# Patient Record
Sex: FEMALE | Race: WHITE | NOT HISPANIC OR LATINO | Employment: OTHER | URBAN - NONMETROPOLITAN AREA
[De-identification: names, ages, dates, MRNs, and addresses within clinical notes are randomized per-mention and may not be internally consistent; named-entity substitution may affect disease eponyms.]

---

## 2017-10-17 ENCOUNTER — OFFICE VISIT (OUTPATIENT)
Dept: OBSTETRICS AND GYNECOLOGY | Facility: CLINIC | Age: 42
End: 2017-10-17

## 2017-10-17 VITALS
DIASTOLIC BLOOD PRESSURE: 88 MMHG | BODY MASS INDEX: 25.48 KG/M2 | SYSTOLIC BLOOD PRESSURE: 156 MMHG | HEIGHT: 71 IN | WEIGHT: 182 LBS

## 2017-10-17 DIAGNOSIS — F41.9 ANXIETY: ICD-10-CM

## 2017-10-17 DIAGNOSIS — N64.4 BREAST TENDERNESS: ICD-10-CM

## 2017-10-17 DIAGNOSIS — Z01.419 WELL WOMAN EXAM WITH ROUTINE GYNECOLOGICAL EXAM: Primary | ICD-10-CM

## 2017-10-17 DIAGNOSIS — N39.0 URINARY TRACT INFECTION WITHOUT HEMATURIA, SITE UNSPECIFIED: ICD-10-CM

## 2017-10-17 PROCEDURE — 87512 GARDNER VAG DNA QUANT: CPT | Performed by: NURSE PRACTITIONER

## 2017-10-17 PROCEDURE — 87798 DETECT AGENT NOS DNA AMP: CPT | Performed by: NURSE PRACTITIONER

## 2017-10-17 PROCEDURE — 99213 OFFICE O/P EST LOW 20 MIN: CPT | Performed by: NURSE PRACTITIONER

## 2017-10-17 PROCEDURE — 87481 CANDIDA DNA AMP PROBE: CPT | Performed by: NURSE PRACTITIONER

## 2017-10-17 PROCEDURE — 99396 PREV VISIT EST AGE 40-64: CPT | Performed by: NURSE PRACTITIONER

## 2017-10-17 PROCEDURE — 87661 TRICHOMONAS VAGINALIS AMPLIF: CPT | Performed by: NURSE PRACTITIONER

## 2017-10-17 PROCEDURE — G0123 SCREEN CERV/VAG THIN LAYER: HCPCS | Performed by: NURSE PRACTITIONER

## 2017-10-17 RX ORDER — NITROFURANTOIN 25; 75 MG/1; MG/1
100 CAPSULE ORAL 2 TIMES DAILY
Qty: 20 CAPSULE | Refills: 5 | Status: SHIPPED | OUTPATIENT
Start: 2017-10-17 | End: 2018-11-30 | Stop reason: SDUPTHER

## 2017-10-17 RX ORDER — BUSPIRONE HYDROCHLORIDE 5 MG/1
5 TABLET ORAL 3 TIMES DAILY
Qty: 30 TABLET | Refills: 12 | Status: SHIPPED | OUTPATIENT
Start: 2017-10-17 | End: 2018-11-30

## 2017-10-17 NOTE — PROGRESS NOTES
"Subjective   Natalie Lawrence is a 42 y.o. female     HPI Comments: Here for yearly check up. Having breast tenderness.    Gynecologic Exam   The patient's pertinent negatives include no pelvic pain, vaginal bleeding or vaginal discharge. The patient is experiencing no pain. Pertinent negatives include no abdominal pain, anorexia, back pain, chills, constipation, diarrhea, discolored urine, dysuria, fever, flank pain, frequency, headaches, hematuria, joint pain, joint swelling, nausea, painful intercourse, rash, sore throat, urgency or vomiting. She is sexually active. She uses condoms for contraception. Her menstrual history has been regular.             /88  Ht 71\" (180.3 cm)  Wt 182 lb (82.6 kg)  LMP 10/01/2017 (Exact Date)  BMI 25.38 kg/m2      Outpatient Encounter Prescriptions as of 10/17/2017   Medication Sig Dispense Refill   • cholecalciferol (VITAMIN D3) 1000 UNITS tablet Take 1,000 Units by mouth Daily.     • Multiple Vitamins-Minerals (DAILY VITAMIN FORMULA+MINERALS PO) Take  by mouth.     • Bromfenac Sodium (PROLENSA) 0.07 % solution Apply  to eye.     • busPIRone (BUSPAR) 5 MG tablet Take 1 tablet by mouth 3 (Three) Times a Day. 30 tablet 12   • nitrofurantoin, macrocrystal-monohydrate, (MACROBID) 100 MG capsule Take 1 capsule by mouth 2 (Two) Times a Day. 20 capsule 5   • [DISCONTINUED] busPIRone (BUSPAR) 5 MG tablet Take 1 tablet by mouth 3 (Three) Times a Day. 30 tablet 12   • [DISCONTINUED] diclofenac (VOLTAREN) 50 MG EC tablet Take 1 tablet by mouth 2 (Two) Times a Day. 60 tablet 1   • [DISCONTINUED] fluconazole (DIFLUCAN) 150 MG tablet Take 1 tablet by mouth Daily. Take one today and repeat in 1 week 2 tablet 0   • [DISCONTINUED] nitrofurantoin, macrocrystal-monohydrate, (MACROBID) 100 MG capsule Take 1 capsule by mouth 2 (Two) Times a Day. 20 capsule 5     No facility-administered encounter medications on file as of 10/17/2017.            Surgical History  Past Surgical History: "   Procedure Laterality Date   •  SECTION     • LYMPH NODE BIOPSY           Family History  Family History   Problem Relation Age of Onset   • Heart disease Father    • Diabetes Father    • Hypertension Father    • Prostate cancer Father 60   • No Known Problems Mother    • Stroke Paternal Grandfather    • Breast cancer Neg Hx    • Ovarian cancer Neg Hx    • Uterine cancer Neg Hx    • Colon cancer Neg Hx    • Melanoma Neg Hx              The following portions of the patient's history were reviewed and updated as appropriate: allergies, current medications, past family history, past medical history, past social history, past surgical history and problem list.    Review of Systems   Constitutional: Negative for activity change, appetite change, chills, diaphoresis, fatigue, fever and unexpected weight change.   HENT: Negative for congestion, ear discharge, ear pain, facial swelling, hearing loss, mouth sores, nosebleeds, postnasal drip, rhinorrhea, sinus pressure, sneezing, sore throat, tinnitus, trouble swallowing and voice change.    Eyes: Negative for photophobia, pain, discharge, redness, itching and visual disturbance.   Respiratory: Negative for apnea, cough, choking, chest tightness and shortness of breath.    Cardiovascular: Negative for chest pain, palpitations and leg swelling.   Gastrointestinal: Negative for abdominal distention, abdominal pain, anal bleeding, anorexia, blood in stool, constipation, diarrhea, nausea, rectal pain and vomiting.   Endocrine: Negative for cold intolerance and heat intolerance.   Genitourinary: Negative for decreased urine volume, difficulty urinating, dyspareunia, dysuria, flank pain, frequency, genital sores, hematuria, menstrual problem, pelvic pain, urgency, vaginal bleeding, vaginal discharge and vaginal pain.   Musculoskeletal: Negative for arthralgias, back pain, joint pain, joint swelling and myalgias.   Skin: Negative for color change and rash.    Allergic/Immunologic: Negative for environmental allergies.   Neurological: Negative for dizziness, syncope, weakness, numbness and headaches.   Hematological: Negative for adenopathy.   Psychiatric/Behavioral: Negative for agitation, confusion and sleep disturbance. The patient is not nervous/anxious.              Objective   Physical Exam   Constitutional: She is oriented to person, place, and time. She appears well-developed and well-nourished. No distress.   HENT:   Head: Normocephalic.   Right Ear: External ear normal.   Left Ear: External ear normal.   Nose: Nose normal.   Mouth/Throat: Oropharynx is clear and moist.   Eyes: Conjunctivae are normal. Right eye exhibits no discharge. Left eye exhibits no discharge. No scleral icterus.   Neck: Normal range of motion. Neck supple. Carotid bruit is not present. No tracheal deviation present. No thyromegaly present.   Cardiovascular: Normal rate, regular rhythm, normal heart sounds and intact distal pulses.    No murmur heard.  Pulmonary/Chest: Effort normal and breath sounds normal. No respiratory distress. She has no wheezes. Right breast exhibits no inverted nipple, no mass, no nipple discharge, no skin change and no tenderness. Left breast exhibits no inverted nipple, no mass, no nipple discharge, no skin change and no tenderness. Breasts are symmetrical. There is no breast swelling.   Abdominal: Soft. She exhibits no distension and no mass. There is no tenderness. There is no guarding. No hernia. Hernia confirmed negative in the right inguinal area and confirmed negative in the left inguinal area.   Genitourinary: Rectum normal, vagina normal and uterus normal. Rectal exam shows no mass. No breast tenderness, discharge or bleeding. Pelvic exam was performed with patient supine. There is no rash, tenderness, lesion or injury on the right labia. There is no rash, tenderness, lesion or injury on the left labia. Uterus is not enlarged, not fixed and not tender.  Cervix exhibits no motion tenderness, no discharge and no friability. Right adnexum displays no mass, no tenderness and no fullness. Left adnexum displays no mass, no tenderness and no fullness. No erythema, tenderness or bleeding in the vagina. No foreign body in the vagina. No signs of injury around the vagina. No vaginal discharge found.   Genitourinary Comments:   BSU normal  Urethral meatus  Normal  Perineum  Normal   Musculoskeletal: Normal range of motion. She exhibits no edema or tenderness.   Lymphadenopathy:        Head (right side): No submental, no submandibular, no tonsillar, no preauricular, no posterior auricular and no occipital adenopathy present.        Head (left side): No submental, no submandibular, no tonsillar, no preauricular, no posterior auricular and no occipital adenopathy present.     She has no cervical adenopathy.        Right cervical: No superficial cervical, no deep cervical and no posterior cervical adenopathy present.       Left cervical: No superficial cervical, no deep cervical and no posterior cervical adenopathy present.     She has no axillary adenopathy.        Right: No inguinal adenopathy present.        Left: No inguinal adenopathy present.   Neurological: She is alert and oriented to person, place, and time. Coordination normal.   Skin: Skin is warm and dry. No bruising and no rash noted. She is not diaphoretic. No erythema.   Psychiatric: She has a normal mood and affect. Her behavior is normal. Judgment and thought content normal.   Nursing note and vitals reviewed.        Assessment/Plan   Natalie was seen today for gynecologic exam.    Diagnoses and all orders for this visit:    Well woman exam with routine gynecological exam  Normal GYN exam. Will RTO for lab work. Encouraged SBE. Discussed weight management and importance of maintaining a healthy weight. Discussed Vitamin D intake and the importance of adequate vitamin D. Mammogram will be done at [facility].  -      Liquid-based Pap Smear, Screening - ThinPrep Vial, Cervix  -     CBC & Differential  -     Comprehensive Metabolic Panel  -     Lipid Panel With LDL / HDL Ratio  -     TSH  -     T3, Uptake  -     Vitamin D 25 Hydroxy  -     T4, Free  -     Hemoglobin A1c  -     UA / M With / Rflx Culture([facility] ONLY) - Urine, Clean Catch  -     Mammo Screening Digital Tomosynthesis Bilateral With CAD; Future    Breast tenderness  Comments:  Having some breast tenderness. Encouraged to decrease Caffeine intake.    Anxiety  Comments:  RF given for Buspar. Doing well with it.  Orders:  -     busPIRone (BUSPAR) 5 MG tablet; Take 1 tablet by mouth 3 (Three) Times a Day.    Urinary tract infection without hematuria, site unspecified  Comments:  Has frequent post coital UTI. Given Macrobid to use as needed.  Orders:  -     nitrofurantoin, macrocrystal-monohydrate, (MACROBID) 100 MG capsule; Take 1 capsule by mouth 2 (Two) Times a Day.

## 2017-10-19 LAB
25(OH)D3+25(OH)D2 SERPL-MCNC: 58.9 NG/ML (ref 30–100)
ALBUMIN SERPL-MCNC: 4.4 G/DL (ref 3.5–5)
ALBUMIN/GLOB SERPL: 1.4 G/DL (ref 1.1–2.5)
ALP SERPL-CCNC: 68 U/L (ref 24–120)
ALT SERPL-CCNC: 35 U/L (ref 0–54)
APPEARANCE UR: CLEAR
AST SERPL-CCNC: 22 U/L (ref 7–45)
BACTERIA #/AREA URNS HPF: ABNORMAL /HPF
BACTERIA UR CULT: NO GROWTH
BACTERIA UR CULT: NORMAL
BASOPHILS # BLD AUTO: 0.03 10*3/MM3 (ref 0–0.2)
BASOPHILS NFR BLD AUTO: 0.4 % (ref 0–2)
BILIRUB SERPL-MCNC: 0.4 MG/DL (ref 0.1–1)
BILIRUB UR QL STRIP: NEGATIVE
BUN SERPL-MCNC: 12 MG/DL (ref 5–21)
BUN/CREAT SERPL: 18.2 (ref 7–25)
CALCIUM SERPL-MCNC: 9.1 MG/DL (ref 8.4–10.4)
CHLORIDE SERPL-SCNC: 103 MMOL/L (ref 98–110)
CHOLEST SERPL-MCNC: 145 MG/DL (ref 130–200)
CO2 SERPL-SCNC: 27 MMOL/L (ref 24–31)
COLOR UR: YELLOW
CREAT SERPL-MCNC: 0.66 MG/DL (ref 0.5–1.4)
EOSINOPHIL # BLD AUTO: 0.09 10*3/MM3 (ref 0–0.7)
EOSINOPHIL NFR BLD AUTO: 1.3 % (ref 0–4)
EPI CELLS #/AREA URNS HPF: ABNORMAL /HPF
ERYTHROCYTE [DISTWIDTH] IN BLOOD BY AUTOMATED COUNT: 12.6 % (ref 12–15)
GLOBULIN SER CALC-MCNC: 3.1 GM/DL
GLUCOSE SERPL-MCNC: 98 MG/DL (ref 70–100)
GLUCOSE UR QL: NEGATIVE
HBA1C MFR BLD: 5 %
HCT VFR BLD AUTO: 41.1 % (ref 37–47)
HDLC SERPL-MCNC: 50 MG/DL
HGB BLD-MCNC: 13.7 G/DL (ref 12–16)
HGB UR QL STRIP: NEGATIVE
IMM GRANULOCYTES # BLD: 0.02 10*3/MM3 (ref 0–0.03)
IMM GRANULOCYTES NFR BLD: 0.3 % (ref 0–5)
KETONES UR QL STRIP: NEGATIVE
LDLC SERPL CALC-MCNC: 85 MG/DL (ref 0–99)
LDLC/HDLC SERPL: 1.69 {RATIO}
LEUKOCYTE ESTERASE UR QL STRIP: NEGATIVE
LYMPHOCYTES # BLD AUTO: 1.68 10*3/MM3 (ref 0.72–4.86)
LYMPHOCYTES NFR BLD AUTO: 23.6 % (ref 15–45)
MCH RBC QN AUTO: 29.3 PG (ref 28–32)
MCHC RBC AUTO-ENTMCNC: 33.3 G/DL (ref 33–36)
MCV RBC AUTO: 87.8 FL (ref 82–98)
MICRO URNS: NORMAL
MICRO URNS: NORMAL
MONOCYTES # BLD AUTO: 0.62 10*3/MM3 (ref 0.19–1.3)
MONOCYTES NFR BLD AUTO: 8.7 % (ref 4–12)
MUCOUS THREADS URNS QL MICRO: PRESENT /HPF
NEUTROPHILS # BLD AUTO: 4.68 10*3/MM3 (ref 1.87–8.4)
NEUTROPHILS NFR BLD AUTO: 65.7 % (ref 39–78)
NITRITE UR QL STRIP: NEGATIVE
PH UR STRIP: 6.5 [PH] (ref 5–7.5)
PLATELET # BLD AUTO: 298 10*3/MM3 (ref 130–400)
POTASSIUM SERPL-SCNC: 4 MMOL/L (ref 3.5–5.3)
PROT SERPL-MCNC: 7.5 G/DL (ref 6.3–8.7)
PROT UR QL STRIP: NEGATIVE
RBC # BLD AUTO: 4.68 10*6/MM3 (ref 4.2–5.4)
RBC #/AREA URNS HPF: ABNORMAL /HPF
SODIUM SERPL-SCNC: 141 MMOL/L (ref 135–145)
SP GR UR: 1.01 (ref 1–1.03)
T3RU NFR SERPL: 27 % (ref 24–39)
T4 FREE SERPL-MCNC: 1.09 NG/DL (ref 0.78–2.19)
TRIGL SERPL-MCNC: 52 MG/DL (ref 0–149)
TSH SERPL DL<=0.005 MIU/L-ACNC: 0.84 MIU/ML (ref 0.47–4.68)
URINALYSIS REFLEX: NORMAL
UROBILINOGEN UR STRIP-MCNC: 0.2 MG/DL (ref 0.2–1)
VLDLC SERPL CALC-MCNC: 10.4 MG/DL
WBC # BLD AUTO: 7.12 10*3/MM3 (ref 4.8–10.8)
WBC #/AREA URNS HPF: ABNORMAL /HPF

## 2017-10-20 ENCOUNTER — DOCUMENTATION (OUTPATIENT)
Dept: OBSTETRICS AND GYNECOLOGY | Facility: CLINIC | Age: 42
End: 2017-10-20

## 2017-10-20 LAB
GEN CATEG CVX/VAG CYTO-IMP: NORMAL
LAB AP CASE REPORT: NORMAL
LAB AP GYN ADDITIONAL INFORMATION: NORMAL
Lab: NORMAL
PATH INTERP SPEC-IMP: NORMAL
STAT OF ADQ CVX/VAG CYTO-IMP: NORMAL

## 2017-10-27 ENCOUNTER — APPOINTMENT (OUTPATIENT)
Dept: MAMMOGRAPHY | Facility: HOSPITAL | Age: 42
End: 2017-10-27

## 2017-11-03 ENCOUNTER — HOSPITAL ENCOUNTER (OUTPATIENT)
Dept: MAMMOGRAPHY | Facility: HOSPITAL | Age: 42
Discharge: HOME | End: 2017-11-03
Admitting: NURSE PRACTITIONER

## 2017-11-03 DIAGNOSIS — Z01.419 WELL WOMAN EXAM WITH ROUTINE GYNECOLOGICAL EXAM: ICD-10-CM

## 2017-11-03 PROCEDURE — G0202 SCR MAMMO BI INCL CAD: HCPCS

## 2017-11-03 PROCEDURE — 77063 BREAST TOMOSYNTHESIS BI: CPT

## 2017-11-10 ENCOUNTER — OFFICE VISIT (OUTPATIENT)
Dept: FAMILY MEDICINE CLINIC | Facility: CLINIC | Age: 42
End: 2017-11-10

## 2017-11-10 VITALS
OXYGEN SATURATION: 98 % | RESPIRATION RATE: 16 BRPM | TEMPERATURE: 98.3 F | HEIGHT: 71 IN | BODY MASS INDEX: 25.7 KG/M2 | SYSTOLIC BLOOD PRESSURE: 132 MMHG | WEIGHT: 183.6 LBS | HEART RATE: 89 BPM | DIASTOLIC BLOOD PRESSURE: 74 MMHG

## 2017-11-10 DIAGNOSIS — Z23 NEED FOR INFLUENZA VACCINATION: ICD-10-CM

## 2017-11-10 DIAGNOSIS — B35.1 ONYCHOMYCOSIS: Primary | ICD-10-CM

## 2017-11-10 PROCEDURE — 99203 OFFICE O/P NEW LOW 30 MIN: CPT | Performed by: NURSE PRACTITIONER

## 2017-11-10 PROCEDURE — 90686 IIV4 VACC NO PRSV 0.5 ML IM: CPT | Performed by: NURSE PRACTITIONER

## 2017-11-10 PROCEDURE — 90471 IMMUNIZATION ADMIN: CPT | Performed by: NURSE PRACTITIONER

## 2017-11-10 NOTE — PROGRESS NOTES
Chief Complaint   Patient presents with   • Toe Pain     right         No Known Allergies    HPI:  Natalie Lawrence is a 42 y.o. female presents today with complaints of fungus on her right 3rd digit toe.  Has been present for a couple months and now it is hurting her and the nail is thick, discolored and painful.      Past Medical History:   Diagnosis Date   • Macular degeneration      Past Surgical History:   Procedure Laterality Date   •  SECTION     • LYMPH NODE BIOPSY       Social History     Social History   • Marital status:      Spouse name: N/A   • Number of children: N/A   • Years of education: N/A     Social History Main Topics   • Smoking status: Never Smoker   • Smokeless tobacco: Never Used   • Alcohol use No   • Drug use: No   • Sexual activity: Yes     Partners: Male     Birth control/ protection: Condom     Other Topics Concern   • None     Social History Narrative     Family History   Problem Relation Age of Onset   • Heart disease Father    • Diabetes Father    • Hypertension Father    • Prostate cancer Father 60   • No Known Problems Mother    • Stroke Paternal Grandfather    • Breast cancer Neg Hx    • Ovarian cancer Neg Hx    • Uterine cancer Neg Hx    • Colon cancer Neg Hx    • Melanoma Neg Hx        Current Outpatient Prescriptions on File Prior to Visit   Medication Sig Dispense Refill   • busPIRone (BUSPAR) 5 MG tablet Take 1 tablet by mouth 3 (Three) Times a Day. (Patient taking differently: Take 5 mg by mouth 3 (Three) Times a Day. As needed.) 30 tablet 12   • cholecalciferol (VITAMIN D3) 1000 UNITS tablet Take 1,000 Units by mouth Daily.     • Multiple Vitamins-Minerals (DAILY VITAMIN FORMULA+MINERALS PO) Take  by mouth.     • nitrofurantoin, macrocrystal-monohydrate, (MACROBID) 100 MG capsule Take 1 capsule by mouth 2 (Two) Times a Day. 20 capsule 5   • [DISCONTINUED] Bromfenac Sodium (PROLENSA) 0.07 % solution Apply  to eye.       No current facility-administered  "medications on file prior to visit.         REVIEW OF SYMPTOMS: (Positives bolded)  General:  weight loss, fever, chills, night sweats, fatigue, appetite loss  HEENT:  blurry vision, eye pain, eye discharge, dry eyes, decreased vision, sore throat tinnitus, bloody nose, hearin gloss, sinus pain/pressure, ear pain/pressure.   Respiratory: shortness of breath, cough, hemoptysis, wheezing, pleurisy,   Cardiovascular:  chest pain, PND, palpitation, edema, orthopnea, syncope, swelling of extremities  Gastro: Nausea, vomiting, diarrhea, hematemesis, abdominal pain, constipation  Genito: hematuria, dysuria, glycosuria, hesitancy, frequency, incontinence  Musckelo: Arthralgia, myalgia, muscle weakness, joint swelling, NSAID use  Skin: rash, pruritis, sores, nail changes, skin thickening, change in wart/mole, itching,  nail changes  Neuro:  Migraine, numbness, ataxia, tremor, vertigo, weakness, memory loss,  \"All other systems reviewed and negative, except as listed above.”      OBJECTIVE:  Constitutional:  Appearance-No acute distress, Consistent with stated age. Orientation- Oriented x 3, alert Posture-Not doubled over. Gait-Normal pace, normal arm movement. Posture- Normal Build and Nutrition-Well developed and well nourished.  General- Patient is pleasant and cooperative with the interview and exam.    Integumentary: General-No rashes, ulcers or lesions. No edema.  Palpation- Normal skin moisture/turgor. Skin is warm to touch, no increased warmth. Capillary refill is normal bilateral Upper and lower extremity.     Head/Neck: Head- normocephalic and atraumatic.  Neck- without visible/palpable lumps or pulsations.  Palpation- No bony tenderness about head/neck along frontal, occiptial, temporal, parietal, mastoid, jawline, zygoma, orbit or any other location.  NO temporal artery tenderness. No TMJ tenderness. Normal cervical ROM.   Neck Supple.  Thyroid-No thyromegaly, no nodules    CHEST/LUNG: Inspection- symmetric chest " wall no pectus deformity. Normal effort, no distress, no use of accessory muscles. Palpation- nontender sternum, ribline.  No abnormal pulsations. Auscultation- Breath sounds normal throughout all lung fields.  Normal tracheal sounds, Normal bronchial sounds overlying sternum, Bronchovessicular sounds normal between scapulae posteriorly, Normal vessicular breath sounds heard throughout periphery. Lungs are clear today. Adventitious sounds- No wheezes, rales, rhonchi.     CARDIOVASCULAR:  Carotid artery- normal, no bruits or abnormal pulsations. Jugular vein- no pulsations. Palpation/Percussion- Normal PMI, no palpable thrill  Auscultation- Regular rate and rhythm. No murmur noted in sitting, supine positions. Extremities- no digital clubbing, cyanosis, edema, increased warmth.    Peripheral Vascular: Upper extremity Left- Normal temperature with pink nailbeds and no ulcerations.  Upper extremity Right- Normal temperature with pink nailbeds and no ulcerations.  Lower extremity- Normal temperature with pink nailbeds and no ulcerations. DP pulses 2+ bilaterally.  Pedal hair intact.  Normal capillary refill. Edema- No edema.    Right FOOT:  The 3rd right digit toe has a very thick, discolored nail that is painful around the nail borders.  She has good pedal pulse and good ROM and sensation.      Lymphatic: Head/Neck- normal size and non tender to palpation. Axillary- Head and neck LN are normal size and non tender to palpation. Femoral and Inguinal- normal size and non tender to palpation.      Assessment/Plan:  Natalie was seen today for toe pain.    Diagnoses and all orders for this visit:    Onychomycosis  -     Ambulatory Referral to Podiatry    Need for influenza vaccination  -     Flu Vaccine Quad PF 3YR+ (FLUARIX/FLUZONE [id number])    Return in about 1 month (around 12/10/2017), or if symptoms worsen or fail to improve.    Morelia Field DNP, APRN-BC  11/10/2017          Morelia Filed DNP,  APRN-BC  11/10/2017

## 2017-11-10 NOTE — PATIENT INSTRUCTIONS
Fungal Nail Infection  Fungal nail infection is a common fungal infection of the toenails or fingernails. This condition affects toenails more often than fingernails. More than one nail may be infected. The condition can be passed from person to person (is contagious).  CAUSES  This condition is caused by a fungus. Several types of funguses can cause the infection. These funguses are common in moist and warm areas. If your hands or feet come into contact with the fungus, it may get into a crack in your fingernail or toenail and cause the infection.  RISK FACTORS  The following factors may make you more likely to develop this condition:  · Being male.  · Having diabetes.  · Being of older age.  · Living with someone who has the fungus.  · Walking barefoot in areas where the fungus thrives, such as showers or locker rooms.  · Having poor circulation.  · Wearing shoes and socks that cause your feet to sweat.  · Having athlete's foot.  · Having a nail injury or history of a recent nail surgery.  · Having psoriasis.  · Having a weak body defense system (immune system).  SYMPTOMS  Symptoms of this condition include:  · A pale spot on the nail.  · Thickening of the nail.  · A nail that becomes yellow or brown.  · A brittle or ragged nail edge.  · A crumbling nail.  · A nail that has lifted away from the nail bed.  DIAGNOSIS  This condition is diagnosed with a physical exam. Your health care provider may take a scraping or clipping from your nail to test for the fungus.  TREATMENT  Mild infections do not need treatment. If you have significant nail changes, treatment may include:  · Oral antifungal medicines. You may need to take the medicine for several weeks or several months, and you may not see the results for a long time. These medicines can cause side effects. Ask your health care provider what problems to watch for.  · Antifungal nail polish and nail cream. These may be used along with oral antifungal  medicines.  · Laser treatment of the nail.  · Surgery to remove the nail. This may be needed for the most severe infections.  Treatment takes a long time, and the infection may come back.  HOME CARE INSTRUCTIONS  Medicines  · Take or apply over-the-counter and prescription medicines only as told by your health care provider.  · Ask your health care provider about using over-the-counter mentholated ointment on your nails.  Lifestyle  · Do not share personal items, such as towels or nail clippers.  · Trim your nails often.  · Wash and dry your hands and feet every day.  · Wear absorbent socks, and change your socks frequently.  · Wear shoes that allow air to circulate, such as sandals or canvas tennis shoes. Throw out old shoes.  · Wear rubber gloves if you are working with your hands in wet areas.  · Do not walk barefoot in shower rooms or locker rooms.  · Do not use a nail salon that does not use clean instruments.  · Do not use artificial nails.  General Instructions  · Keep all follow-up visits as told by your health care provider. This is important.  · Use antifungal foot powder on your feet and in your shoes.  SEEK MEDICAL CARE IF:   Your infection is not getting better or it is getting worse after several months.     This information is not intended to replace advice given to you by your health care provider. Make sure you discuss any questions you have with your health care provider.     Document Released: 12/15/2001 Document Revised: 04/10/2017 Document Reviewed: 06/20/2016  [employer] Patient Education ©2017 [employer]

## 2017-11-13 ENCOUNTER — TELEPHONE (OUTPATIENT)
Dept: PODIATRY | Facility: CLINIC | Age: 42
End: 2017-11-13

## 2017-11-13 NOTE — TELEPHONE ENCOUNTER
Left message for patient advising that her appointment for Dr Burton had been scheduled for 12/6.  Requested a call back if this appointment would not work.  Information sent via mail as well

## 2017-11-15 ENCOUNTER — TELEPHONE (OUTPATIENT)
Dept: FAMILY MEDICINE CLINIC | Facility: CLINIC | Age: 42
End: 2017-11-15

## 2017-11-15 DIAGNOSIS — L60.0 INGROWN TOENAIL: Primary | ICD-10-CM

## 2017-11-15 RX ORDER — CEPHALEXIN 500 MG/1
500 CAPSULE ORAL 3 TIMES DAILY
Qty: 21 CAPSULE | Refills: 0 | Status: SHIPPED | OUTPATIENT
Start: 2017-11-15 | End: 2017-11-22

## 2017-11-15 NOTE — TELEPHONE ENCOUNTER
PATIENT WAS SEEN 11/10/17 FOR INFECTED TOENAIL SHE WAS REFERRED TO DR ESTEBAN HE CANT SEE HER UNTIL 12/06/17 THEY SUGGESTED THAT SHE CALL HER TO HAVE AN ANTIBIOTIC SENT IN FOR THE INFECTION THAT SHE HAS CURRENTLY IN THE TOE. PATIENT STATES THAT SHE IS NOT ALLERGIC TO ANY MEDICATIONS.PLEASE REVIEW AND ADVISE

## 2017-12-06 ENCOUNTER — OFFICE VISIT (OUTPATIENT)
Dept: PODIATRY | Facility: CLINIC | Age: 42
End: 2017-12-06

## 2017-12-06 VITALS
HEIGHT: 71 IN | DIASTOLIC BLOOD PRESSURE: 84 MMHG | SYSTOLIC BLOOD PRESSURE: 132 MMHG | HEART RATE: 110 BPM | WEIGHT: 184 LBS | OXYGEN SATURATION: 99 % | BODY MASS INDEX: 25.76 KG/M2

## 2017-12-06 DIAGNOSIS — L60.1 ONYCHOLYSIS: ICD-10-CM

## 2017-12-06 DIAGNOSIS — B35.1 ONYCHOMYCOSIS: Primary | ICD-10-CM

## 2017-12-06 PROCEDURE — 99203 OFFICE O/P NEW LOW 30 MIN: CPT | Performed by: PODIATRIST

## 2017-12-06 PROCEDURE — 11720 DEBRIDE NAIL 1-5: CPT | Performed by: PODIATRIST

## 2017-12-06 NOTE — PROGRESS NOTES
[facility] - PODIATRY    Today's Date: 17    Patient Name: Natalie Lawrence  MRN: [id number]  CSN: [id number]  PCP: No Known Provider  Referring Provider: Morelia Field DNP*    SUBJECTIVE     Chief Complaint   Patient presents with   • Right Foot - Nail Problem     Patient states she is here for issues with nail on third toe since October. She had previous treatment of oral antibiotic, antibiotic ointment and epsom salt soaks. She denies any pain today.      HPI: Natalie Lawrence, a 42 y.o.female, comes to clinic as a(n) new patient complaining of fungal loose right 3rd toenail. Patient has h/o macular degeneration, onychomycosis. States that for many years she has had fungal toenails. Mostly the nails have never bothered her. Relates that 2 months ago her right 3rd toenail became red, swollen and occasionally would drain. She has tried oral abx, topical abx cream and epsom salt foot soaks. For the past 2-3 weeks the nail has not been problematic but she wanted to have it checked out. Denies pain today. Denies any constitutional symptoms. No other pedal complaints at this time.    Past Medical History:   Diagnosis Date   • Macular degeneration      Past Surgical History:   Procedure Laterality Date   •  SECTION     • DILATATION AND CURETTAGE     • LYMPH NODE BIOPSY       Family History   Problem Relation Age of Onset   • Heart disease Father    • Diabetes Father    • Hypertension Father    • Prostate cancer Father 60   • No Known Problems Mother    • Stroke Paternal Grandfather    • Breast cancer Neg Hx    • Ovarian cancer Neg Hx    • Uterine cancer Neg Hx    • Colon cancer Neg Hx    • Melanoma Neg Hx      Social History     Social History   • Marital status:      Spouse name: N/A   • Number of children: N/A   • Years of education: N/A     Occupational History   • Not on file.     Social History Main Topics   • Smoking status: Never Smoker   • Smokeless tobacco: Never Used   •  Alcohol use No   • Drug use: No   • Sexual activity: Yes     Partners: Male     Birth control/ protection: Condom     Other Topics Concern   • Not on file     Social History Narrative     No Known Allergies  Current Outpatient Prescriptions   Medication Sig Dispense Refill   • busPIRone (BUSPAR) 5 MG tablet Take 1 tablet by mouth 3 (Three) Times a Day. (Patient taking differently: Take 5 mg by mouth 3 (Three) Times a Day. As needed.) 30 tablet 12   • cholecalciferol (VITAMIN D3) 1000 UNITS tablet Take 5,000 Units by mouth Daily.     • Multiple Vitamins-Minerals (DAILY VITAMIN FORMULA+MINERALS PO) Take  by mouth.     • NON FORMULARY Daily. OTC eye vitamin     • ciclopirox (PENLAC) 8 % solution Apply  topically Every Night for 336 days. Apply as directed to affected toenails. 6 mL 5   • nitrofurantoin, macrocrystal-monohydrate, (MACROBID) 100 MG capsule Take 1 capsule by mouth 2 (Two) Times a Day. 20 capsule 5     No current facility-administered medications for this visit.      Review of Systems   Constitutional: Negative for chills and fever.   HENT: Negative for congestion.    Respiratory: Negative for shortness of breath.    Cardiovascular: Negative for chest pain and leg swelling.   Gastrointestinal: Negative for constipation, diarrhea, nausea and vomiting.   Skin: Negative for wound.        Fungal toenail   Neurological: Negative for numbness.       OBJECTIVE     Vitals:    12/06/17 1311   BP: 132/84   Pulse: 110   SpO2: 99%       PHYSICAL EXAM  GEN:   A&Ox3, NAD. Pt presents to clinic ambulating without assistance and wearing Casual Shoes.      NEURO:   Protective sensation intact to 10/10 sites Right foot, 10/10 site Left foot using [place] monofilament  Light touch sensation present  No Tinel's or Villeux sign.    VASC:  Skin temperature Warm to Warm proximal to distal robe  DP pulses 2/4 Right, 2/4 Left  PT pulses 2/4 Right, 2/4 Left  CFT <3 sec robe  Pedal hair growth present  no edema noted  robe  Varicosities absent robe    MUSC/SKEL:  Muscle Strength Right foot Dorsiflexors 5/5, Plantarflexors 5/5, Evertors 5/5, Invertors 5/5  Muscle Strength Left foot Dorsiflexors 5/5, Plantarflexors 5/5, Evertors 5/5, Invertors 5/5  ROM of the 1st MTP is full without pain or crepitus  ROM of the MTJ is full without pain or crepitus    ROM of the STJ is full without pain or crepitus    ROM of the ankle joint is full without pain or crepitus    No POP during exam  Rectus foot type   Gait pattern: Normal  No gross pedal musculoskeletal deformities noted.     DERM:  Right 3rd toenail is yellowed, thickened with subungual debris. Nail plate is only attached at proximal aspect. No open wound or sore post debridement.  Webspaces are Clean, Dry, and Intact  Skin is normal in turgor and texture with no open wounds or sores appreciated.      RADIOLOGY/NUCLEAR:  No results found.    LABORATORY/CULTURE RESULTS:      PATHOLOGY RESULTS:       ASSESSMENT/PLAN     Natalie was seen today for nail problem.    Diagnoses and all orders for this visit:    Onychomycosis    Onycholysis    Other orders  -     ciclopirox (PENLAC) 8 % solution; Apply  topically Every Night for 336 days. Apply as directed to affected toenails.    Comprehensive lower extremity examination and evaluation was performed.  Discussed findings and treatment plan including risks, benefits, and treatment options with patient in detail. Patient agreed with treatment plan.  After verbal consent obtained, nail(s) x1 debrided of length and thickness with nail nipper without incidence   Given additional options of PO antifungal or TNA, deferred at this time. Opts for topical antifungal.  An After Visit Summary was printed and given to the patient at discharge, including (if requested) any available informative/educational handouts regarding diagnosis, treatment, or medications. All questions were answered to patient/family satisfaction. Should symptoms fail to improve or  worsen they agree to call or return to clinic or to go to the Emergency Department. Discussed the importance of following up with any needed screening tests/labs/specialist appointments and any requested follow-up recommended by me today. Importance of maintaining follow-up discussed and patient accepts that missed appointments can delay diagnosis and potentially lead to worsening of conditions.  Return if symptoms worsen or fail to improve., or sooner if acute issues arise.        This document has been electronically signed by Palomo Burton DPM on December 6, 2017 1:28 PM

## 2017-12-06 NOTE — PATIENT INSTRUCTIONS
Fungal Nail Infection  Fungal nail infection is a common fungal infection of the toenails or fingernails. This condition affects toenails more often than fingernails. More than one nail may be infected. The condition can be passed from person to person (is contagious).  CAUSES  This condition is caused by a fungus. Several types of funguses can cause the infection. These funguses are common in moist and warm areas. If your hands or feet come into contact with the fungus, it may get into a crack in your fingernail or toenail and cause the infection.  RISK FACTORS  The following factors may make you more likely to develop this condition:  · Being male.  · Having diabetes.  · Being of older age.  · Living with someone who has the fungus.  · Walking barefoot in areas where the fungus thrives, such as showers or locker rooms.  · Having poor circulation.  · Wearing shoes and socks that cause your feet to sweat.  · Having athlete's foot.  · Having a nail injury or history of a recent nail surgery.  · Having psoriasis.  · Having a weak body defense system (immune system).  SYMPTOMS  Symptoms of this condition include:  · A pale spot on the nail.  · Thickening of the nail.  · A nail that becomes yellow or brown.  · A brittle or ragged nail edge.  · A crumbling nail.  · A nail that has lifted away from the nail bed.  DIAGNOSIS  This condition is diagnosed with a physical exam. Your health care provider may take a scraping or clipping from your nail to test for the fungus.  TREATMENT  Mild infections do not need treatment. If you have significant nail changes, treatment may include:  · Oral antifungal medicines. You may need to take the medicine for several weeks or several months, and you may not see the results for a long time. These medicines can cause side effects. Ask your health care provider what problems to watch for.  · Antifungal nail polish and nail cream. These may be used along with oral antifungal  medicines.  · Laser treatment of the nail.  · Surgery to remove the nail. This may be needed for the most severe infections.  Treatment takes a long time, and the infection may come back.  HOME CARE INSTRUCTIONS  Medicines  · Take or apply over-the-counter and prescription medicines only as told by your health care provider.  · Ask your health care provider about using over-the-counter mentholated ointment on your nails.  Lifestyle  · Do not share personal items, such as towels or nail clippers.  · Trim your nails often.  · Wash and dry your hands and feet every day.  · Wear absorbent socks, and change your socks frequently.  · Wear shoes that allow air to circulate, such as sandals or canvas tennis shoes. Throw out old shoes.  · Wear rubber gloves if you are working with your hands in wet areas.  · Do not walk barefoot in shower rooms or locker rooms.  · Do not use a nail salon that does not use clean instruments.  · Do not use artificial nails.  General Instructions  · Keep all follow-up visits as told by your health care provider. This is important.  · Use antifungal foot powder on your feet and in your shoes.  SEEK MEDICAL CARE IF:   Your infection is not getting better or it is getting worse after several months.     This information is not intended to replace advice given to you by your health care provider. Make sure you discuss any questions you have with your health care provider.     Document Released: 12/15/2001 Document Revised: 04/10/2017 Document Reviewed: 06/20/2016  [employer] Patient Education ©2017 [employer]  Ciclopirox nail solution  What is this medicine?  CICLOPIROX (sye kloe PEER ox) NAIL SOLUTION is an antifungal medicine. It used to treat fungal infections of the nails.  This medicine may be used for other purposes; ask your health care provider or pharmacist if you have questions.  COMMON BRAND NAME(S): SETH, Janes  What should I tell my health care provider before I take this  medicine?  They need to know if you have any of these conditions:  -diabetes mellitus  -history of seizures  -HIV infection  -immune system problems or organ transplant  -large areas of burned or damaged skin  -peripheral vascular disease or poor circulation  -taking corticosteroid medication (including steroid inhalers, cream, or lotion)  -an unusual or allergic reaction to ciclopirox, isopropyl alcohol, other medicines, foods, dyes, or preservatives  -pregnant or trying to get pregnant  -breast-feeding  How should I use this medicine?  This medicine is for external use only. Follow the directions that come with this medicine exactly. Wash and dry your hands before use. Avoid contact with the eyes, mouth or nose. If you do get this medicine in your eyes, rinse out with plenty of cool tap water. Contact your doctor or health care professional if eye irritation occurs. Use at regular intervals. Do not use your medicine more often than directed. Finish the full course prescribed by your doctor or health care professional even if you think you are better. Do not stop using except on your doctor's advice.  Talk to your pediatrician regarding the use of this medicine in children. While this medicine may be prescribed for children as young as 12 years for selected conditions, precautions do apply.  Overdosage: If you think you have taken too much of this medicine contact a poison control center or emergency room at once.  NOTE: This medicine is only for you. Do not share this medicine with others.  What if I miss a dose?  If you miss a dose, use it as soon as you can. If it is almost time for your next dose, use only that dose. Do not use double or extra doses.  What may interact with this medicine?  Interactions are not expected. Do not use any other skin products without telling your doctor or health care professional.  This list may not describe all possible interactions. Give your health care provider a list of all  the medicines, herbs, non-prescription drugs, or dietary supplements you use. Also tell them if you smoke, drink alcohol, or use illegal drugs. Some items may interact with your medicine.  What should I watch for while using this medicine?  Tell your doctor or health care professional if your symptoms get worse. Four to six months of treatment may be needed for the nail(s) to improve. Some people may not achieve a complete cure or clearing of the nails by this time.  Tell your doctor or health care professional if you develop sores or blisters that do not heal properly. If your nail infection returns after stopping using this product, contact your doctor or health care professional.  What side effects may I notice from receiving this medicine?  Side effects that you should report to your doctor or health care professional as soon as possible:  -allergic reactions like skin rash, itching or hives, swelling of the face, lips, or tongue  -severe irritation, redness, burning, blistering, peeling, swelling, oozing  Side effects that usually do not require medical attention (report to your doctor or health care professional if they continue or are bothersome):  -mild reddening of the skin  -nail discoloration  -temporary burning or mild stinging at the site of application  This list may not describe all possible side effects. Call your doctor for medical advice about side effects. You may report side effects to FDA at 0-712-FDA-6956.  Where should I keep my medicine?  Keep out of the reach of children.  Store at room temperature between 15 and 30 degrees C (59 and 86 degrees F). Do not freeze. Protect from light by storing the bottle in the carton after every use. This medicine is flammable. Keep away from heat and flame. Throw away any unused medicine after the expiration date.  NOTE: This sheet is a summary. It may not cover all possible information. If you have questions about this medicine, talk to your doctor,  pharmacist, or health care provider.     © 2017, [employer]. (2009-03-23 16:49:20)

## 2017-12-15 ENCOUNTER — OFFICE VISIT (OUTPATIENT)
Dept: FAMILY MEDICINE CLINIC | Facility: CLINIC | Age: 42
End: 2017-12-15

## 2017-12-15 ENCOUNTER — HOSPITAL ENCOUNTER (OUTPATIENT)
Dept: GENERAL RADIOLOGY | Facility: HOSPITAL | Age: 42
Discharge: HOME | End: 2017-12-15
Admitting: NURSE PRACTITIONER

## 2017-12-15 VITALS
RESPIRATION RATE: 18 BRPM | HEIGHT: 71 IN | TEMPERATURE: 100.3 F | SYSTOLIC BLOOD PRESSURE: 145 MMHG | WEIGHT: 182.2 LBS | BODY MASS INDEX: 25.51 KG/M2 | OXYGEN SATURATION: 97 % | HEART RATE: 101 BPM | DIASTOLIC BLOOD PRESSURE: 93 MMHG

## 2017-12-15 DIAGNOSIS — R50.9 FEVER, UNSPECIFIED FEVER CAUSE: Primary | ICD-10-CM

## 2017-12-15 DIAGNOSIS — J40 BRONCHITIS: ICD-10-CM

## 2017-12-15 DIAGNOSIS — R06.89 ABNORMAL BREATH SOUNDS: ICD-10-CM

## 2017-12-15 LAB
B-HCG UR QL: NEGATIVE
EXPIRATION DATE: NORMAL
FLUAV AG NPH QL: NORMAL
FLUBV AG NPH QL: NORMAL
INTERNAL CONTROL: NORMAL
INTERNAL NEGATIVE CONTROL: NEGATIVE
INTERNAL POSITIVE CONTROL: POSITIVE
Lab: NORMAL
Lab: NORMAL

## 2017-12-15 PROCEDURE — 99214 OFFICE O/P EST MOD 30 MIN: CPT | Performed by: NURSE PRACTITIONER

## 2017-12-15 PROCEDURE — 81025 URINE PREGNANCY TEST: CPT | Performed by: NURSE PRACTITIONER

## 2017-12-15 PROCEDURE — 71020 HC CHEST PA AND LATERAL: CPT

## 2017-12-15 PROCEDURE — 87804 INFLUENZA ASSAY W/OPTIC: CPT | Performed by: NURSE PRACTITIONER

## 2017-12-15 PROCEDURE — 96372 THER/PROPH/DIAG INJ SC/IM: CPT | Performed by: NURSE PRACTITIONER

## 2017-12-15 RX ORDER — FLUTICASONE PROPIONATE 50 MCG
2 SPRAY, SUSPENSION (ML) NASAL DAILY
Qty: 1 BOTTLE | Refills: 0 | Status: SHIPPED | OUTPATIENT
Start: 2017-12-15 | End: 2018-11-30

## 2017-12-15 RX ORDER — DEXAMETHASONE SODIUM PHOSPHATE 10 MG/ML
10 INJECTION INTRAMUSCULAR; INTRAVENOUS ONCE
Status: COMPLETED | OUTPATIENT
Start: 2017-12-15 | End: 2017-12-15

## 2017-12-15 RX ORDER — CETIRIZINE HYDROCHLORIDE 10 MG/1
10 TABLET ORAL DAILY
Qty: 30 TABLET | Refills: 0 | Status: SHIPPED | OUTPATIENT
Start: 2017-12-15 | End: 2018-11-30

## 2017-12-15 RX ORDER — AZITHROMYCIN 250 MG/1
TABLET, FILM COATED ORAL
Qty: 6 TABLET | Refills: 0 | Status: SHIPPED | OUTPATIENT
Start: 2017-12-15 | End: 2018-11-30

## 2017-12-15 RX ADMIN — DEXAMETHASONE SODIUM PHOSPHATE 10 MG: 10 INJECTION INTRAMUSCULAR; INTRAVENOUS at 15:23

## 2017-12-15 NOTE — PROGRESS NOTES
Chief Complaint   Patient presents with   • URI     symptoms started monday with congestion and feeling very fatigue, deep loose cough. patient has been taking ashley selzter daytime , amd nyquil gel caps without any relief.       HISTORY/ HPI:  Natalie Lawrence is a 42 y.o.  female who presents  today for an acute complaint of malaise, fever, rhinorrhea, cough, and congestion, onset approximately 3 days ago; has used OTC ashley seltzer and Daytime only with minimal relief; no aggravating factors; symptoms are constant and progressive since onset; rates severity of symptoms 5 /10; known exposure to son and  with similar illness; no recent illnesses or additional concerns.    Natalie Lawrence  has a past medical history of Anxiety and Macular degeneration.    No Known Allergies    Current Outpatient Prescriptions:   •  cholecalciferol (VITAMIN D3) 1000 UNITS tablet, Take 5,000 Units by mouth Daily., Disp: , Rfl:   •  Multiple Vitamins-Minerals (DAILY VITAMIN FORMULA+MINERALS PO), Take  by mouth., Disp: , Rfl:   •  busPIRone (BUSPAR) 5 MG tablet, Take 1 tablet by mouth 3 (Three) Times a Day. (Patient taking differently: Take 5 mg by mouth 3 (Three) Times a Day. As needed.), Disp: 30 tablet, Rfl: 12  •  nitrofurantoin, macrocrystal-monohydrate, (MACROBID) 100 MG capsule, Take 1 capsule by mouth 2 (Two) Times a Day., Disp: 20 capsule, Rfl: 5  •  NON FORMULARY, Daily. OTC eye vitamin, Disp: , Rfl:   Past Medical History:   Diagnosis Date   • Anxiety    • Macular degeneration      Past Surgical History:   Procedure Laterality Date   •  SECTION     • DILATATION AND CURETTAGE     • LYMPH NODE BIOPSY       Social History     Social History   • Marital status:      Spouse name: N/A   • Number of children: N/A   • Years of education: N/A     Social History Main Topics   • Smoking status: Never Smoker   • Smokeless tobacco: Never Used   • Alcohol use No   • Drug use: No   • Sexual activity: Yes      "Partners: Male     Birth control/ protection: Condom     Other Topics Concern   • None     Social History Narrative   • None     Family History   Problem Relation Age of Onset   • Heart disease Father    • Diabetes Father    • Hypertension Father    • Prostate cancer Father 60   • No Known Problems Mother    • Stroke Paternal Grandfather    • Breast cancer Neg Hx    • Ovarian cancer Neg Hx    • Uterine cancer Neg Hx    • Colon cancer Neg Hx    • Melanoma Neg Hx      Family history, surgical history, past medical history, Allergies and med's reviewed with patient today and updated in [facility] EMR.     ROS:  Review of Systems   Constitutional: Positive for chills, fatigue and fever. Negative for diaphoresis.   HENT: Positive for congestion, rhinorrhea (purulent in the am and clear throughout the day) and sinus pressure (maxillary). Negative for ear discharge, ear pain, sinus pain, sneezing, sore throat, trouble swallowing and voice change.    Eyes: Negative for pain, discharge, redness and itching.   Respiratory: Positive for cough (non-productive, intermittent). Negative for chest tightness, shortness of breath and wheezing.    Cardiovascular: Negative for chest pain, palpitations and leg swelling.   Gastrointestinal: Negative for abdominal distention, diarrhea, nausea and vomiting.   Endocrine: Negative.    Genitourinary: Negative.    Musculoskeletal: Negative for myalgias.   Skin: Negative for rash.   Allergic/Immunologic: Positive for environmental allergies.   Neurological: Positive for headaches.   Hematological: Negative.    Psychiatric/Behavioral: Negative.    All other systems reviewed and are negative.    OBJECTIVE:  Vitals:    12/15/17 1337 12/15/17 1345   BP: 155/91 145/93   BP Location: Right arm Left arm   Patient Position: Sitting    Cuff Size: Adult    Pulse: 101    Resp: 18    Temp: 100.3 °F (37.9 °C)    TempSrc: Temporal Artery     SpO2: 97%    Weight: 82.6 kg (182 lb 3.2 oz)    Height: 180.3 cm (71\")  "     Physical Exam   Constitutional: She is oriented to person, place, and time. She appears well-developed and well-nourished. She is cooperative.  Non-toxic appearance. She does not have a sickly appearance. She does not appear ill. No distress.   HENT:   Head: Normocephalic.   Right Ear: Tympanic membrane normal. Tympanic membrane is not injected, not perforated, not erythematous, not retracted and not bulging.   Left Ear: Tympanic membrane normal. Tympanic membrane is not injected, not perforated, not erythematous, not retracted and not bulging.   Nose: Mucosal edema and rhinorrhea present. Right sinus exhibits no maxillary sinus tenderness and no frontal sinus tenderness. Left sinus exhibits no maxillary sinus tenderness and no frontal sinus tenderness.   Mouth/Throat: Uvula is midline, oropharynx is clear and moist and mucous membranes are normal. No posterior oropharyngeal edema or posterior oropharyngeal erythema. Tonsils are 0 on the right. Tonsils are 0 on the left. No tonsillar exudate.   Eyes: Conjunctivae are normal. Pupils are equal, round, and reactive to light. Right conjunctiva is not injected. Left conjunctiva is not injected.   Neck: Trachea normal and full passive range of motion without pain. Neck supple. No rigidity. No thyroid mass and no thyromegaly present.   Cardiovascular: Normal rate, regular rhythm and normal heart sounds.  Exam reveals no gallop and no friction rub.    No murmur heard.  Pulmonary/Chest: Effort normal. No respiratory distress. She has no decreased breath sounds. She has no wheezes. She has no rhonchi. She has rales in the right middle field and the right lower field. She exhibits no tenderness.   Lymphadenopathy:        Head (right side): Tonsillar adenopathy present.        Head (left side): Tonsillar adenopathy present.   Neurological: She is alert and oriented to person, place, and time.   Skin: Skin is warm, dry and intact. No rash noted.   Psychiatric: She has a  normal mood and affect. Her speech is normal and behavior is normal. Thought content normal.   Nursing note and vitals reviewed.    POCT Influenza A/B   Order: [id number]   Status:  Final result   Visible to patient:  No (Not Released) Dx:  Fever, unspecified fever cause      Newer results are available. Click to view them now.            Ref Range & Units 2d ago     Rapid Influenza A Ag  neg   Rapid Influenza B Ag  neg   Internal Control Passed Passed   Lot Number  [id number]   Expiration Date  98225   Resulting Agency  [facility]           POC Pregnancy, Urine   Order: [id number]   Status:  Final result   Visible to patient:  No (Not Released) Dx:  Abnormal breath sounds      Ref Range & Units 2d ago     HCG, Urine, QL Negative Negative   Lot Number  [id number]   Internal Positive Control  Positive   Internal Negative Control  Negative   Resulting [facility]           XR CHEST PA AND LATERAL- 12/15/2017 2:27 PM CST      HISTORY: abnormal breath sounds right mid and lower lung fields;  R06.89-Other abnormalities of breathing        COMPARISON: None.      FINDINGS:   Upright frontal and lateral radiographs of the chest were obtained.      The lungs are clear. The cardiomediastinal silhouette and pulmonary  vascularity are within normal limits. The osseous structures and  surrounding soft tissues demonstrate no acute abnormality.      IMPRESSION:  1. No radiographic evidence of acute cardiopulmonary process.          This report was finalized on 12/15/2017 14:38 by Dr. Timo Gant MD.    ASSESSMENT/ PLAN:  Natalie was seen today for uri.    Diagnoses and all orders for this visit:    Fever, unspecified fever cause  -     POCT Influenza A/B  -     azithromycin (ZITHROMAX Z-RUPA) 250 MG tablet; Take 2 tablets the first day, then 1 tablet daily for 4 days.    Abnormal breath sounds  -     POC Pregnancy, Urine  -     XR Chest PA & Lateral  -     azithromycin (ZITHROMAX Z-RUPA)  250 MG tablet; Take 2 tablets the first day, then 1 tablet daily for 4 days.    Bronchitis  -     azithromycin (ZITHROMAX Z-RUPA) 250 MG tablet; Take 2 tablets the first day, then 1 tablet daily for 4 days.  -     cetirizine (zyrTEC) 10 MG tablet; Take 1 tablet by mouth Daily.  -     fluticasone (FLONASE) 50 MCG/ACT nasal spray; 2 sprays into each nostril Daily.  -     dexamethasone (DECADRON) injection 10 mg; Inject 1 mL into the shoulder, thigh, or buttocks 1 (One) Time.    Acute bronchitis is a self-limited inflammation of the bronchi with clinical symptoms of cough, low grade fever, + sputum production.  This cannot be distinguished clinically from URI in first 4-5 days of illness.  Dx of bronchitis should be considered if cough >5 days.  DDX discussed today include viral processes such as Rhino (warmer months), corona, adeno, parainfluenza (cooler months), acute bronchitis unspecified, Allergic rhinitis with cough, post nasal drip syndrome, COPD, and less likely GERD, asthma and pneumonia (less likely based on history/examination absence of higher fever, systemic findings and peripheral pulmonary process).  Smoking status discussed if applicable. Treatment options typically directed towards symptom management. We discussed that studies do not typically support use of abx for treatment of bronchitis.  The patient voiced understanding. Cough can last up to 21 days with ¾ of patients having resolution of symptoms by day 14. Dextromethorphan has typically not helped with cough in children.  Studies have shown benefit to bronchodilators when wheezing is present.  Discussed limited benefit to any medications for bronchitis.  Dark honey can be a benefit.   If limited improvement or worsening over next week the patient/family can contact clinic to consider starting abx azithromycin vs doxycycline.  No abx today as these are of limited benefit.  Discussed pros and cons of steroid use both injectable and oral.  F/U in 1-2  weeks or PRN if not improving.  May consider CXR at that point as well. It is important to maintain hydration to prevent mucus thickening.  If cough changes or symptoms change f/u in office sooner. Consider steroid for bronchospasm.  Consider albuterol for bronchospasm and consider Atrovent as well.  The patient and I discussed today that Abx are not typically used for chest cold, bronchitis etc. We discussed that this can take up to 3 weeks to resolve.  Honey of? benefit. Humidified air of? benefit. Discussed limited benefit to dextromethorphan and codeine. Abx may lead to resistance, side effects and do not shorten the course of illness.   · Limited benefit to dextromethorphan and codeine and guaifenesin from [facility] reviews.  · Caution advised with combination medications. Watch for excess Tylenol as this is acetaminophen and is present in numerous over the counter combination medications.  Also, be aware of ingredients as these can be duplicated in combination medications if taking various types together.  If questions, check with pharmacist or call.  · Injection Dexamethasone R/B/A to med's d/w patient, SE reviewed as listed below  · Offered handout to patient on Bronchitis  · Allergy recommendations discussed.  Would change pillowcases and wash with hot/dry hot 2x weekly and change sheets minimum weekly. Consider anti-allergenic coverings for sheets/pillowcases.  Avoid tobacco, keep pets out of room of sleeping as able.  Use home circulation instead of windows down.  Nasal steroids discussed today.  · Risks/benefits of abx and steroids discussed with patient today.  · Take abx with food to decrease risks of diarrhea. Increased yogurt to decrease this risk further  · Consider probiotics.  · Decadron, dexamethasone, methylprednisolone, prednisone. Pt notified of potential pros/risks of steroid treatment including rapid improvement of condition; allergic reaction, psychologic reaction (depression, anxiety &  insomnia), skin change at injection site (color, dimpling), muscle weakness, changes in blood sugar, cataracts/glaucoma, AVN.  This list is not all inclusive and patient is aware they may refuse treatment  · OTC medications:  · Acetaminophen (Tylenol).  Follow package insert. Discussed risks/benefits of acetaminophen.  Do not take more than 2000 mg Tylenol per day. Discussed recent changes by FDA for risks of MI.  Caution advised with combination medications. Watch for excess Tylenol (acetaminophen) and is present in numerous over the counter combination medications.  Also, be aware of ingredients as these can be duplicated in combination medications if taking various types together.  If questions, check with pharmacist or call.  · NSAID'S (Ibuprofen/Aleve/Diclofenac/Mobic) follow package insert. NSAID's work by blocking natural substances that cause inflammation.   Discussed risks benefits of Ibuprofen/Aleve/Diclofenac/Mobic for pain. Reviewed recent FDA changes regarding increased risks for MI. The patient is aware of risks.  GI Prophylaxis discussed in relation to use of steroids and or NSAID'S.  Discussed NSAID'S may rarely increase risk for MI/stroke, which may be greater if heart disease is present, tobacco use or diabetic for example.  Rx may increase risks of GI bleed, platelet dysfunction that can occur at any time. May increase risks of kidney damage/disease.  Should not use before or after CABG or major surgery without direction. Side effects can include dizziness, drowsiness, HA upset stomach to name a few.  If any severe symptoms develop please call or f/u in clinic.  · Fluid hydration to be maintained.  · Good Hand Washing encouraged.   · Cover cough/sneeze with sleeve/elbow crease.   · OTC cough medications typically just as good as Rx varieties.  Cough drops, humidifier in room of sleeping and rest are recommended.  · For Female patients:  · If taking antibiotics and using birth control at the same  time it is important to use a backup method of birth control for 2-4 weeks as antibiotics can decrease efficacy of the birth control.    DECADRON, dexamethasone, methylprednisolone- Pt notified of potential pros/risks of steroid treatment including rapid improvement of condition; allergic reaction, psychologic reaction (depression, anxiety & insomnia), hyperglycemia, skin change at injection site (color, dimpling), muscle weakness.  Pt is aware they may refuse treatment.    INTRANASAL CORTICOSTEROIDS: The benefit of using an intranasal corticosteroids such as Flonase or Nasonex is to relieve seasonal allergic and non-allergic symptoms such as stuffy nose, rhinorrhea, nasal pruritis, and sneezing.  These medication can additionally relieve symptoms associated with the eyes, such as occular pruritis and lacrimation.  The side effects associated with these medications include nasal dryness and irritation, nausea, vomiting, sore throat, eye pain, or nose bleeds.  If these symptoms occur, discontinue use and call the clinic immediately or go to your nearest emergency department for concerns of a severe side effect or allergic reaction.     ORAL ANTIHISTAMINES do not prevent the release of histamine but block the H1 receptors responsible for the allergic response by competing for the H1 receptor site, thus decreasing the allergic response.  Medications be taken prior to the exposure of the allergen so the drug can occupy the receptor site. Histamine is released from mast cells during the initial inflammatory response that are primarily found on the skin, mucosal lining on nose, lungs, and GI tract.  Types of histamine receptors include H1 receptors such as Benadryl, Phenergan, Vistaril, Dramamine, Allerga, Claritin, or Zyrtec for seasonal allergies, edema-nasal congestion, nasal and ocular and pharyngeal pruritus, sneezing, rhinorrhea, lacrimation, contact dermatitis.  H2 receptor blockers such as Phenergan, Pepcid,  Zantac, Axid, or Tagamet.  The potential side effects of histamine blockers include bitter taste, epistaxis, headache, nasal irritation, sedation, and the anticholinergic effects of decreased vision, decreased urination, dry mouth, and constipation.      Orders Placed Today:   New Medications Ordered This Visit   Medications   • azithromycin (ZITHROMAX Z-RUPA) 250 MG tablet     Sig: Take 2 tablets the first day, then 1 tablet daily for 4 days.     Dispense:  6 tablet     Refill:  0   • cetirizine (zyrTEC) 10 MG tablet     Sig: Take 1 tablet by mouth Daily.     Dispense:  30 tablet     Refill:  0   • fluticasone (FLONASE) 50 MCG/ACT nasal spray     Si sprays into each nostril Daily.     Dispense:  1 bottle     Refill:  0   • dexamethasone (DECADRON) injection 10 mg      Management Options:    · Take all medications as prescribed; avoid driving while taking medications that can cause sedation; any concerns or signs of an adverse reaction to any medication please discontinue and call the clinic immediately or go to your nearest emergency department.  · I spoke with the patient about the benefits and risks associated with antibiotic therapy; the benefits include treating a bacterial infection, preventing the spread of disease, and minimizing serious complications associated with bacterial diseases; the risks include antibiotic resistance, allergic reactions, oral and/ or vaginal candida, and GI related side effects such as an upset stomach and diarrhea, as well as placing the patient at an increase risk for C-diff.  · Tylenol and/ or Motrin PRN per package instruction for any fever or additional pain.   · Increase PO fluids to thin secretions.  · Warm salt water gargles, throat lozenges, or chloraseptic spray for sore throat.   · Proper hand washing and cover mouth when sneezing and/ or coughing.   · I discussed and encouraged age appropriate health promotion/ prevention screenings and immunizations specific to this  client: Tdap; the patient declines these recommendations at this time.   · Follow up as needed for any new or worsening conditions or if symptoms do not resolve as anticipated.      Risks/benefits of current and new medications discussed with the patient and or family today.  The patient/family are aware and accept that if there any side effects they should call or return to clinic as soon as possible.  Appropriate F/U discussed for topics addressed today. All questions were answered to the satisfactory state of patient/family.  Should symptoms fail to improve or worsen they agree to call or return to clinic or to go to the ER. Education handouts were offered on any new Rx if requested.  Discussed the importance of following up with any needed screening tests/labs/specialist appointments and any requested follow-up recommended by me today.  Importance of maintaining follow-up discussed and patient accepts that missed appointments can delay diagnosis and potentially lead to worsening of conditions.    An After Visit Summary was printed and given to the patient at discharge.    Follow-up: Return in about 1 week (around 12/22/2017), or if symptoms worsen or fail to improve.    Reuben Mata, MAIA 12/15/2017 2:02 PM  This note was electronically signed.

## 2017-12-15 NOTE — PATIENT INSTRUCTIONS
Acute Bronchitis  Bronchitis is inflammation of the airways that extend from the windpipe into the lungs (bronchi). The inflammation often causes mucus to develop. This leads to a cough, which is the most common symptom of bronchitis.   In acute bronchitis, the condition usually develops suddenly and goes away over time, usually in a couple weeks. Smoking, allergies, and asthma can make bronchitis worse. Repeated episodes of bronchitis may cause further lung problems.   CAUSES  Acute bronchitis is most often caused by the same virus that causes a cold. The virus can spread from person to person (contagious) through coughing, sneezing, and touching contaminated objects.  SIGNS AND SYMPTOMS   · Cough.    · Fever.    · Coughing up mucus.    · Body aches.    · Chest congestion.    · Chills.    · Shortness of breath.    · Sore throat.    DIAGNOSIS   Acute bronchitis is usually diagnosed through a physical exam. Your health care provider will also ask you questions about your medical history. Tests, such as chest X-rays, are sometimes done to rule out other conditions.   TREATMENT   Acute bronchitis usually goes away in a couple weeks. Oftentimes, no medical treatment is necessary. Medicines are sometimes given for relief of fever or cough. Antibiotic medicines are usually not needed but may be prescribed in certain situations. In some cases, an inhaler may be recommended to help reduce shortness of breath and control the cough. A cool mist vaporizer may also be used to help thin bronchial secretions and make it easier to clear the chest.   HOME CARE INSTRUCTIONS  · Get plenty of rest.    · Drink enough fluids to keep your urine clear or pale yellow (unless you have a medical condition that requires fluid restriction). Increasing fluids may help thin your respiratory secretions (sputum) and reduce chest congestion, and it will prevent dehydration.    · Take medicines only as directed by your health care provider.  · If  you were prescribed an antibiotic medicine, finish it all even if you start to feel better.  · Avoid smoking and secondhand smoke. Exposure to cigarette smoke or irritating chemicals will make bronchitis worse. If you are a smoker, consider using nicotine gum or skin patches to help control withdrawal symptoms. Quitting smoking will help your lungs heal faster.    · Reduce the chances of another bout of acute bronchitis by washing your hands frequently, avoiding people with cold symptoms, and trying not to touch your hands to your mouth, nose, or eyes.    · Keep all follow-up visits as directed by your health care provider.    SEEK MEDICAL CARE IF:  Your symptoms do not improve after 1 week of treatment.   SEEK IMMEDIATE MEDICAL CARE IF:  · You develop an increased fever or chills.    · You have chest pain.    · You have severe shortness of breath.  · You have bloody sputum.    · You develop dehydration.  · You faint or repeatedly feel like you are going to pass out.  · You develop repeated vomiting.  · You develop a severe headache.  MAKE SURE YOU:   · Understand these instructions.  · Will watch your condition.  · Will get help right away if you are not doing well or get worse.     This information is not intended to replace advice given to you by your health care provider. Make sure you discuss any questions you have with your health care provider.     Document Released: 01/25/2006 Document Revised: 01/08/2016 Document Reviewed: 06/10/2014  [employer] Interactive Patient Education ©2017 [employer]

## 2018-11-30 ENCOUNTER — OFFICE VISIT (OUTPATIENT)
Dept: OBSTETRICS AND GYNECOLOGY | Facility: CLINIC | Age: 43
End: 2018-11-30

## 2018-11-30 VITALS
WEIGHT: 176 LBS | HEIGHT: 71 IN | DIASTOLIC BLOOD PRESSURE: 90 MMHG | SYSTOLIC BLOOD PRESSURE: 152 MMHG | BODY MASS INDEX: 24.64 KG/M2

## 2018-11-30 DIAGNOSIS — Z12.4 CERVICAL CANCER SCREENING: ICD-10-CM

## 2018-11-30 DIAGNOSIS — Z12.39 ENCOUNTER FOR SCREENING FOR MALIGNANT NEOPLASM OF BREAST: ICD-10-CM

## 2018-11-30 DIAGNOSIS — N39.0 URINARY TRACT INFECTION WITHOUT HEMATURIA, SITE UNSPECIFIED: ICD-10-CM

## 2018-11-30 DIAGNOSIS — F41.9 ANXIETY: ICD-10-CM

## 2018-11-30 DIAGNOSIS — E55.9 VITAMIN D DEFICIENCY: ICD-10-CM

## 2018-11-30 DIAGNOSIS — Z01.419 WELL WOMAN EXAM WITH ROUTINE GYNECOLOGICAL EXAM: Primary | ICD-10-CM

## 2018-11-30 DIAGNOSIS — R53.83 OTHER FATIGUE: ICD-10-CM

## 2018-11-30 LAB
25(OH)D3+25(OH)D2 SERPL-MCNC: 40.6 NG/ML (ref 30–100)
ALBUMIN SERPL-MCNC: 4.3 G/DL (ref 3.5–5)
ALBUMIN/GLOB SERPL: 1.3 G/DL (ref 1.1–2.5)
ALP SERPL-CCNC: 65 U/L (ref 24–120)
ALT SERPL-CCNC: 32 U/L (ref 0–54)
AST SERPL-CCNC: 23 U/L (ref 7–45)
BASOPHILS # BLD AUTO: 0.02 10*3/MM3 (ref 0–0.2)
BASOPHILS NFR BLD AUTO: 0.4 % (ref 0–2)
BILIRUB SERPL-MCNC: 0.4 MG/DL (ref 0.1–1)
BUN SERPL-MCNC: 12 MG/DL (ref 5–21)
BUN/CREAT SERPL: 19.7 (ref 7–25)
CALCIUM SERPL-MCNC: 8.9 MG/DL (ref 8.4–10.4)
CHLORIDE SERPL-SCNC: 98 MMOL/L (ref 98–110)
CHOLEST SERPL-MCNC: 152 MG/DL (ref 130–200)
CO2 SERPL-SCNC: 27 MMOL/L (ref 24–31)
CREAT SERPL-MCNC: 0.61 MG/DL (ref 0.5–1.4)
EOSINOPHIL # BLD AUTO: 0.02 10*3/MM3 (ref 0–0.7)
EOSINOPHIL NFR BLD AUTO: 0.4 % (ref 0–4)
ERYTHROCYTE [DISTWIDTH] IN BLOOD BY AUTOMATED COUNT: 12 % (ref 12–15)
GLOBULIN SER CALC-MCNC: 3.3 GM/DL
GLUCOSE SERPL-MCNC: 94 MG/DL (ref 70–100)
HBA1C MFR BLD: 4.9 %
HCT VFR BLD AUTO: 40.1 % (ref 37–47)
HDLC SERPL-MCNC: 53 MG/DL
HGB BLD-MCNC: 13.3 G/DL (ref 12–16)
IMM GRANULOCYTES # BLD: 0.01 10*3/MM3 (ref 0–0.03)
IMM GRANULOCYTES NFR BLD: 0.2 % (ref 0–5)
LDLC SERPL CALC-MCNC: 91 MG/DL (ref 0–99)
LDLC/HDLC SERPL: 1.72 {RATIO}
LYMPHOCYTES # BLD AUTO: 1.23 10*3/MM3 (ref 0.72–4.86)
LYMPHOCYTES NFR BLD AUTO: 24.7 % (ref 15–45)
MCH RBC QN AUTO: 29.4 PG (ref 28–32)
MCHC RBC AUTO-ENTMCNC: 33.2 G/DL (ref 33–36)
MCV RBC AUTO: 88.7 FL (ref 82–98)
MONOCYTES # BLD AUTO: 0.32 10*3/MM3 (ref 0.19–1.3)
MONOCYTES NFR BLD AUTO: 6.4 % (ref 4–12)
NEUTROPHILS # BLD AUTO: 3.38 10*3/MM3 (ref 1.87–8.4)
NEUTROPHILS NFR BLD AUTO: 67.9 % (ref 39–78)
NRBC BLD AUTO-RTO: 0 /100 WBC (ref 0–0)
PLATELET # BLD AUTO: 237 10*3/MM3 (ref 130–400)
POTASSIUM SERPL-SCNC: 3.9 MMOL/L (ref 3.5–5.3)
PROT SERPL-MCNC: 7.6 G/DL (ref 6.3–8.7)
RBC # BLD AUTO: 4.52 10*6/MM3 (ref 4.2–5.4)
SODIUM SERPL-SCNC: 138 MMOL/L (ref 135–145)
T4 FREE SERPL-MCNC: 1.25 NG/DL (ref 0.78–2.19)
TRIGL SERPL-MCNC: 38 MG/DL (ref 0–149)
TSH SERPL DL<=0.005 MIU/L-ACNC: 1.55 MIU/ML (ref 0.47–4.68)
VLDLC SERPL CALC-MCNC: 7.6 MG/DL
WBC # BLD AUTO: 4.98 10*3/MM3 (ref 4.8–10.8)

## 2018-11-30 PROCEDURE — 99213 OFFICE O/P EST LOW 20 MIN: CPT | Performed by: NURSE PRACTITIONER

## 2018-11-30 PROCEDURE — G0123 SCREEN CERV/VAG THIN LAYER: HCPCS | Performed by: NURSE PRACTITIONER

## 2018-11-30 PROCEDURE — 99396 PREV VISIT EST AGE 40-64: CPT | Performed by: NURSE PRACTITIONER

## 2018-11-30 RX ORDER — NITROFURANTOIN 25; 75 MG/1; MG/1
100 CAPSULE ORAL 2 TIMES DAILY
Qty: 20 CAPSULE | Refills: 5 | Status: SHIPPED | OUTPATIENT
Start: 2018-11-30 | End: 2020-06-26 | Stop reason: SDUPTHER

## 2018-11-30 RX ORDER — ALPRAZOLAM 0.25 MG/1
0.25 TABLET ORAL 3 TIMES DAILY PRN
Qty: 60 TABLET | Refills: 0 | Status: SHIPPED | OUTPATIENT
Start: 2018-11-30 | End: 2020-06-26 | Stop reason: SDUPTHER

## 2018-11-30 NOTE — PROGRESS NOTES
Subjective   Natalie Lawrence is a 43 y.o. female  YOB: 1975        Chief Complaint   Patient presents with   • Gynecologic Exam     Patient here for yearly exam. Last pap was done on 10/17/17 and was normal.  Last mammogram was done at [facility] on 11/03/17 and was normal. Patient needs order for mammogram, patient requesting it to be done at [facility]. Patient needing refills of Macrobid for recurrent UTI.  Patient would also like to discuss some issues she is having with anxiety.  Patient has taken Xanax 0.25mg in the past and done well with that.       Gynecologic Exam   The patient's pertinent negatives include no genital itching, genital lesions, genital odor, genital rash, missed menses or pelvic pain. Pertinent negatives include no abdominal pain, back pain, constipation, diarrhea, dysuria, fever, frequency, hematuria, nausea, rash, sore throat, urgency or vomiting.   Anxiety   Presents for initial visit. Onset was 1 to 5 years ago. The problem has been gradually worsening. Symptoms include nervous/anxious behavior. Patient reports no chest pain, depressed mood, dizziness, nausea, shortness of breath or suicidal ideas. Symptoms occur most days. The severity of symptoms is interfering with daily activities.     Treatments tried: Xanax in the past.       The following portions of the patient's history were reviewed and updated as appropriate: allergies, current medications, past family history, past medical history, past social history, past surgical history and problem list.    No Known Allergies    Past Medical History:   Diagnosis Date   • Anxiety    • Macular degeneration    • Urinary tract infection        Family History   Problem Relation Age of Onset   • Heart disease Father    • Diabetes Father    • Hypertension Father    • Prostate cancer Father 60   • No Known Problems Mother    • Stroke Paternal Grandfather    • Breast cancer Neg Hx    • Ovarian cancer Neg Hx    • Uterine cancer Neg Hx    •  Colon cancer Neg Hx    • Melanoma Neg Hx        Social History     Socioeconomic History   • Marital status:      Spouse name: Not on file   • Number of children: Not on file   • Years of education: Not on file   • Highest education level: Not on file   Social Needs   • Financial resource strain: Not on file   • Food insecurity - worry: Not on file   • Food insecurity - inability: Not on file   • Transportation needs - medical: Not on file   • Transportation needs - non-medical: Not on file   Occupational History   • Not on file   Tobacco Use   • Smoking status: Never Smoker   • Smokeless tobacco: Never Used   Substance and Sexual Activity   • Alcohol use: No   • Drug use: No   • Sexual activity: Yes     Partners: Male     Birth control/protection: Condom   Other Topics Concern   • Not on file   Social History Narrative   • Not on file         Current Outpatient Medications:   •  cholecalciferol (VITAMIN D3) 1000 UNITS tablet, Take 5,000 Units by mouth Daily., Disp: , Rfl:   •  Multiple Vitamins-Minerals (DAILY VITAMIN FORMULA+MINERALS PO), Take  by mouth., Disp: , Rfl:   •  nitrofurantoin, macrocrystal-monohydrate, (MACROBID) 100 MG capsule, Take 1 capsule by mouth 2 (Two) Times a Day., Disp: 20 capsule, Rfl: 5  •  NON FORMULARY, Daily. OTC eye vitamin, Disp: , Rfl:   •  ALPRAZolam (XANAX) 0.25 MG tablet, Take 1 tablet by mouth 3 (Three) Times a Day As Needed for Anxiety., Disp: 60 tablet, Rfl: 0    Patient's last menstrual period was 2018 (exact date).    Sexual History:         Could not be calculated    Past Surgical History:   Procedure Laterality Date   •  SECTION     • DILATATION AND CURETTAGE     • LYMPH NODE BIOPSY         Review of Systems   Constitutional: Negative for activity change, appetite change, fatigue, fever, unexpected weight gain and unexpected weight loss.   HENT: Negative for congestion, ear pain, hearing loss, nosebleeds, rhinorrhea, sore throat, tinnitus and trouble  swallowing.    Eyes: Negative for blurred vision, pain, discharge, itching and visual disturbance.   Respiratory: Negative for apnea, chest tightness, shortness of breath and wheezing.    Cardiovascular: Negative for chest pain and leg swelling.   Gastrointestinal: Negative for abdominal pain, blood in stool, constipation, diarrhea, nausea, vomiting and GERD.   Endocrine: Negative for heat intolerance, polydipsia and polyuria.   Genitourinary: Negative for urinary incontinence, decreased libido, difficulty urinating, dyspareunia, dysuria, frequency, genital sores, hematuria, menstrual problem, missed menses, pelvic pain, urgency, vaginal pain and breast lump.   Musculoskeletal: Negative for arthralgias, back pain, joint swelling and myalgias.   Skin: Negative for color change, rash and skin lesions.   Allergic/Immunologic: Negative for environmental allergies, food allergies and immunocompromised state.   Neurological: Negative for dizziness, tremors, seizures, syncope, facial asymmetry, numbness and headache.   Hematological: Negative for adenopathy. Does not bruise/bleed easily.   Psychiatric/Behavioral: Negative for agitation, hallucinations, sleep disturbance, suicidal ideas and depressed mood. The patient is nervous/anxious.        Objective   Physical Exam   Constitutional: She is oriented to person, place, and time. She appears well-developed and well-nourished. No distress.   HENT:   Head: Normocephalic.   Right Ear: External ear normal.   Left Ear: External ear normal.   Nose: Nose normal.   Mouth/Throat: Oropharynx is clear and moist.   Eyes: Conjunctivae are normal. Right eye exhibits no discharge. Left eye exhibits no discharge. No scleral icterus.   Neck: Normal range of motion. Neck supple. Carotid bruit is not present. No tracheal deviation present. No thyromegaly present.   Cardiovascular: Normal rate, regular rhythm, normal heart sounds and intact distal pulses.   No murmur heard.  Pulmonary/Chest:  Effort normal and breath sounds normal. No respiratory distress. She has no wheezes. Right breast exhibits no inverted nipple, no mass, no nipple discharge, no skin change and no tenderness. Left breast exhibits no inverted nipple, no mass, no nipple discharge, no skin change and no tenderness. Breasts are symmetrical. There is no breast swelling.   Abdominal: Soft. She exhibits no distension and no mass. There is no tenderness. There is no guarding. No hernia. Hernia confirmed negative in the right inguinal area and confirmed negative in the left inguinal area.   Genitourinary: Rectum normal, vagina normal and uterus normal. Rectal exam shows no mass. No breast tenderness, discharge or bleeding. Pelvic exam was performed with patient supine. There is no rash, tenderness, lesion or injury on the right labia. There is no rash, tenderness, lesion or injury on the left labia. Uterus is not enlarged, not fixed and not tender. Cervix exhibits no motion tenderness, no discharge and no friability. Right adnexum displays no mass, no tenderness and no fullness. Left adnexum displays no mass, no tenderness and no fullness. No erythema, tenderness or bleeding in the vagina. No foreign body in the vagina. No signs of injury around the vagina. No vaginal discharge found.   Genitourinary Comments:   BSU normal  Urethral meatus  Normal  Perineum  Normal   Musculoskeletal: Normal range of motion. She exhibits no edema or tenderness.   Lymphadenopathy:        Head (right side): No submental, no submandibular, no tonsillar, no preauricular, no posterior auricular and no occipital adenopathy present.        Head (left side): No submental, no submandibular, no tonsillar, no preauricular, no posterior auricular and no occipital adenopathy present.     She has no cervical adenopathy.        Right cervical: No superficial cervical, no deep cervical and no posterior cervical adenopathy present.       Left cervical: No superficial cervical,  "no deep cervical and no posterior cervical adenopathy present.     She has no axillary adenopathy.        Right: No inguinal adenopathy present.        Left: No inguinal adenopathy present.   Neurological: She is alert and oriented to person, place, and time. Coordination normal.   Skin: Skin is warm and dry. No bruising and no rash noted. She is not diaphoretic. No erythema.   Psychiatric: She has a normal mood and affect. Her behavior is normal. Judgment and thought content normal.   Nursing note and vitals reviewed.        Vitals:    11/30/18 1142   BP: 152/90   Weight: 79.8 kg (176 lb)   Height: 180.3 cm (71\")       Natalie was seen today for gynecologic exam.    Diagnoses and all orders for this visit:    Well woman exam with routine gynecological exam  Comments:  Normal well woman exam.  Thin prep pap smear done.  Mammogram ordered - to be done at patient's request at [facility].   Orders:  -     Liquid-based Pap Smear, Screening  -     Vitamin D 25 Hydroxy  -     T4, Free  -     Hemoglobin A1c  -     CBC & Differential  -     Comprehensive Metabolic Panel  -     Lipid Panel With LDL / HDL Ratio  -     TSH    Cervical cancer screening  Comments:  Thin prep pap smear done.   Orders:  -     Liquid-based Pap Smear, Screening    Urinary tract infection without hematuria, site unspecified  Comments:  Has frequent post coital UTI. Given Macrobid to use as needed.  Orders:  -     nitrofurantoin, macrocrystal-monohydrate, (MACROBID) 100 MG capsule; Take 1 capsule by mouth 2 (Two) Times a Day.    Encounter for screening for malignant neoplasm of breast  Comments:  Mammogram ordered.    Orders:  -     Mammo Screening Digital Tomosynthesis Bilateral With CAD; Future    Vitamin D deficiency  Comments:  To recheck today.   Orders:  -     Vitamin D 25 Hydroxy    Other fatigue  Comments:  Yearly lab panel done.   Orders:  -     Vitamin D 25 Hydroxy  -     T4, Free  -     Hemoglobin A1c  -     CBC & Differential  -     " Comprehensive Metabolic Panel  -     Lipid Panel With LDL / HDL Ratio  -     TSH    Anxiety  Comments:  Patient reports she has been on Xanax .25 mg in the past and it was very effective for managing her anxiety.  Requests a new prescription.  Natanael signed.    Orders:  -     ALPRAZolam (XANAX) 0.25 MG tablet; Take 1 tablet by mouth 3 (Three) Times a Day As Needed for Anxiety.          Patient's Body mass index is 24.55 kg/m². BMI is within normal parameters. No follow-up required.             Non-Smoker    MyChart Instructions Given

## 2018-12-05 LAB
GEN CATEG CVX/VAG CYTO-IMP: NORMAL
LAB AP CASE REPORT: NORMAL
LAB AP GYN ADDITIONAL INFORMATION: NORMAL
LAB AP GYN OTHER FINDINGS: NORMAL
PATH INTERP SPEC-IMP: NORMAL
STAT OF ADQ CVX/VAG CYTO-IMP: NORMAL

## 2019-02-04 DIAGNOSIS — Z12.39 ENCOUNTER FOR SCREENING FOR MALIGNANT NEOPLASM OF BREAST: ICD-10-CM

## 2019-11-05 ENCOUNTER — OFFICE VISIT (OUTPATIENT)
Dept: FAMILY MEDICINE CLINIC | Facility: CLINIC | Age: 44
End: 2019-11-05

## 2019-11-05 VITALS
HEART RATE: 95 BPM | HEIGHT: 71 IN | TEMPERATURE: 98.6 F | RESPIRATION RATE: 18 BRPM | SYSTOLIC BLOOD PRESSURE: 112 MMHG | BODY MASS INDEX: 26.18 KG/M2 | DIASTOLIC BLOOD PRESSURE: 72 MMHG | WEIGHT: 187 LBS | OXYGEN SATURATION: 98 %

## 2019-11-05 DIAGNOSIS — J02.0 STREP PHARYNGITIS: ICD-10-CM

## 2019-11-05 DIAGNOSIS — R50.9 FEVER, UNSPECIFIED FEVER CAUSE: Primary | ICD-10-CM

## 2019-11-05 LAB
EXPIRATION DATE: NORMAL
INTERNAL CONTROL: NORMAL
Lab: NORMAL
S PYO AG THROAT QL: NEGATIVE

## 2019-11-05 PROCEDURE — 99213 OFFICE O/P EST LOW 20 MIN: CPT | Performed by: FAMILY MEDICINE

## 2019-11-05 PROCEDURE — 87880 STREP A ASSAY W/OPTIC: CPT | Performed by: FAMILY MEDICINE

## 2019-11-05 RX ORDER — ONDANSETRON 4 MG/1
4 TABLET, FILM COATED ORAL EVERY 6 HOURS PRN
Qty: 10 TABLET | Refills: 0 | Status: SHIPPED | OUTPATIENT
Start: 2019-11-05 | End: 2020-06-26

## 2019-11-05 RX ORDER — AMOXICILLIN 500 MG/1
500 CAPSULE ORAL 2 TIMES DAILY
Qty: 20 CAPSULE | Refills: 0 | Status: SHIPPED | OUTPATIENT
Start: 2019-11-05 | End: 2019-11-15

## 2019-11-05 NOTE — PROGRESS NOTES
"Subjective cc: fever sore throat   Natalie Lawrence is a 44 y.o. female who presents with complaint of sore throat, not feeling well. Her  and son both have strep. She has also been having some nausea and diarrhea.      Nausea   This is a new problem. The current episode started yesterday. The problem occurs daily. The problem has been waxing and waning. Associated symptoms include anorexia, a change in bowel habit, chills, fatigue, a fever, myalgias, nausea, a sore throat and swollen glands. Pertinent negatives include no abdominal pain, arthralgias, chest pain, congestion, coughing, diaphoresis, headaches, joint swelling, neck pain, numbness, rash, urinary symptoms, vertigo, visual change, vomiting or weakness. Nothing aggravates the symptoms. She has tried rest and sleep for the symptoms. The treatment provided no relief.        The following portions of the patient's history were reviewed and updated as appropriate: allergies, current medications, past family history, past medical history, past social history, past surgical history and problem list.        Review of Systems   Constitutional: Positive for activity change, appetite change, chills, fatigue and fever. Negative for diaphoresis.   HENT: Positive for sore throat. Negative for congestion.    Respiratory: Negative for cough.    Cardiovascular: Negative for chest pain.   Gastrointestinal: Positive for anorexia, change in bowel habit, diarrhea and nausea. Negative for abdominal pain and vomiting.   Musculoskeletal: Positive for myalgias. Negative for arthralgias, joint swelling and neck pain.   Skin: Negative for rash.   Neurological: Negative for vertigo, weakness, numbness and headaches.   All other systems reviewed and are negative.      Objective   Blood pressure 112/72, pulse 95, temperature 98.6 °F (37 °C), temperature source Oral, resp. rate 18, height 180.3 cm (71\"), weight 84.8 kg (187 lb), SpO2 98 %, not currently breastfeeding.  Physical " Exam   Constitutional: She is oriented to person, place, and time. She appears well-developed and well-nourished. She is active and cooperative.  Non-toxic appearance. She has a sickly appearance. No distress.   HENT:   Head: Normocephalic and atraumatic.   Right Ear: Hearing, tympanic membrane, external ear and ear canal normal.   Left Ear: Hearing, tympanic membrane, external ear and ear canal normal.   Nose: Nose normal. Right sinus exhibits no maxillary sinus tenderness and no frontal sinus tenderness. Left sinus exhibits no maxillary sinus tenderness and no frontal sinus tenderness.   Mouth/Throat: Mucous membranes are normal. Posterior oropharyngeal erythema present.   Eyes: Conjunctivae and EOM are normal. Right eye exhibits no discharge. Left eye exhibits no discharge.   Neck: Normal range of motion. No tracheal deviation present. No thyromegaly present.   Cardiovascular: Regular rhythm, normal heart sounds and intact distal pulses.   Pulmonary/Chest: Effort normal and breath sounds normal. No stridor. No respiratory distress. She has no wheezes. She exhibits no tenderness.   Abdominal: Soft. Bowel sounds are normal. She exhibits no distension. There is no tenderness.   Musculoskeletal: Normal range of motion. She exhibits no edema.   Lymphadenopathy:     She has cervical adenopathy.   Neurological: She is alert and oriented to person, place, and time. She exhibits normal muscle tone. Coordination normal.   Skin: Skin is warm and dry. She is not diaphoretic.   Psychiatric: She has a normal mood and affect. Her behavior is normal. Judgment and thought content normal.   Nursing note and vitals reviewed.      Assessment/Plan   Problems Addressed this Visit     None      Visit Diagnoses     Fever, unspecified fever cause    -  Primary    Relevant Orders    POCT rapid strep A (Completed)    Strep pharyngitis        Relevant Medications    amoxicillin (AMOXIL) 500 MG capsule        PLAN:     #1 strep: new, will  treat based on symptoms and her recent sick contacts of son and  having strep, advised to complete course, advised this also could be just a viral illness that needs to run its course - advised on conservative management and warning signs, retunr if not imrpovign           This document has been electronically signed by Emelyn Ashby MD on November 5, 2019 10:25 AM

## 2019-11-08 LAB — S PYO THROAT QL CULT: NEGATIVE

## 2019-12-27 ENCOUNTER — OFFICE VISIT (OUTPATIENT)
Dept: FAMILY MEDICINE CLINIC | Facility: CLINIC | Age: 44
End: 2019-12-27

## 2019-12-27 VITALS
WEIGHT: 184 LBS | SYSTOLIC BLOOD PRESSURE: 146 MMHG | HEIGHT: 71 IN | RESPIRATION RATE: 18 BRPM | DIASTOLIC BLOOD PRESSURE: 80 MMHG | TEMPERATURE: 100.1 F | HEART RATE: 83 BPM | OXYGEN SATURATION: 98 % | BODY MASS INDEX: 25.76 KG/M2

## 2019-12-27 DIAGNOSIS — R50.9 FEVER, UNSPECIFIED FEVER CAUSE: Primary | ICD-10-CM

## 2019-12-27 DIAGNOSIS — J10.1 INFLUENZA A: ICD-10-CM

## 2019-12-27 LAB
EXPIRATION DATE: ABNORMAL
FLUAV AG NPH QL: POSITIVE
FLUBV AG NPH QL: NEGATIVE
INTERNAL CONTROL: ABNORMAL
Lab: ABNORMAL

## 2019-12-27 PROCEDURE — 99213 OFFICE O/P EST LOW 20 MIN: CPT | Performed by: NURSE PRACTITIONER

## 2019-12-27 PROCEDURE — 87804 INFLUENZA ASSAY W/OPTIC: CPT | Performed by: NURSE PRACTITIONER

## 2019-12-27 RX ORDER — OSELTAMIVIR PHOSPHATE 75 MG/1
75 CAPSULE ORAL 2 TIMES DAILY
Qty: 10 CAPSULE | Refills: 0 | Status: SHIPPED | OUTPATIENT
Start: 2019-12-27 | End: 2020-06-26

## 2019-12-27 NOTE — PROGRESS NOTES
Subjective   Natalie Lawrence is a 44 y.o. female.     URI    This is a new problem. The current episode started in the past 7 days (3 days ago). The problem has been unchanged. The maximum temperature recorded prior to her arrival was 102 - 102.9 F. The fever has been present for 1 to 2 days. Associated symptoms include congestion, coughing (nonproductive), headaches, a plugged ear sensation and rhinorrhea. Pertinent negatives include no ear pain, nausea, sore throat, vomiting or wheezing. Treatments tried: amoxicillin x4 days. The treatment provided moderate relief.        The following portions of the patient's history were reviewed and updated as appropriate: allergies, current medications, past family history, past medical history, past social history, past surgical history and problem list.    Review of Systems   Constitutional: Positive for activity change, chills, diaphoresis, fatigue and fever. Negative for appetite change.   HENT: Positive for congestion, postnasal drip and rhinorrhea. Negative for ear pain and sore throat.    Respiratory: Positive for cough (nonproductive). Negative for wheezing.    Gastrointestinal: Negative for nausea and vomiting.   Musculoskeletal: Positive for myalgias.   Neurological: Positive for headaches.       Objective     Vitals:    12/27/19 1036   BP: 146/80   Pulse: 83   Resp: 18   Temp: 100.1 °F (37.8 °C)   SpO2: 98%       Physical Exam   Constitutional: She appears well-developed and well-nourished. She appears ill. No distress.   HENT:   Right Ear: Tympanic membrane and ear canal normal.   Left Ear: Tympanic membrane and ear canal normal.   Nose: Mucosal edema and rhinorrhea present. Right sinus exhibits no maxillary sinus tenderness and no frontal sinus tenderness. Left sinus exhibits no maxillary sinus tenderness and no frontal sinus tenderness.   Mouth/Throat: Uvula is midline. No uvula swelling. Oropharyngeal exudate present.   Eyes: Conjunctivae are normal.   Neck: Neck  supple. No tracheal deviation present. No thyromegaly present.   Cardiovascular: Normal rate, regular rhythm and normal heart sounds.   Pulmonary/Chest: Effort normal and breath sounds normal.   Lymphadenopathy:     She has no cervical adenopathy.   Neurological: She is alert.   Skin: Skin is warm and dry.   Psychiatric: She has a normal mood and affect. Her behavior is normal.   Nursing note and vitals reviewed.         Patient's Body mass index is 25.66 kg/m². BMI is above normal parameters. Recommendations include: nutrition counseling.       Assessment/Plan   Natalie was seen today for influenza, earache and headache.    Diagnoses and all orders for this visit:    Fever, unspecified fever cause  -     POCT Influenza A/B    Influenza A    Other orders  -     oseltamivir (TAMIFLU) 75 MG capsule; Take 1 capsule by mouth 2 (Two) Times a Day.          Return in about 1 week (around 1/3/2020), or if symptoms worsen or fail to improve.             Electronically signed by MAIA Marin, 12/27/19, 10:49 AM.

## 2020-06-26 ENCOUNTER — OFFICE VISIT (OUTPATIENT)
Dept: OBSTETRICS AND GYNECOLOGY | Facility: CLINIC | Age: 45
End: 2020-06-26

## 2020-06-26 VITALS
BODY MASS INDEX: 24.64 KG/M2 | HEIGHT: 71 IN | SYSTOLIC BLOOD PRESSURE: 140 MMHG | WEIGHT: 176 LBS | DIASTOLIC BLOOD PRESSURE: 80 MMHG

## 2020-06-26 DIAGNOSIS — F41.9 ANXIETY: ICD-10-CM

## 2020-06-26 DIAGNOSIS — E55.9 VITAMIN D DEFICIENCY: ICD-10-CM

## 2020-06-26 DIAGNOSIS — N39.0 URINARY TRACT INFECTION WITHOUT HEMATURIA, SITE UNSPECIFIED: ICD-10-CM

## 2020-06-26 DIAGNOSIS — Z12.31 ENCOUNTER FOR SCREENING MAMMOGRAM FOR BREAST CANCER: ICD-10-CM

## 2020-06-26 DIAGNOSIS — D22.9 MULTIPLE ATYPICAL SKIN MOLES: ICD-10-CM

## 2020-06-26 DIAGNOSIS — Z01.419 WELL WOMAN EXAM WITH ROUTINE GYNECOLOGICAL EXAM: Primary | ICD-10-CM

## 2020-06-26 PROCEDURE — 87624 HPV HI-RISK TYP POOLED RSLT: CPT | Performed by: NURSE PRACTITIONER

## 2020-06-26 PROCEDURE — G0123 SCREEN CERV/VAG THIN LAYER: HCPCS | Performed by: NURSE PRACTITIONER

## 2020-06-26 PROCEDURE — 99396 PREV VISIT EST AGE 40-64: CPT | Performed by: NURSE PRACTITIONER

## 2020-06-26 RX ORDER — ALPRAZOLAM 0.25 MG/1
0.25 TABLET ORAL 3 TIMES DAILY PRN
Qty: 60 TABLET | Refills: 0 | Status: SHIPPED | OUTPATIENT
Start: 2020-06-26 | End: 2021-04-10

## 2020-06-26 RX ORDER — NITROFURANTOIN 25; 75 MG/1; MG/1
100 CAPSULE ORAL 2 TIMES DAILY
Qty: 20 CAPSULE | Refills: 5 | Status: SHIPPED | OUTPATIENT
Start: 2020-06-26 | End: 2020-12-11

## 2020-06-26 NOTE — PROGRESS NOTES
"Subjective     Natalie Lawrence is a 45 y.o. female    Gynecologic Exam   The patient's pertinent negatives include no pelvic pain, vaginal bleeding or vaginal discharge. The patient is experiencing no pain. Pertinent negatives include no abdominal pain, anorexia, back pain, chills, constipation, diarrhea, discolored urine, dysuria, fever, flank pain, frequency, headaches, hematuria, joint pain, joint swelling, nausea, painful intercourse, rash, sore throat, urgency or vomiting. She is sexually active. She uses condoms for contraception. Her menstrual history has been regular.         /80   Ht 180.3 cm (71\")   Wt 79.8 kg (176 lb)   LMP 06/20/2020 (Exact Date)   Breastfeeding No   BMI 24.55 kg/m²     Outpatient Encounter Medications as of 6/26/2020   Medication Sig Dispense Refill   • ALPRAZolam (XANAX) 0.25 MG tablet Take 1 tablet by mouth 3 (Three) Times a Day As Needed for Anxiety. 60 tablet 0   • cholecalciferol (VITAMIN D3) 1000 UNITS tablet Take 5,000 Units by mouth Daily.     • Multiple Vitamins-Minerals (DAILY VITAMIN FORMULA+MINERALS PO) Take  by mouth.     • nitrofurantoin, macrocrystal-monohydrate, (MACROBID) 100 MG capsule Take 1 capsule by mouth 2 (Two) Times a Day. 20 capsule 5   • NON FORMULARY Daily. OTC eye vitamin     • [DISCONTINUED] ALPRAZolam (XANAX) 0.25 MG tablet Take 1 tablet by mouth 3 (Three) Times a Day As Needed for Anxiety. (Patient taking differently: Take 0.25 mg by mouth As Needed for Anxiety.) 60 tablet 0   • [DISCONTINUED] nitrofurantoin, macrocrystal-monohydrate, (MACROBID) 100 MG capsule Take 1 capsule by mouth 2 (Two) Times a Day. (Patient taking differently: Take 100 mg by mouth As Needed.) 20 capsule 5   • [DISCONTINUED] ondansetron (ZOFRAN) 4 MG tablet Take 1 tablet by mouth Every 6 (Six) Hours As Needed for Nausea or Vomiting. 10 tablet 0   • [DISCONTINUED] oseltamivir (TAMIFLU) 75 MG capsule Take 1 capsule by mouth 2 (Two) Times a Day. 10 capsule 0     No " facility-administered encounter medications on file as of 2020.        Surgical History  Past Surgical History:   Procedure Laterality Date   •  SECTION     • DILATATION AND CURETTAGE     • LYMPH NODE BIOPSY         Family History  Family History   Problem Relation Age of Onset   • Heart disease Father    • Diabetes Father    • Hypertension Father    • Prostate cancer Father 60   • No Known Problems Mother    • Stroke Paternal Grandfather    • No Known Problems Son    • Breast cancer Neg Hx    • Ovarian cancer Neg Hx    • Uterine cancer Neg Hx    • Colon cancer Neg Hx    • Melanoma Neg Hx        The following portions of the patient's history were reviewed and updated as appropriate: allergies, current medications, past family history, past medical history, past social history, past surgical history and problem list.    Review of Systems   Constitutional: Negative for activity change, appetite change, chills, diaphoresis, fatigue, fever, unexpected weight gain and unexpected weight loss.   HENT: Negative for congestion, dental problem, drooling, ear discharge, ear pain, facial swelling, hearing loss, mouth sores, nosebleeds, postnasal drip, rhinorrhea, sinus pressure, sneezing, sore throat, swollen glands, tinnitus, trouble swallowing and voice change.    Eyes: Negative for blurred vision, double vision, photophobia, pain, discharge, redness, itching and visual disturbance.   Respiratory: Negative for apnea, cough, choking, chest tightness, shortness of breath, wheezing and stridor.    Cardiovascular: Negative for chest pain, palpitations and leg swelling.   Gastrointestinal: Negative for abdominal distention, abdominal pain, anal bleeding, anorexia, blood in stool, constipation, diarrhea, nausea, rectal pain, vomiting, GERD and indigestion.   Endocrine: Negative for cold intolerance, heat intolerance, polydipsia, polyphagia and polyuria.   Genitourinary: Negative for amenorrhea, breast discharge,  breast lump, breast pain, decreased libido, decreased urine volume, difficulty urinating, dyspareunia, dysuria, flank pain, frequency, genital sores, hematuria, menstrual problem, pelvic pain, pelvic pressure, urgency, urinary incontinence, vaginal bleeding, vaginal discharge and vaginal pain.   Musculoskeletal: Negative for arthralgias, back pain, gait problem, joint pain, joint swelling, myalgias, neck pain, neck stiffness and bursitis.   Skin: Negative for color change, dry skin and rash.   Allergic/Immunologic: Negative for environmental allergies, food allergies and immunocompromised state.   Neurological: Negative for dizziness, tremors, seizures, syncope, facial asymmetry, speech difficulty, weakness, light-headedness, numbness, headache, memory problem and confusion.   Hematological: Negative for adenopathy. Does not bruise/bleed easily.   Psychiatric/Behavioral: Negative for agitation, behavioral problems, decreased concentration, dysphoric mood, hallucinations, self-injury, sleep disturbance, suicidal ideas, negative for hyperactivity, depressed mood and stress. The patient is not nervous/anxious.        Objective   Physical Exam   Constitutional: She is oriented to person, place, and time. She appears well-developed and well-nourished. No distress.   HENT:   Head: Normocephalic.   Right Ear: External ear normal.   Left Ear: External ear normal.   Nose: Nose normal.   Mouth/Throat: Oropharynx is clear and moist.   Eyes: Conjunctivae are normal. Right eye exhibits no discharge. Left eye exhibits no discharge. No scleral icterus.   Neck: Normal range of motion. Neck supple. Carotid bruit is not present. No tracheal deviation present. No thyromegaly present.   Cardiovascular: Normal rate, regular rhythm, normal heart sounds and intact distal pulses.   No murmur heard.  Pulmonary/Chest: Effort normal and breath sounds normal. No respiratory distress. She has no wheezes. Right breast exhibits no inverted  nipple, no mass, no nipple discharge, no skin change and no tenderness. Left breast exhibits no inverted nipple, no mass, no nipple discharge, no skin change and no tenderness. No breast swelling, tenderness, discharge or bleeding. Breasts are symmetrical.   Abdominal: Soft. She exhibits no distension and no mass. There is no tenderness. There is no guarding. No hernia. Hernia confirmed negative in the right inguinal area and confirmed negative in the left inguinal area.   Genitourinary: Rectum normal, vagina normal and uterus normal. Rectal exam shows no mass. No breast swelling, tenderness, discharge or bleeding. Pelvic exam was performed with patient supine. There is no rash, tenderness, lesion or injury on the right labia. There is no rash, tenderness, lesion or injury on the left labia. Uterus is not enlarged, not fixed and not tender. Cervix exhibits no motion tenderness, no discharge and no friability. Right adnexum displays no mass, no tenderness and no fullness. Left adnexum displays no mass, no tenderness and no fullness. No erythema, tenderness or bleeding in the vagina. No foreign body in the vagina. No signs of injury around the vagina. No vaginal discharge found.   Genitourinary Comments:   BSU normal  Urethral meatus  Normal  Perineum  Normal   Musculoskeletal: Normal range of motion. She exhibits no edema or tenderness.   Lymphadenopathy:        Head (right side): No submental, no submandibular, no tonsillar, no preauricular, no posterior auricular and no occipital adenopathy present.        Head (left side): No submental, no submandibular, no tonsillar, no preauricular, no posterior auricular and no occipital adenopathy present.     She has no cervical adenopathy.        Right cervical: No superficial cervical, no deep cervical and no posterior cervical adenopathy present.       Left cervical: No superficial cervical, no deep cervical and no posterior cervical adenopathy present.     She has no  axillary adenopathy.        Right: No inguinal adenopathy present.        Left: No inguinal adenopathy present.   Neurological: She is alert and oriented to person, place, and time. Coordination normal.   Skin: Skin is warm and dry. No bruising and no rash noted. She is not diaphoretic. No erythema.        Psychiatric: She has a normal mood and affect. Her behavior is normal. Judgment and thought content normal.   Nursing note and vitals reviewed.      Assessment/Plan   Natalie was seen today for gynecologic exam.    Diagnoses and all orders for this visit:    Well woman exam with routine gynecological exam  Normal GYN exam. Will have lab work today. Encouraged SBE, pt is aware how to do self breast exam and the importance of same. Discussed weight management and importance of maintaining a healthy weight. Discussed Vitamin D intake and the importance of adequate vitamin D for both Bone Health and a healthy immune system.  Discussed Daily exercise and the importance of same, in regards to a healthy heart as well as helping to maintain her weight and improving her mental health.  BMI 24.6.  Colonoscopy is up to date.  Mammogram will be scheduled at [facility] per patient request today.  Pap smear is done per ASCCP guidelines.  -     Liquid-based Pap Smear, Screening  -     CBC & Differential  -     Comprehensive Metabolic Panel  -     Lipid Panel With LDL / HDL Ratio  -     TSH  -     T3, Uptake  -     T4, Free  -     Hemoglobin A1c  -     UA / M With / Rflx Culture([facility] ONLY) - Urine, Clean Catch    Encounter for screening mammogram for breast cancer  Patient will have mammogram at [facility] per her request.  -     Mammo Screening Digital Tomosynthesis Bilateral With CAD; Future    Vitamin D deficiency  Comments:  Patient will have labs drawn today.  Orders:  -     Vitamin D 25 Hydroxy    Anxiety  Comments:  Patient reports she has been on Xanax .25 mg in the past and it was very effective for managing her anxiety.   Requests a new prescription.  Natanael signed.    Orders:  -     ALPRAZolam (XANAX) 0.25 MG tablet; Take 1 tablet by mouth 3 (Three) Times a Day As Needed for Anxiety.    Urinary tract infection without hematuria, site unspecified  Comments:  Has frequent post coital UTI. Given Macrobid to use as needed.  Orders:  -     nitrofurantoin, macrocrystal-monohydrate, (MACROBID) 100 MG capsule; Take 1 capsule by mouth 2 (Two) Times a Day.    Multiple atypical skin moles  Comments:  Patient has seen Dr. Martinez in the past and will see her again.         Patient's Body mass index is 24.55 kg/m². BMI is within normal parameters. No follow-up required..      Nancy Erwin, APRN  6/26/2020

## 2020-06-26 NOTE — PATIENT INSTRUCTIONS

## 2020-06-27 LAB
25(OH)D3+25(OH)D2 SERPL-MCNC: 52.3 NG/ML (ref 30–100)
ALBUMIN SERPL-MCNC: 4.7 G/DL (ref 3.5–5.2)
ALBUMIN/GLOB SERPL: 1.6 G/DL
ALP SERPL-CCNC: 56 U/L (ref 39–117)
ALT SERPL-CCNC: 16 U/L (ref 1–33)
APPEARANCE UR: CLEAR
AST SERPL-CCNC: 15 U/L (ref 1–32)
BACTERIA #/AREA URNS HPF: NORMAL /HPF
BASOPHILS # BLD AUTO: 0.02 10*3/MM3 (ref 0–0.2)
BASOPHILS NFR BLD AUTO: 0.5 % (ref 0–1.5)
BILIRUB SERPL-MCNC: 0.4 MG/DL (ref 0.2–1.2)
BILIRUB UR QL STRIP: NEGATIVE
BUN SERPL-MCNC: 14 MG/DL (ref 6–20)
BUN/CREAT SERPL: 20.3 (ref 7–25)
CALCIUM SERPL-MCNC: 9.1 MG/DL (ref 8.6–10.5)
CHLORIDE SERPL-SCNC: 102 MMOL/L (ref 98–107)
CHOLEST SERPL-MCNC: 135 MG/DL (ref 0–200)
CO2 SERPL-SCNC: 22.8 MMOL/L (ref 22–29)
COLOR UR: YELLOW
CREAT SERPL-MCNC: 0.69 MG/DL (ref 0.57–1)
EOSINOPHIL # BLD AUTO: 0.08 10*3/MM3 (ref 0–0.4)
EOSINOPHIL NFR BLD AUTO: 2 % (ref 0.3–6.2)
EPI CELLS #/AREA URNS HPF: NORMAL /HPF (ref 0–10)
ERYTHROCYTE [DISTWIDTH] IN BLOOD BY AUTOMATED COUNT: 12.5 % (ref 12.3–15.4)
GLOBULIN SER CALC-MCNC: 2.9 GM/DL
GLUCOSE SERPL-MCNC: 99 MG/DL (ref 65–99)
GLUCOSE UR QL: NEGATIVE
HBA1C MFR BLD: 5.39 % (ref 4.8–5.6)
HCT VFR BLD AUTO: 39.9 % (ref 34–46.6)
HDLC SERPL-MCNC: 56 MG/DL (ref 40–60)
HGB BLD-MCNC: 13.4 G/DL (ref 12–15.9)
HGB UR QL STRIP: ABNORMAL
IMM GRANULOCYTES # BLD AUTO: 0 10*3/MM3 (ref 0–0.05)
IMM GRANULOCYTES NFR BLD AUTO: 0 % (ref 0–0.5)
KETONES UR QL STRIP: NEGATIVE
LDLC SERPL CALC-MCNC: 70 MG/DL (ref 0–100)
LDLC/HDLC SERPL: 1.24 {RATIO}
LEUKOCYTE ESTERASE UR QL STRIP: NEGATIVE
LYMPHOCYTES # BLD AUTO: 1.33 10*3/MM3 (ref 0.7–3.1)
LYMPHOCYTES NFR BLD AUTO: 33.2 % (ref 19.6–45.3)
MCH RBC QN AUTO: 29.9 PG (ref 26.6–33)
MCHC RBC AUTO-ENTMCNC: 33.6 G/DL (ref 31.5–35.7)
MCV RBC AUTO: 89.1 FL (ref 79–97)
MICRO URNS: ABNORMAL
MONOCYTES # BLD AUTO: 0.37 10*3/MM3 (ref 0.1–0.9)
MONOCYTES NFR BLD AUTO: 9.2 % (ref 5–12)
NEUTROPHILS # BLD AUTO: 2.21 10*3/MM3 (ref 1.7–7)
NEUTROPHILS NFR BLD AUTO: 55.1 % (ref 42.7–76)
NITRITE UR QL STRIP: NEGATIVE
NRBC BLD AUTO-RTO: 0 /100 WBC (ref 0–0.2)
PH UR STRIP: 5.5 [PH] (ref 5–7.5)
PLATELET # BLD AUTO: 214 10*3/MM3 (ref 140–450)
POTASSIUM SERPL-SCNC: 4.2 MMOL/L (ref 3.5–5.2)
PROT SERPL-MCNC: 7.6 G/DL (ref 6–8.5)
PROT UR QL STRIP: NEGATIVE
RBC # BLD AUTO: 4.48 10*6/MM3 (ref 3.77–5.28)
RBC #/AREA URNS HPF: NORMAL /HPF (ref 0–2)
SODIUM SERPL-SCNC: 137 MMOL/L (ref 136–145)
SP GR UR: 1.01 (ref 1–1.03)
T3RU NFR SERPL: 26 % (ref 24–39)
T4 FREE SERPL-MCNC: 1.15 NG/DL (ref 0.93–1.7)
TRIGL SERPL-MCNC: 47 MG/DL (ref 0–150)
TSH SERPL DL<=0.005 MIU/L-ACNC: 1.33 UIU/ML (ref 0.27–4.2)
URINALYSIS REFLEX: ABNORMAL
UROBILINOGEN UR STRIP-MCNC: 0.2 MG/DL (ref 0.2–1)
VLDLC SERPL CALC-MCNC: 9.4 MG/DL
WBC # BLD AUTO: 4.01 10*3/MM3 (ref 3.4–10.8)
WBC #/AREA URNS HPF: NORMAL /HPF (ref 0–5)

## 2020-07-01 LAB
GEN CATEG CVX/VAG CYTO-IMP: NORMAL
HPV I/H RISK 4 DNA CVX QL PROBE+SIG AMP: NOT DETECTED
LAB AP CASE REPORT: NORMAL
PATH INTERP SPEC-IMP: NORMAL
STAT OF ADQ CVX/VAG CYTO-IMP: NORMAL

## 2020-07-22 DIAGNOSIS — Z12.31 ENCOUNTER FOR SCREENING MAMMOGRAM FOR BREAST CANCER: ICD-10-CM

## 2020-10-26 ENCOUNTER — FLU SHOT (OUTPATIENT)
Dept: FAMILY MEDICINE CLINIC | Facility: CLINIC | Age: 45
End: 2020-10-26

## 2020-10-26 VITALS — TEMPERATURE: 97.1 F

## 2020-10-26 DIAGNOSIS — Z23 FLU VACCINE NEED: Primary | ICD-10-CM

## 2020-10-26 PROCEDURE — 90471 IMMUNIZATION ADMIN: CPT | Performed by: NURSE PRACTITIONER

## 2020-10-26 PROCEDURE — 90686 IIV4 VACC NO PRSV 0.5 ML IM: CPT | Performed by: NURSE PRACTITIONER

## 2020-12-11 ENCOUNTER — OFFICE VISIT (OUTPATIENT)
Dept: OBSTETRICS AND GYNECOLOGY | Facility: CLINIC | Age: 45
End: 2020-12-11

## 2020-12-11 VITALS
WEIGHT: 176 LBS | DIASTOLIC BLOOD PRESSURE: 78 MMHG | HEIGHT: 71 IN | BODY MASS INDEX: 24.64 KG/M2 | SYSTOLIC BLOOD PRESSURE: 140 MMHG

## 2020-12-11 DIAGNOSIS — N93.8 DUB (DYSFUNCTIONAL UTERINE BLEEDING): Primary | ICD-10-CM

## 2020-12-11 PROCEDURE — 99213 OFFICE O/P EST LOW 20 MIN: CPT | Performed by: NURSE PRACTITIONER

## 2020-12-11 NOTE — PATIENT INSTRUCTIONS
"BMI for Adults  What is BMI?  Body mass index (BMI) is a number that is calculated from a person's weight and height. BMI can help estimate how much of a person's weight is composed of fat. BMI does not measure body fat directly. Rather, it is an alternative to procedures that directly measure body fat, which can be difficult and expensive.  BMI can help identify people who may be at higher risk for certain medical problems.  What are BMI measurements used for?  BMI is used as a screening tool to identify possible weight problems. It helps determine whether a person is obese, overweight, a healthy weight, or underweight.  BMI is useful for:  · Identifying a weight problem that may be related to a medical condition or may increase the risk for medical problems.  · Promoting changes, such as changes in diet and exercise, to help reach a healthy weight. BMI screening can be repeated to see if these changes are working.  How is BMI calculated?  BMI involves measuring your weight in relation to your height. Both height and weight are measured, and the BMI is calculated from those numbers. This can be done either in English ([place] or metric measurements. Note that charts and online BMI calculators are available to help you find your BMI quickly and easily without having to do these calculations yourself.  To calculate your BMI in English ([place] measurements:    1. Measure your weight in pounds (lb).  2. Multiply the number of pounds by 703.  ? For example, for a person who weighs 180 lb, multiply that number by 703, which equals 126,540.  3. Measure your height in inches. Then multiply that number by itself to get a measurement called \"inches squared.\"  ? For example, for a person who is 70 inches tall, the \"inches squared\" measurement is 70 inches x 70 inches, which equals 4,900 inches squared.  4. Divide the total from step 2 (number of lb x 703) by the total from step 3 (inches squared): 126,540 ÷ 4,900 = 25.8. This is " "your BMI.  To calculate your BMI in metric measurements:  1. Measure your weight in kilograms (kg).  2. Measure your height in meters (m). Then multiply that number by itself to get a measurement called \"meters squared.\"  ? For example, for a person who is 1.75 m tall, the \"meters squared\" measurement is 1.75 m x 1.75 m, which is equal to 3.1 meters squared.  3. Divide the number of kilograms (your weight) by the meters squared number. In this example: 70 ÷ 3.1 = 22.6. This is your BMI.  What do the results mean?  BMI charts are used to identify whether you are underweight, normal weight, overweight, or obese. The following guidelines will be used:  · Underweight: BMI less than 18.5.  · Normal weight: BMI between 18.5 and 24.9.  · Overweight: BMI between 25 and 29.9.  · Obese: BMI of 30 or above.  Keep these notes in mind:  · Weight includes both fat and muscle, so someone with a muscular build, such as an athlete, may have a BMI that is higher than 24.9. In cases like these, BMI is not an accurate measure of body fat.  · To determine if excess body fat is the cause of a BMI of 25 or higher, further assessments may need to be done by a health care provider.  · BMI is usually interpreted in the same way for men and women.  Where to find more information  For more information about BMI, including tools to quickly calculate your BMI, go to these websites:  · Centers for Disease Control and Prevention: www.cdc.gov  · American Heart Association: www.heart.org  · National Heart, Lung, and Blood [place]: www.nhlbi.nih.gov  Summary  · Body mass index (BMI) is a number that is calculated from a person's weight and height.  · BMI may help estimate how much of a person's weight is composed of fat. BMI can help identify those who may be at higher risk for certain medical problems.  · BMI can be measured using English measurements or metric measurements.  · BMI charts are used to identify whether you are underweight, normal " weight, overweight, or obese.  This information is not intended to replace advice given to you by your health care provider. Make sure you discuss any questions you have with your health care provider.  Document Revised: 09/09/2020 Document Reviewed: 07/17/2020  Elsevier Patient Education © 2020 [employer]

## 2020-12-11 NOTE — PROGRESS NOTES
"Subjective     Natalie Lawrence is a 45 y.o. female    Patient is here today for complaint of midcycle bleeding.  She had not had a steroid pack or had any other hormone changes.  She did however start an over-the-counter supplement for mood swings.  She feels it may be related to same.  She is very anxious about it however.    Vaginal Bleeding  The patient's primary symptoms include vaginal bleeding. The patient's pertinent negatives include no pelvic pain or vaginal discharge. The patient is experiencing no pain. Pertinent negatives include no abdominal pain, anorexia, back pain, chills, constipation, diarrhea, discolored urine, dysuria, fever, flank pain, frequency, headaches, hematuria, joint pain, joint swelling, nausea, painful intercourse, rash, sore throat, urgency or vomiting. The vaginal discharge was bloody. The vaginal bleeding is lighter than menses. She has not been passing clots. She has not been passing tissue. Nothing aggravates the symptoms. She has tried nothing for the symptoms. She is sexually active.         /78   Ht 180.3 cm (70.98\")   Wt 79.8 kg (176 lb)   LMP 2020 (Exact Date)   BMI 24.56 kg/m²     Outpatient Encounter Medications as of 2020   Medication Sig Dispense Refill   • ALPRAZolam (XANAX) 0.25 MG tablet Take 1 tablet by mouth 3 (Three) Times a Day As Needed for Anxiety. 60 tablet 0   • cholecalciferol (VITAMIN D3) 1000 UNITS tablet Take 5,000 Units by mouth Daily.     • Multiple Vitamins-Minerals (DAILY VITAMIN FORMULA+MINERALS PO) Take  by mouth.     • NON FORMULARY Daily. OTC eye vitamin     • [DISCONTINUED] nitrofurantoin, macrocrystal-monohydrate, (MACROBID) 100 MG capsule Take 1 capsule by mouth 2 (Two) Times a Day. 20 capsule 5     No facility-administered encounter medications on file as of 2020.        Surgical History  Past Surgical History:   Procedure Laterality Date   •  SECTION     • DILATATION AND CURETTAGE     • LYMPH NODE BIOPSY   "       Family History  Family History   Problem Relation Age of Onset   • Heart disease Father    • Diabetes Father    • Hypertension Father    • Prostate cancer Father 60   • No Known Problems Mother    • Stroke Paternal Grandfather    • No Known Problems Son    • Breast cancer Neg Hx    • Ovarian cancer Neg Hx    • Uterine cancer Neg Hx    • Colon cancer Neg Hx    • Melanoma Neg Hx        The following portions of the patient's history were reviewed and updated as appropriate: allergies, current medications, past family history, past medical history, past social history, past surgical history and problem list.    Review of Systems   Constitutional: Negative for activity change, appetite change, chills, diaphoresis, fatigue, fever, unexpected weight gain and unexpected weight loss.   HENT: Negative for congestion, dental problem, drooling, ear discharge, ear pain, facial swelling, hearing loss, mouth sores, nosebleeds, postnasal drip, rhinorrhea, sinus pressure, sneezing, sore throat, swollen glands, tinnitus, trouble swallowing and voice change.    Eyes: Negative for blurred vision, double vision, photophobia, pain, discharge, redness, itching and visual disturbance.   Respiratory: Negative for apnea, cough, choking, chest tightness, shortness of breath, wheezing and stridor.    Cardiovascular: Negative for chest pain, palpitations and leg swelling.   Gastrointestinal: Negative for abdominal distention, abdominal pain, anal bleeding, anorexia, blood in stool, constipation, diarrhea, nausea, rectal pain, vomiting, GERD and indigestion.   Endocrine: Negative for cold intolerance, heat intolerance, polydipsia, polyphagia and polyuria.   Genitourinary: Positive for vaginal bleeding. Negative for amenorrhea, breast discharge, breast lump, breast pain, decreased libido, decreased urine volume, difficulty urinating, dyspareunia, dysuria, flank pain, frequency, genital sores, hematuria, menstrual problem, pelvic pain,  pelvic pressure, urgency, urinary incontinence, vaginal discharge and vaginal pain.   Musculoskeletal: Negative for arthralgias, back pain, gait problem, joint pain, joint swelling, myalgias, neck pain, neck stiffness and bursitis.   Skin: Negative for color change, dry skin and rash.   Allergic/Immunologic: Negative for environmental allergies, food allergies and immunocompromised state.   Neurological: Negative for dizziness, tremors, seizures, syncope, facial asymmetry, speech difficulty, weakness, light-headedness, numbness, headache, memory problem and confusion.   Hematological: Negative for adenopathy. Does not bruise/bleed easily.   Psychiatric/Behavioral: Negative for agitation, behavioral problems, decreased concentration, dysphoric mood, hallucinations, self-injury, sleep disturbance, suicidal ideas, negative for hyperactivity, depressed mood and stress. The patient is not nervous/anxious.        Objective   Physical Exam  Vitals signs and nursing note reviewed.   Constitutional:       Appearance: She is well-developed.   HENT:      Head: Normocephalic and atraumatic.   Eyes:      General:         Right eye: No discharge.         Left eye: No discharge.      Conjunctiva/sclera: Conjunctivae normal.   Neck:      Musculoskeletal: Normal range of motion and neck supple.      Thyroid: No thyromegaly.   Cardiovascular:      Rate and Rhythm: Normal rate and regular rhythm.      Heart sounds: Normal heart sounds.   Pulmonary:      Effort: Pulmonary effort is normal.      Breath sounds: Normal breath sounds.   Skin:     General: Skin is warm and dry.   Neurological:      Mental Status: She is alert and oriented to person, place, and time.   Psychiatric:         Behavior: Behavior normal.         Thought Content: Thought content normal.         Judgment: Judgment normal.         Assessment/Plan   Diagnoses and all orders for this visit:    1. DUB (dysfunctional uterine bleeding) (Primary)  Comments:  Patient had  midcycle bleeding for the first time in her life this past month.  It has caused her extreme anxiety.  She had started an over-the-counter supplement for mood swings and felt it might be related to same.  However she is very anxious regarding the bleeding so she will return for pelvic ultrasound for evaluation.         Patient's Body mass index is 24.56 kg/m². BMI is within normal parameters. No follow-up required..      Nancy Erwin, APRN  12/11/2020

## 2021-01-27 ENCOUNTER — OFFICE VISIT (OUTPATIENT)
Dept: OBSTETRICS AND GYNECOLOGY | Facility: CLINIC | Age: 46
End: 2021-01-27

## 2021-01-27 VITALS
DIASTOLIC BLOOD PRESSURE: 82 MMHG | WEIGHT: 173 LBS | BODY MASS INDEX: 24.22 KG/M2 | HEIGHT: 71 IN | SYSTOLIC BLOOD PRESSURE: 152 MMHG

## 2021-01-27 DIAGNOSIS — N93.8 DUB (DYSFUNCTIONAL UTERINE BLEEDING): Primary | ICD-10-CM

## 2021-01-27 PROCEDURE — 99213 OFFICE O/P EST LOW 20 MIN: CPT | Performed by: NURSE PRACTITIONER

## 2021-01-27 NOTE — PROGRESS NOTES
"Subjective     Natalie Lawrence is a 45 y.o. female    Patient is here today for follow-up of PENNY ARNOLD.  She had one episode of midcycle spotting last month.  She has not had any spotting this month.  She did have an ultrasound done today.    Menstrual Problem  This is a new problem. The current episode started more than 1 month ago. The problem has been resolved. Pertinent negatives include no abdominal pain, anorexia, arthralgias, change in bowel habit, chest pain, chills, congestion, coughing, diaphoresis, fatigue, fever, headaches, joint swelling, myalgias, nausea, neck pain, numbness, rash, sore throat, swollen glands, urinary symptoms, vertigo, visual change, vomiting or weakness. Nothing aggravates the symptoms. She has tried nothing for the symptoms.         /82   Ht 180.3 cm (70.98\")   Wt 78.5 kg (173 lb)   LMP 2021 (Exact Date)   BMI 24.14 kg/m²     Outpatient Encounter Medications as of 2021   Medication Sig Dispense Refill   • ALPRAZolam (XANAX) 0.25 MG tablet Take 1 tablet by mouth 3 (Three) Times a Day As Needed for Anxiety. 60 tablet 0   • cholecalciferol (VITAMIN D3) 1000 UNITS tablet Take 5,000 Units by mouth Daily.     • Multiple Vitamins-Minerals (DAILY VITAMIN FORMULA+MINERALS PO) Take  by mouth.     • NON FORMULARY Daily. OTC eye vitamin       No facility-administered encounter medications on file as of 2021.        Surgical History  Past Surgical History:   Procedure Laterality Date   •  SECTION     • DILATATION AND CURETTAGE     • LYMPH NODE BIOPSY         Family History  Family History   Problem Relation Age of Onset   • Heart disease Father    • Diabetes Father    • Hypertension Father    • Prostate cancer Father 60   • No Known Problems Mother    • Stroke Paternal Grandfather    • No Known Problems Son    • Breast cancer Neg Hx    • Ovarian cancer Neg Hx    • Uterine cancer Neg Hx    • Colon cancer Neg Hx    • Melanoma Neg Hx        The following portions of the " patient's history were reviewed and updated as appropriate: allergies, current medications, past family history, past medical history, past social history, past surgical history and problem list.    Review of Systems   Constitutional: Negative for activity change, appetite change, chills, diaphoresis, fatigue, fever, unexpected weight gain and unexpected weight loss.   HENT: Negative for congestion, dental problem, drooling, ear discharge, ear pain, facial swelling, hearing loss, mouth sores, nosebleeds, postnasal drip, rhinorrhea, sinus pressure, sneezing, sore throat, swollen glands, tinnitus, trouble swallowing and voice change.    Eyes: Negative for blurred vision, double vision, photophobia, pain, discharge, redness, itching and visual disturbance.   Respiratory: Negative for apnea, cough, choking, chest tightness, shortness of breath, wheezing and stridor.    Cardiovascular: Negative for chest pain, palpitations and leg swelling.   Gastrointestinal: Negative for abdominal distention, abdominal pain, anal bleeding, anorexia, blood in stool, change in bowel habit, constipation, diarrhea, nausea, rectal pain, vomiting, GERD and indigestion.   Endocrine: Negative for cold intolerance, heat intolerance, polydipsia, polyphagia and polyuria.   Genitourinary: Positive for menstrual problem. Negative for amenorrhea, breast discharge, breast lump, breast pain, decreased libido, decreased urine volume, difficulty urinating, dyspareunia, dysuria, flank pain, frequency, genital sores, hematuria, pelvic pain, pelvic pressure, urgency, urinary incontinence, vaginal bleeding, vaginal discharge and vaginal pain.   Musculoskeletal: Negative for arthralgias, back pain, gait problem, joint swelling, myalgias, neck pain, neck stiffness and bursitis.   Skin: Negative for color change, dry skin and rash.   Allergic/Immunologic: Negative for environmental allergies, food allergies and immunocompromised state.   Neurological: Negative  for dizziness, vertigo, tremors, seizures, syncope, facial asymmetry, speech difficulty, weakness, light-headedness, numbness, headache, memory problem and confusion.   Hematological: Negative for adenopathy. Does not bruise/bleed easily.   Psychiatric/Behavioral: Negative for agitation, behavioral problems, decreased concentration, dysphoric mood, hallucinations, self-injury, sleep disturbance, suicidal ideas, negative for hyperactivity, depressed mood and stress. The patient is not nervous/anxious.        Objective   Physical Exam  Vitals signs and nursing note reviewed.   Constitutional:       Appearance: She is well-developed.   HENT:      Head: Normocephalic and atraumatic.   Eyes:      General:         Right eye: No discharge.         Left eye: No discharge.      Conjunctiva/sclera: Conjunctivae normal.   Neck:      Musculoskeletal: Normal range of motion and neck supple.      Thyroid: No thyromegaly.   Cardiovascular:      Rate and Rhythm: Normal rate and regular rhythm.      Heart sounds: Normal heart sounds.   Pulmonary:      Effort: Pulmonary effort is normal.      Breath sounds: Normal breath sounds.   Skin:     General: Skin is warm and dry.   Neurological:      Mental Status: She is alert and oriented to person, place, and time.   Psychiatric:         Behavior: Behavior normal.         Thought Content: Thought content normal.         Judgment: Judgment normal.         Assessment/Plan   Diagnoses and all orders for this visit:    1. DUB (dysfunctional uterine bleeding) (Primary)  Comments:  Patient had one episode of midcycle bleeding.  That was in December she has not had any this month.  Ultrasound done today was normal.  Her endometrium is slightly thickened at 1 cm however she is midcycle.  She is reassured this is normal at midcycle.  She will call if she has any more irregular bleeding and will get an ultrasound immediately after her period if that occurs.         Patient's Body mass index is 24.14  kg/m². BMI is within normal parameters. No follow-up required..      Nancy Erwin, APRN  1/27/2021

## 2021-01-27 NOTE — PATIENT INSTRUCTIONS
"BMI for Adults  What is BMI?  Body mass index (BMI) is a number that is calculated from a person's weight and height. BMI can help estimate how much of a person's weight is composed of fat. BMI does not measure body fat directly. Rather, it is an alternative to procedures that directly measure body fat, which can be difficult and expensive.  BMI can help identify people who may be at higher risk for certain medical problems.  What are BMI measurements used for?  BMI is used as a screening tool to identify possible weight problems. It helps determine whether a person is obese, overweight, a healthy weight, or underweight.  BMI is useful for:  · Identifying a weight problem that may be related to a medical condition or may increase the risk for medical problems.  · Promoting changes, such as changes in diet and exercise, to help reach a healthy weight. BMI screening can be repeated to see if these changes are working.  How is BMI calculated?  BMI involves measuring your weight in relation to your height. Both height and weight are measured, and the BMI is calculated from those numbers. This can be done either in English (U[place] or metric measurements. Note that charts and online BMI calculators are available to help you find your BMI quickly and easily without having to do these calculations yourself.  To calculate your BMI in English ([place] measurements:    1. Measure your weight in pounds (lb).  2. Multiply the number of pounds by 703.  ? For example, for a person who weighs 180 lb, multiply that number by 703, which equals 126,540.  3. Measure your height in inches. Then multiply that number by itself to get a measurement called \"inches squared.\"  ? For example, for a person who is 70 inches tall, the \"inches squared\" measurement is 70 inches x 70 inches, which equals 4,900 inches squared.  4. Divide the total from step 2 (number of lb x 703) by the total from step 3 (inches squared): 126,540 ÷ 4,900 = 25.8. This is " "your BMI.  To calculate your BMI in metric measurements:  1. Measure your weight in kilograms (kg).  2. Measure your height in meters (m). Then multiply that number by itself to get a measurement called \"meters squared.\"  ? For example, for a person who is 1.75 m tall, the \"meters squared\" measurement is 1.75 m x 1.75 m, which is equal to 3.1 meters squared.  3. Divide the number of kilograms (your weight) by the meters squared number. In this example: 70 ÷ 3.1 = 22.6. This is your BMI.  What do the results mean?  BMI charts are used to identify whether you are underweight, normal weight, overweight, or obese. The following guidelines will be used:  · Underweight: BMI less than 18.5.  · Normal weight: BMI between 18.5 and 24.9.  · Overweight: BMI between 25 and 29.9.  · Obese: BMI of 30 or above.  Keep these notes in mind:  · Weight includes both fat and muscle, so someone with a muscular build, such as an athlete, may have a BMI that is higher than 24.9. In cases like these, BMI is not an accurate measure of body fat.  · To determine if excess body fat is the cause of a BMI of 25 or higher, further assessments may need to be done by a health care provider.  · BMI is usually interpreted in the same way for men and women.  Where to find more information  For more information about BMI, including tools to quickly calculate your BMI, go to these websites:  · Centers for Disease Control and Prevention: www.cdc.gov  · American Heart Association: www.heart.org  · National Heart, Lung, and Blood [place]: www.nhlbi.nih.gov  Summary  · Body mass index (BMI) is a number that is calculated from a person's weight and height.  · BMI may help estimate how much of a person's weight is composed of fat. BMI can help identify those who may be at higher risk for certain medical problems.  · BMI can be measured using English measurements or metric measurements.  · BMI charts are used to identify whether you are underweight, normal " weight, overweight, or obese.  This information is not intended to replace advice given to you by your health care provider. Make sure you discuss any questions you have with your health care provider.  Document Revised: 09/09/2020 Document Reviewed: 07/17/2020  Elsevier Patient Education © 2020 [employer]

## 2021-03-03 ENCOUNTER — OFFICE VISIT (OUTPATIENT)
Dept: FAMILY MEDICINE CLINIC | Facility: CLINIC | Age: 46
End: 2021-03-03

## 2021-03-03 VITALS
WEIGHT: 173 LBS | HEART RATE: 100 BPM | HEIGHT: 70 IN | RESPIRATION RATE: 16 BRPM | OXYGEN SATURATION: 100 % | BODY MASS INDEX: 24.77 KG/M2 | SYSTOLIC BLOOD PRESSURE: 128 MMHG | TEMPERATURE: 97.5 F | DIASTOLIC BLOOD PRESSURE: 78 MMHG

## 2021-03-03 DIAGNOSIS — R30.0 DYSURIA: ICD-10-CM

## 2021-03-03 DIAGNOSIS — N30.00 ACUTE CYSTITIS WITHOUT HEMATURIA: Primary | ICD-10-CM

## 2021-03-03 LAB
BILIRUB BLD-MCNC: NEGATIVE MG/DL
CLARITY, POC: CLEAR
COLOR UR: ABNORMAL
GLUCOSE UR STRIP-MCNC: NEGATIVE MG/DL
KETONES UR QL: NEGATIVE
LEUKOCYTE EST, POC: NEGATIVE
NITRITE UR-MCNC: POSITIVE MG/ML
PH UR: 6 [PH] (ref 5–8)
PROT UR STRIP-MCNC: NEGATIVE MG/DL
RBC # UR STRIP: NEGATIVE /UL
SP GR UR: 1 (ref 1–1.03)
UROBILINOGEN UR QL: NORMAL

## 2021-03-03 PROCEDURE — 81003 URINALYSIS AUTO W/O SCOPE: CPT | Performed by: NURSE PRACTITIONER

## 2021-03-03 PROCEDURE — 99213 OFFICE O/P EST LOW 20 MIN: CPT | Performed by: NURSE PRACTITIONER

## 2021-03-03 RX ORDER — CEFDINIR 300 MG/1
300 CAPSULE ORAL 2 TIMES DAILY
Qty: 14 CAPSULE | Refills: 0 | Status: SHIPPED | OUTPATIENT
Start: 2021-03-03 | End: 2021-03-03

## 2021-03-03 RX ORDER — CIPROFLOXACIN 500 MG/1
500 TABLET, FILM COATED ORAL 2 TIMES DAILY
Qty: 14 TABLET | Refills: 0 | Status: SHIPPED | OUTPATIENT
Start: 2021-03-03 | End: 2021-03-08

## 2021-03-03 RX ORDER — PHENAZOPYRIDINE HYDROCHLORIDE 200 MG/1
200 TABLET, FILM COATED ORAL 3 TIMES DAILY PRN
Qty: 6 TABLET | Refills: 0 | Status: SHIPPED | OUTPATIENT
Start: 2021-03-03 | End: 2021-03-08 | Stop reason: SDUPTHER

## 2021-03-03 RX ORDER — FLUCONAZOLE 150 MG/1
150 TABLET ORAL ONCE
Qty: 1 TABLET | Refills: 0 | Status: SHIPPED | OUTPATIENT
Start: 2021-03-03 | End: 2021-03-03

## 2021-03-03 NOTE — PROGRESS NOTES
"Chief Complaint  Urinary Tract Infection (Pt reports she has a UTI )    Subjective          Natalie Lawrence presents to [facility] for uti s/s.   History of Present Illness  Burning and painful urination x 6 days. Report she has taken previously prescribed macrobid and cefdinir without any improvement in symptoms. Denies vaginal discharge. Denies fever, body aches, flank pain, n/v.        Objective   Vital Signs:   /78 (BP Location: Right arm, Patient Position: Sitting, Cuff Size: Adult)   Pulse 100   Temp 97.5 °F (36.4 °C) (Infrared)   Resp 16   Ht 177.8 cm (70\")   Wt 78.5 kg (173 lb)   SpO2 100%   BMI 24.82 kg/m²     Physical Exam  Vitals and nursing note reviewed.   Constitutional:       General: She is not in acute distress.     Appearance: She is well-developed.   Pulmonary:      Effort: Pulmonary effort is normal.   Abdominal:      General: Bowel sounds are normal.      Palpations: Abdomen is soft. There is no mass.      Tenderness: There is no abdominal tenderness. There is no right CVA tenderness or left CVA tenderness.   Skin:     General: Skin is warm and dry.   Neurological:      Mental Status: She is alert.        Result Review :                 Assessment and Plan    Diagnoses and all orders for this visit:    1. Acute cystitis without hematuria (Primary)  -     Urine Culture - Urine, Urine, Clean Catch    2. Dysuria  -     POCT urinalysis dipstick, multipro    Other orders  -     Discontinue: cefdinir (OMNICEF) 300 MG capsule; Take 1 capsule by mouth 2 (Two) Times a Day.  Dispense: 14 capsule; Refill: 0  -     Discontinue: phenazopyridine (Pyridium) 200 MG tablet; Take 1 tablet by mouth 3 (Three) Times a Day As Needed for Bladder Spasms.  Dispense: 6 tablet; Refill: 0  -     fluconazole (Diflucan) 150 MG tablet; Take 1 tablet by mouth 1 (One) Time for 1 dose.  Dispense: 1 tablet; Refill: 0  -     ciprofloxacin (Cipro) 500 MG tablet; Take 1 tablet by mouth 2 " (Two) Times a Day.  Dispense: 14 tablet; Refill: 0        Follow Up   Return in about 1 week (around 3/10/2021), or if symptoms worsen or fail to improve.  Patient was given instructions and counseling regarding her condition or for health maintenance advice. Please see specific information pulled into the AVS if appropriate.

## 2021-03-05 ENCOUNTER — TELEPHONE (OUTPATIENT)
Dept: FAMILY MEDICINE CLINIC | Facility: CLINIC | Age: 46
End: 2021-03-05

## 2021-03-05 LAB
BACTERIA UR CULT: NO GROWTH
BACTERIA UR CULT: NORMAL

## 2021-03-05 NOTE — TELEPHONE ENCOUNTER
Urine culture did not grow bacteria, abx she was started on is good for urinary symptoms. I would recommend to complete course of abx, increase PO fluid intake and if still having symptoms on Monday come by for an appt and we will check mycoplasma and ureaplasma which are bacteria that dont show up on normal urine culture

## 2021-03-05 NOTE — TELEPHONE ENCOUNTER
PATIENT CALLS         REQUESTING A CALL BACK SHE WAS ASKED TO CALL BACK AND LET DR ZURITA KNOW IF SHE IS STILL HAVING SYMPTOMS   BURNING AND URGENCY TO URINATE     STATES SHE HAS HAD FIVE DOSES OF THE ANTIBIOTIC   AND HAS USED 4 OF THE DYE PILLS AND REQUESTING MORE       GOOD CONTACT NUMBER  [phone] (H)

## 2021-03-08 ENCOUNTER — OFFICE VISIT (OUTPATIENT)
Dept: FAMILY MEDICINE CLINIC | Facility: CLINIC | Age: 46
End: 2021-03-08

## 2021-03-08 VITALS
RESPIRATION RATE: 16 BRPM | OXYGEN SATURATION: 97 % | SYSTOLIC BLOOD PRESSURE: 138 MMHG | WEIGHT: 173 LBS | BODY MASS INDEX: 24.77 KG/M2 | DIASTOLIC BLOOD PRESSURE: 87 MMHG | HEIGHT: 70 IN | HEART RATE: 100 BPM | TEMPERATURE: 96.8 F

## 2021-03-08 DIAGNOSIS — N30.90 CYSTITIS: Primary | ICD-10-CM

## 2021-03-08 PROCEDURE — 99213 OFFICE O/P EST LOW 20 MIN: CPT | Performed by: FAMILY MEDICINE

## 2021-03-08 RX ORDER — PHENAZOPYRIDINE HYDROCHLORIDE 200 MG/1
200 TABLET, FILM COATED ORAL 3 TIMES DAILY PRN
Qty: 6 TABLET | Refills: 0 | Status: SHIPPED | OUTPATIENT
Start: 2021-03-08 | End: 2022-08-22

## 2021-03-08 RX ORDER — DOXYCYCLINE HYCLATE 100 MG/1
100 CAPSULE ORAL 2 TIMES DAILY
Qty: 20 CAPSULE | Refills: 0 | Status: SHIPPED | OUTPATIENT
Start: 2021-03-08 | End: 2021-04-10

## 2021-03-08 NOTE — PROGRESS NOTES
Subjective chief complaint: dysuria   Natalie Lawrence is a 45 y.o. female who presents with complaint of urinary urgency. She reports that she started taking macrobid when she noticed symptoms, didn't get better so came in on 3/3 and started on cipro - has been taking, urine culture showed no growth of bacteria, still having symptoms. She denies any vaginal discharge.     Difficulty Urinating  This is a new problem. The current episode started 1 to 4 weeks ago. The problem occurs constantly. The problem has been unchanged. Associated symptoms include urinary symptoms. Pertinent negatives include no abdominal pain, anorexia, arthralgias, change in bowel habit, chest pain, chills, congestion, coughing, diaphoresis, fatigue, fever, headaches, joint swelling, myalgias, nausea, neck pain, numbness, rash, sore throat, swollen glands, vertigo, visual change, vomiting or weakness. Nothing aggravates the symptoms. She has tried drinking (macrobid, cipro) for the symptoms. The treatment provided no relief.        The following portions of the patient's history were reviewed and updated as appropriate: allergies, current medications, past family history, past medical history, past social history, past surgical history and problem list.        Review of Systems   Constitutional: Negative for chills, diaphoresis, fatigue and fever.   HENT: Negative for congestion and sore throat.    Respiratory: Negative for cough.    Cardiovascular: Negative for chest pain.   Gastrointestinal: Negative for abdominal pain, anorexia, change in bowel habit, nausea and vomiting.   Genitourinary: Positive for difficulty urinating, dysuria and urgency. Negative for flank pain, frequency, genital sores, hematuria, vaginal bleeding, vaginal discharge and vaginal pain.   Musculoskeletal: Negative for arthralgias, joint swelling, myalgias and neck pain.   Skin: Negative for rash.   Neurological: Negative for vertigo, weakness, numbness and headaches.   All  "other systems reviewed and are negative.      Objective   Blood pressure 138/87, pulse 100, temperature 96.8 °F (36 °C), resp. rate 16, height 177.8 cm (70\"), weight 78.5 kg (173 lb), SpO2 97 %, not currently breastfeeding.  Physical Exam  Vitals and nursing note reviewed.   Constitutional:       General: She is not in acute distress.     Appearance: Normal appearance. She is not toxic-appearing.   HENT:      Head: Normocephalic and atraumatic.   Pulmonary:      Effort: Pulmonary effort is normal. No respiratory distress.   Skin:     General: Skin is warm and dry.   Neurological:      Mental Status: She is alert and oriented to person, place, and time. Mental status is at baseline.   Psychiatric:         Mood and Affect: Mood normal.         Behavior: Behavior normal.         Thought Content: Thought content normal.         Judgment: Judgment normal.         Assessment/Plan   Problems Addressed this Visit     None      Visit Diagnoses     Cystitis    -  Primary    Relevant Medications    doxycycline (Vibramycin) 100 MG capsule    phenazopyridine (Pyridium) 200 MG tablet    Other Relevant Orders    Mycoplasma / Ureaplasma Culture - Urine, Urine, Clean Catch    POC Urinalysis Dipstick, Automated    Urine Culture - Urine, Urine, Clean Catch      Diagnoses       Codes Comments    Cystitis    -  Primary ICD-10-CM: N30.90  ICD-9-CM: 595.9         PLAN:     #1 cystitis: recurrent, uncontrolled, will check for mycoplasma and ureaplasma, DC cipro and change to doxy, advised to return if not improving           This document has been electronically signed by Emelyn Ashby MD on March 8, 2021 12:35 CST             "

## 2021-03-10 LAB
BACTERIA UR CULT: NO GROWTH
BACTERIA UR CULT: NORMAL
M HOMINIS SPEC QL CULT: NORMAL
SPECIMEN STATUS: NORMAL
U UREALYTICUM SPEC QL CULT: NORMAL

## 2021-04-05 ENCOUNTER — TELEPHONE (OUTPATIENT)
Dept: FAMILY MEDICINE CLINIC | Facility: CLINIC | Age: 46
End: 2021-04-05

## 2021-04-05 DIAGNOSIS — N30.90 CYSTITIS: Primary | ICD-10-CM

## 2021-04-05 NOTE — TELEPHONE ENCOUNTER
Patient reports that she has seen Dr. Ewing in the past. Patient was seen on 03/03/2021 and 03/0/82021 for cystitis. Please review for referral.

## 2021-04-05 NOTE — TELEPHONE ENCOUNTER
Caller: Natalie Lawrence    Relationship: Self    Best call back number: [phone]     What is the medical concern/diagnosis: UROLOGY (UTI)      What specialty or service is being requested:  UROLOGIST     What is the provider, practice or medical service name: [facility]     What is the office location: [facility]     What is the office phone number: [phone]     Any additional details: PLEASE CALL AND ADVISE. PLEASE SEND IN RECORDS.

## 2021-04-10 ENCOUNTER — APPOINTMENT (OUTPATIENT)
Dept: CT IMAGING | Facility: HOSPITAL | Age: 46
End: 2021-04-10

## 2021-04-10 ENCOUNTER — HOSPITAL ENCOUNTER (EMERGENCY)
Facility: HOSPITAL | Age: 46
Discharge: HOME | End: 2021-04-10
Admitting: EMERGENCY MEDICINE

## 2021-04-10 VITALS
HEART RATE: 104 BPM | HEIGHT: 70 IN | TEMPERATURE: 98.5 F | DIASTOLIC BLOOD PRESSURE: 81 MMHG | RESPIRATION RATE: 20 BRPM | OXYGEN SATURATION: 99 % | SYSTOLIC BLOOD PRESSURE: 123 MMHG | BODY MASS INDEX: 23.77 KG/M2 | WEIGHT: 166 LBS

## 2021-04-10 DIAGNOSIS — N39.0 ACUTE UTI: Primary | ICD-10-CM

## 2021-04-10 LAB
ALBUMIN SERPL-MCNC: 4.2 G/DL (ref 3.5–5.2)
ALBUMIN/GLOB SERPL: 1.4 G/DL
ALP SERPL-CCNC: 53 U/L (ref 39–117)
ALT SERPL W P-5'-P-CCNC: 11 U/L (ref 1–33)
ANION GAP SERPL CALCULATED.3IONS-SCNC: 12 MMOL/L (ref 5–15)
AST SERPL-CCNC: 17 U/L (ref 1–32)
BACTERIA UR QL AUTO: ABNORMAL /HPF
BASOPHILS # BLD AUTO: 0.06 10*3/MM3 (ref 0–0.2)
BASOPHILS NFR BLD AUTO: 0.7 % (ref 0–1.5)
BILIRUB SERPL-MCNC: 0.4 MG/DL (ref 0–1.2)
BILIRUB UR QL STRIP: NEGATIVE
BUN SERPL-MCNC: 17 MG/DL (ref 6–20)
BUN/CREAT SERPL: 22.1 (ref 7–25)
CALCIUM SPEC-SCNC: 8.8 MG/DL (ref 8.6–10.5)
CHLORIDE SERPL-SCNC: 102 MMOL/L (ref 98–107)
CLARITY UR: CLEAR
CO2 SERPL-SCNC: 22 MMOL/L (ref 22–29)
COLOR UR: ABNORMAL
CREAT SERPL-MCNC: 0.77 MG/DL (ref 0.57–1)
DEPRECATED RDW RBC AUTO: 38.8 FL (ref 37–54)
EOSINOPHIL # BLD AUTO: 0.07 10*3/MM3 (ref 0–0.4)
EOSINOPHIL NFR BLD AUTO: 0.9 % (ref 0.3–6.2)
ERYTHROCYTE [DISTWIDTH] IN BLOOD BY AUTOMATED COUNT: 12.1 % (ref 12.3–15.4)
GFR SERPL CREATININE-BSD FRML MDRD: 81 ML/MIN/1.73
GLOBULIN UR ELPH-MCNC: 3.1 GM/DL
GLUCOSE SERPL-MCNC: 118 MG/DL (ref 65–99)
GLUCOSE UR STRIP-MCNC: NEGATIVE MG/DL
HCG SERPL QL: NEGATIVE
HCT VFR BLD AUTO: 36.9 % (ref 34–46.6)
HGB BLD-MCNC: 12.4 G/DL (ref 12–15.9)
HGB UR QL STRIP.AUTO: ABNORMAL
HYALINE CASTS UR QL AUTO: ABNORMAL /LPF
IMM GRANULOCYTES # BLD AUTO: 0.02 10*3/MM3 (ref 0–0.05)
IMM GRANULOCYTES NFR BLD AUTO: 0.2 % (ref 0–0.5)
KETONES UR QL STRIP: NEGATIVE
LEUKOCYTE ESTERASE UR QL STRIP.AUTO: ABNORMAL
LYMPHOCYTES # BLD AUTO: 1.44 10*3/MM3 (ref 0.7–3.1)
LYMPHOCYTES NFR BLD AUTO: 17.6 % (ref 19.6–45.3)
MCH RBC QN AUTO: 29.3 PG (ref 26.6–33)
MCHC RBC AUTO-ENTMCNC: 33.6 G/DL (ref 31.5–35.7)
MCV RBC AUTO: 87.2 FL (ref 79–97)
MONOCYTES # BLD AUTO: 0.69 10*3/MM3 (ref 0.1–0.9)
MONOCYTES NFR BLD AUTO: 8.4 % (ref 5–12)
NEUTROPHILS NFR BLD AUTO: 5.89 10*3/MM3 (ref 1.7–7)
NEUTROPHILS NFR BLD AUTO: 72.2 % (ref 42.7–76)
NITRITE UR QL STRIP: POSITIVE
NRBC BLD AUTO-RTO: 0 /100 WBC (ref 0–0.2)
PH UR STRIP.AUTO: 5.5 [PH] (ref 5–8)
PLATELET # BLD AUTO: 197 10*3/MM3 (ref 140–450)
PMV BLD AUTO: 9.9 FL (ref 6–12)
POTASSIUM SERPL-SCNC: 4.1 MMOL/L (ref 3.5–5.2)
PROT SERPL-MCNC: 7.3 G/DL (ref 6–8.5)
PROT UR QL STRIP: NEGATIVE
RBC # BLD AUTO: 4.23 10*6/MM3 (ref 3.77–5.28)
RBC # UR: ABNORMAL /HPF
REF LAB TEST METHOD: ABNORMAL
SODIUM SERPL-SCNC: 136 MMOL/L (ref 136–145)
SP GR UR STRIP: 1.01 (ref 1–1.03)
SQUAMOUS #/AREA URNS HPF: ABNORMAL /HPF
UROBILINOGEN UR QL STRIP: ABNORMAL
WBC # BLD AUTO: 8.17 10*3/MM3 (ref 3.4–10.8)
WBC UR QL AUTO: ABNORMAL /HPF

## 2021-04-10 PROCEDURE — 85025 COMPLETE CBC W/AUTO DIFF WBC: CPT | Performed by: NURSE PRACTITIONER

## 2021-04-10 PROCEDURE — 74177 CT ABD & PELVIS W/CONTRAST: CPT

## 2021-04-10 PROCEDURE — 96365 THER/PROPH/DIAG IV INF INIT: CPT

## 2021-04-10 PROCEDURE — 25010000002 ONDANSETRON PER 1 MG: Performed by: NURSE PRACTITIONER

## 2021-04-10 PROCEDURE — 25010000003 HYDROMORPHONE 1 MG/ML SOLUTION: Performed by: NURSE PRACTITIONER

## 2021-04-10 PROCEDURE — 25010000002 LORAZEPAM PER 2 MG: Performed by: NURSE PRACTITIONER

## 2021-04-10 PROCEDURE — 84703 CHORIONIC GONADOTROPIN ASSAY: CPT | Performed by: NURSE PRACTITIONER

## 2021-04-10 PROCEDURE — 25010000002 IOPAMIDOL 61 % SOLUTION: Performed by: NURSE PRACTITIONER

## 2021-04-10 PROCEDURE — 25010000002 CEFTRIAXONE PER 250 MG: Performed by: NURSE PRACTITIONER

## 2021-04-10 PROCEDURE — 80053 COMPREHEN METABOLIC PANEL: CPT | Performed by: NURSE PRACTITIONER

## 2021-04-10 PROCEDURE — 99284 EMERGENCY DEPT VISIT MOD MDM: CPT

## 2021-04-10 PROCEDURE — 81001 URINALYSIS AUTO W/SCOPE: CPT | Performed by: NURSE PRACTITIONER

## 2021-04-10 PROCEDURE — 96375 TX/PRO/DX INJ NEW DRUG ADDON: CPT

## 2021-04-10 RX ORDER — ONDANSETRON 2 MG/ML
4 INJECTION INTRAMUSCULAR; INTRAVENOUS ONCE
Status: COMPLETED | OUTPATIENT
Start: 2021-04-10 | End: 2021-04-10

## 2021-04-10 RX ORDER — LORAZEPAM 2 MG/ML
0.5 INJECTION INTRAMUSCULAR ONCE
Status: COMPLETED | OUTPATIENT
Start: 2021-04-10 | End: 2021-04-10

## 2021-04-10 RX ORDER — CEFDINIR 300 MG/1
300 CAPSULE ORAL 2 TIMES DAILY
Qty: 20 CAPSULE | Refills: 0 | Status: SHIPPED | OUTPATIENT
Start: 2021-04-10 | End: 2021-04-20

## 2021-04-10 RX ADMIN — ONDANSETRON HYDROCHLORIDE 4 MG: 2 SOLUTION INTRAMUSCULAR; INTRAVENOUS at 18:37

## 2021-04-10 RX ADMIN — IOPAMIDOL 100 ML: 612 INJECTION, SOLUTION INTRAVENOUS at 19:43

## 2021-04-10 RX ADMIN — HYDROMORPHONE HYDROCHLORIDE 1 MG: 1 INJECTION, SOLUTION INTRAMUSCULAR; INTRAVENOUS; SUBCUTANEOUS at 18:38

## 2021-04-10 RX ADMIN — CEFTRIAXONE SODIUM 1 G: 1 INJECTION, POWDER, FOR SOLUTION INTRAMUSCULAR; INTRAVENOUS at 20:32

## 2021-04-10 RX ADMIN — SODIUM CHLORIDE 1000 ML: 9 INJECTION, SOLUTION INTRAVENOUS at 18:37

## 2021-04-10 RX ADMIN — LORAZEPAM 0.5 MG: 2 INJECTION INTRAMUSCULAR; INTRAVENOUS at 18:50

## 2021-04-10 NOTE — ED PROVIDER NOTES
"Subjective   Patient is a 45-year-old white female presents emergency department presents the emergency department with suprapubic pain and hematuria.  She is also having left lower quadrant pain.  She states she is had a urinary tract infection since February and has been on multiple antibiotics.  She states today she started having increased pain in the left lower quadrant as well as having hematuria.  She states she does have an appointment with urology but has not for the next 2 weeks.  She states she feels that she may \"have something different going on because she is hurting so bad.\"  She has been off antibiotics for the past week.  She denies any nausea or vomiting.  She denies any known fever.      History provided by:  Patient   used: No        Review of Systems   Constitutional: Negative.    HENT: Negative.    Eyes: Negative.    Respiratory: Negative.    Cardiovascular: Negative.    Gastrointestinal:        Patient is a 45-year-old white female presents emergency department presents the emergency department with suprapubic pain and hematuria.  She is also having left lower quadrant pain.  She states she is had a urinary tract infection since February and has been on multiple antibiotics.  She states today she started having increased pain in the left lower quadrant as well as having hematuria.  She states she does have an appointment with urology but has not for the next 2 weeks.  She states she feels that she may \"have something different going on because she is hurting so bad.\"  She has been off antibiotics for the past week.  She denies any nausea or vomiting.  She denies any known fever.     Endocrine: Negative.    Genitourinary: Negative.    Musculoskeletal: Negative.    Skin: Negative.    Allergic/Immunologic: Negative.    Neurological: Negative.    Hematological: Negative.    Psychiatric/Behavioral: Negative.    All other systems reviewed and are negative.      Past Medical " History:   Diagnosis Date   • Anxiety    • Macular degeneration    • Urinary tract infection        Allergies   Allergen Reactions   • Dilaudid [Hydromorphone Hcl] Anxiety   • Percocet [Oxycodone-Acetaminophen] Anxiety   • Lortab [Hydrocodone-Acetaminophen] Itching       Past Surgical History:   Procedure Laterality Date   •  SECTION     • DILATATION AND CURETTAGE     • LYMPH NODE BIOPSY         Family History   Problem Relation Age of Onset   • Heart disease Father    • Diabetes Father    • Hypertension Father    • Prostate cancer Father 60   • No Known Problems Mother    • Stroke Paternal Grandfather    • No Known Problems Son    • Breast cancer Neg Hx    • Ovarian cancer Neg Hx    • Uterine cancer Neg Hx    • Colon cancer Neg Hx    • Melanoma Neg Hx        Social History     Socioeconomic History   • Marital status:      Spouse name: Not on file   • Number of children: Not on file   • Years of education: Not on file   • Highest education level: Not on file   Tobacco Use   • Smoking status: Never Smoker   • Smokeless tobacco: Never Used   Substance and Sexual Activity   • Alcohol use: No   • Drug use: No   • Sexual activity: Yes     Partners: Male     Birth control/protection: Condom       Prior to Admission medications    Medication Sig Start Date End Date Taking? Authorizing Provider   ALPRAZolam (XANAX) 0.25 MG tablet Take 1 tablet by mouth 3 (Three) Times a Day As Needed for Anxiety. 20   Nancy Erwin APRN   cholecalciferol (VITAMIN D3) 1000 UNITS tablet Take 5,000 Units by mouth Daily.    Mildred Choe MD   doxycycline (Vibramycin) 100 MG capsule Take 1 capsule by mouth 2 (Two) Times a Day. 3/8/21   Emelyn Ashby MD   Multiple Vitamins-Minerals (DAILY VITAMIN FORMULA+MINERALS PO) Take  by mouth.    Mildred Choe MD   NON FORMULARY Daily. OTC eye vitamin    Mildred Choe MD   phenazopyridine (Pyridium) 200 MG tablet Take 1 tablet by mouth 3 (Three) Times a  "Day As Needed for Bladder Spasms. 3/8/21   Emelyn Ashby MD       /81 (BP Location: Left arm, Patient Position: Sitting)   Pulse 104   Temp 98.5 °F (36.9 °C) (Oral)   Resp 20   Ht 177.8 cm (70\")   Wt 75.3 kg (166 lb)   LMP 03/30/2021   SpO2 99%   BMI 23.82 kg/m²     Objective   Physical Exam  Vitals and nursing note reviewed.   Constitutional:       Appearance: She is well-developed.      Comments: appears to be in pain   HENT:      Head: Normocephalic and atraumatic.   Eyes:      Conjunctiva/sclera: Conjunctivae normal.      Pupils: Pupils are equal, round, and reactive to light.   Neck:      Thyroid: No thyromegaly.      Trachea: No tracheal deviation.   Cardiovascular:      Rate and Rhythm: Normal rate and regular rhythm.      Heart sounds: Normal heart sounds.   Pulmonary:      Effort: Pulmonary effort is normal. No respiratory distress.      Breath sounds: Normal breath sounds. No wheezing or rales.   Chest:      Chest wall: No tenderness.   Abdominal:      General: Bowel sounds are normal.      Palpations: Abdomen is soft.      Comments: Abdomen is soft, nondistended.  She has moderate tenderness in the left lower quadrant abdomen and left flank area.  There is no guarding or rebound   Musculoskeletal:         General: Normal range of motion.      Cervical back: Normal range of motion and neck supple.   Skin:     General: Skin is warm and dry.   Neurological:      Mental Status: She is alert and oriented to person, place, and time.      Cranial Nerves: No cranial nerve deficit.      Deep Tendon Reflexes: Reflexes are normal and symmetric.   Psychiatric:         Behavior: Behavior normal.         Thought Content: Thought content normal.         Judgment: Judgment normal.         Procedures         Lab Results (last 24 hours)     Procedure Component Value Units Date/Time    CBC & Differential [[id number]]  (Abnormal) Collected: 04/10/21 1836    Specimen: Blood Updated: 04/10/21 1852    " Narrative:      The following orders were created for panel order CBC & Differential.  Procedure                               Abnormality         Status                     ---------                               -----------         ------                     CBC Auto Differential[[id number]]        Abnormal            Final result                 Please view results for these tests on the individual orders.    Comprehensive Metabolic Panel [[id number]]  (Abnormal) Collected: 04/10/21 1836    Specimen: Blood Updated: 04/10/21 1910     Glucose 118 mg/dL      BUN 17 mg/dL      Creatinine 0.77 mg/dL      Sodium 136 mmol/L      Potassium 4.1 mmol/L      Comment: Slight hemolysis detected by analyzer. Results may be affected.        Chloride 102 mmol/L      CO2 22.0 mmol/L      Calcium 8.8 mg/dL      Total Protein 7.3 g/dL      Albumin 4.20 g/dL      ALT (SGPT) 11 U/L      AST (SGOT) 17 U/L      Comment: Slight hemolysis detected by analyzer. Results may be affected.        Alkaline Phosphatase 53 U/L      Total Bilirubin 0.4 mg/dL      eGFR Non African Amer 81 mL/min/1.73      Globulin 3.1 gm/dL      A/G Ratio 1.4 g/dL      BUN/Creatinine Ratio 22.1     Anion Gap 12.0 mmol/L     Narrative:      GFR Normal >60  Chronic Kidney Disease <60  Kidney Failure <15      hCG, Serum, Qualitative [[id number]]  (Normal) Collected: 04/10/21 1836    Specimen: Blood Updated: 04/10/21 1903     HCG Qualitative Negative    CBC Auto Differential [[id number]]  (Abnormal) Collected: 04/10/21 1836    Specimen: Blood Updated: 04/10/21 1852     WBC 8.17 10*3/mm3      RBC 4.23 10*6/mm3      Hemoglobin 12.4 g/dL      Hematocrit 36.9 %      MCV 87.2 fL      MCH 29.3 pg      MCHC 33.6 g/dL      RDW 12.1 %      RDW-SD 38.8 fl      MPV 9.9 fL      Platelets 197 10*3/mm3      Neutrophil % 72.2 %      Lymphocyte % 17.6 %      Monocyte % 8.4 %      Eosinophil % 0.9 %      Basophil % 0.7 %      Immature Grans % 0.2 %      Neutrophils, Absolute 5.89 10*3/mm3       Lymphocytes, Absolute 1.44 10*3/mm3      Monocytes, Absolute 0.69 10*3/mm3      Eosinophils, Absolute 0.07 10*3/mm3      Basophils, Absolute 0.06 10*3/mm3      Immature Grans, Absolute 0.02 10*3/mm3      nRBC 0.0 /100 WBC     Urinalysis With Culture If Indicated - Urine, Clean Catch [[id number]]  (Abnormal) Collected: 04/10/21 1939    Specimen: Urine, Clean Catch Updated: 04/10/21 2005     Color, UA Dark Yellow     Appearance, UA Clear     pH, UA 5.5     Specific Gravity, UA 1.008     Glucose, UA Negative     Ketones, UA Negative     Bilirubin, UA Negative     Blood, UA Large (3+)     Protein, UA Negative     Leuk Esterase, UA Trace     Nitrite, UA Positive     Urobilinogen, UA 1.0 E.U./dL    Urinalysis, Microscopic Only - Urine, Clean Catch [[id number]]  (Abnormal) Collected: 04/10/21 1939    Specimen: Urine, Clean Catch Updated: 04/10/21 2005     RBC, UA 3-5 /HPF      WBC, UA 0-2 /HPF      Bacteria, UA 1+ /HPF      Squamous Epithelial Cells, UA 3-6 /HPF      Hyaline Casts, UA 3-6 /LPF      Methodology Manual Light Microscopy          CT Abdomen Pelvis With Contrast   Final Result   1. No acute intra-abdominal finding.   2. Incidental duplicated left renal collecting system. Ureters difficult   to discretely characterize due to a paucity of intra-abdominal fat.   3. Small free fluid in the pelvis.       This report was finalized on 04/10/2021 19:57 by Dr Barb Tolentino MD.          ED Course  ED Course as of Apr 11 0752   Sat Apr 10, 2021   1847 Patient became very anxious after receiving Dilaudid heart rate increased to 125.  Will give Ativan 0.5 mg IV at this time for anxiety    [CW]   2005 Pending urinalysis and ct scan of abd/pelvis at this time. Reviewed pt and pt care plan with Shelly DOMINIQUE. Care of pt transferred at this time     [CW]   2006 Reviewed results of testing with patient.  Advised there was no acute findings and her CAT scan.  Advised she does have a urinary tract infection.  Patient does  have an appointment with urology in 2 weeks.  Advised that she may need to follow-up with him before then.  We will give a dose of Rocephin here in the emergency department.  Will start on Omnicef as well.  Advised the patient to follow-up with urology as scheduled.  Patient be discharged shortly in stable condition    [CW]      ED Course User Index  [CW] Jenni Sullivan, MAIA          MDM  Number of Diagnoses or Management Options  Acute UTI: minor     Amount and/or Complexity of Data Reviewed  Clinical lab tests: ordered and reviewed  Tests in the radiology section of CPT®: ordered and reviewed    Patient Progress  Patient progress: stable      Final diagnoses:   Acute UTI          Jenni Sullivan, MAIA  04/11/21 0752

## 2021-04-13 ENCOUNTER — TELEPHONE (OUTPATIENT)
Dept: OBSTETRICS AND GYNECOLOGY | Facility: CLINIC | Age: 46
End: 2021-04-13

## 2021-04-13 NOTE — TELEPHONE ENCOUNTER
Pt called stating she started period 4/10. Per Tqmmy's notes, she wants an U/S on pt immediately after pt has period. Pt will call when period tappers off so we can sched.

## 2021-04-27 ENCOUNTER — OFFICE VISIT (OUTPATIENT)
Dept: UROLOGY | Facility: CLINIC | Age: 46
End: 2021-04-27

## 2021-04-27 VITALS — BODY MASS INDEX: 24.11 KG/M2 | TEMPERATURE: 97.6 F | WEIGHT: 168.4 LBS | HEIGHT: 70 IN

## 2021-04-27 DIAGNOSIS — R35.0 FREQUENCY OF MICTURITION: ICD-10-CM

## 2021-04-27 DIAGNOSIS — R31.0 GROSS HEMATURIA: ICD-10-CM

## 2021-04-27 DIAGNOSIS — N30.90 CYSTITIS: Primary | ICD-10-CM

## 2021-04-27 DIAGNOSIS — R30.0 DYSURIA: ICD-10-CM

## 2021-04-27 LAB
BILIRUB BLD-MCNC: NEGATIVE MG/DL
CLARITY, POC: CLEAR
COLOR UR: YELLOW
GLUCOSE UR STRIP-MCNC: NEGATIVE MG/DL
KETONES UR QL: NEGATIVE
LEUKOCYTE EST, POC: NEGATIVE
NITRITE UR-MCNC: NEGATIVE MG/ML
PH UR: 5 [PH] (ref 5–8)
PROT UR STRIP-MCNC: NEGATIVE MG/DL
RBC # UR STRIP: ABNORMAL /UL
SP GR UR: 1.01 (ref 1–1.03)
UROBILINOGEN UR QL: NORMAL

## 2021-04-27 PROCEDURE — 51798 US URINE CAPACITY MEASURE: CPT | Performed by: UROLOGY

## 2021-04-27 PROCEDURE — 88112 CYTOPATH CELL ENHANCE TECH: CPT | Performed by: UROLOGY

## 2021-04-27 PROCEDURE — 99204 OFFICE O/P NEW MOD 45 MIN: CPT | Performed by: UROLOGY

## 2021-04-27 PROCEDURE — 81003 URINALYSIS AUTO W/O SCOPE: CPT | Performed by: UROLOGY

## 2021-04-27 NOTE — PROGRESS NOTES
Subjective    Ms. Lawrence is 45 y.o. female    Chief Complaint: dysuria, frequency    History of Present Illness     Several months of severe dysuria, urgency and frequency. Several rounds of antibiotics without improvement. Negative urine cultures.    8 years without symptoms or improved with macrobid but had these symptoms when younger.    Had gross hematuria in last 2 months.    The following portions of the patient's history were reviewed and updated as appropriate: allergies, current medications, past family history, past medical history, past social history, past surgical history and problem list.    Review of Systems   Constitutional: Negative.    HENT: Negative.    Eyes: Negative.    Respiratory: Negative.    Cardiovascular: Negative.    Gastrointestinal: Negative.    Endocrine: Negative.    Musculoskeletal: Negative.    Skin: Negative.    Hematological: Negative.    Psychiatric/Behavioral: Negative.          Current Outpatient Medications:   •  cholecalciferol (VITAMIN D3) 1000 UNITS tablet, Take 5,000 Units by mouth Daily., Disp: , Rfl:   •  Multiple Vitamins-Minerals (DAILY VITAMIN FORMULA+MINERALS PO), Take  by mouth., Disp: , Rfl:   •  NON FORMULARY, Daily. OTC eye vitamin, Disp: , Rfl:   •  Hdvclezcapkt-Fgdoqpi-E-Probiot (AZO URINARY TRACT SUPPORT PO), Take  by mouth., Disp: , Rfl:   •  phenazopyridine (Pyridium) 200 MG tablet, Take 1 tablet by mouth 3 (Three) Times a Day As Needed for Bladder Spasms., Disp: 6 tablet, Rfl: 0    Past Medical History:   Diagnosis Date   • Anxiety    • Macular degeneration    • Urinary tract infection        Past Surgical History:   Procedure Laterality Date   •  SECTION     • DILATATION AND CURETTAGE     • LYMPH NODE BIOPSY         Social History     Socioeconomic History   • Marital status:      Spouse name: Not on file   • Number of children: Not on file   • Years of education: Not on file   • Highest education level: Not on file   Tobacco Use   • Smoking  "status: Never Smoker   • Smokeless tobacco: Never Used   Vaping Use   • Vaping Use: Never used   Substance and Sexual Activity   • Alcohol use: No   • Drug use: No   • Sexual activity: Yes     Partners: Male     Birth control/protection: Condom       Family History   Problem Relation Age of Onset   • Heart disease Father    • Diabetes Father    • Hypertension Father    • Prostate cancer Father 60   • No Known Problems Mother    • Stroke Paternal Grandfather    • No Known Problems Son    • Breast cancer Neg Hx    • Ovarian cancer Neg Hx    • Uterine cancer Neg Hx    • Colon cancer Neg Hx    • Melanoma Neg Hx        Objective    Temp 97.6 °F (36.4 °C)   Ht 177.8 cm (70\")   Wt 76.4 kg (168 lb 6.4 oz)   LMP 03/30/2021   BMI 24.16 kg/m²     Physical Exam  Constitutional:       Appearance: Normal appearance. She is well-developed.   HENT:      Head: Normocephalic and atraumatic.   Eyes:      Conjunctiva/sclera: Conjunctivae normal.   Cardiovascular:      Rate and Rhythm: Normal rate and regular rhythm.   Pulmonary:      Effort: Pulmonary effort is normal.   Abdominal:      Palpations: Abdomen is soft.   Musculoskeletal:         General: Normal range of motion.      Cervical back: Normal range of motion and neck supple.   Skin:     General: Skin is warm and dry.   Neurological:      Mental Status: She is alert and oriented to person, place, and time.   Psychiatric:         Behavior: Behavior normal.             Results for orders placed or performed in visit on 04/27/21   POC Urinalysis Dipstick, Multipro    Specimen: Urine   Result Value Ref Range    Color Yellow Yellow, Straw, Dark Yellow, Marge    Clarity, UA Clear Clear    Glucose, UA Negative Negative, 1000 mg/dL (3+) mg/dL    Bilirubin Negative Negative    Ketones, UA Negative Negative    Specific Gravity  1.015 1.005 - 1.030    Blood, UA 3+ (A) Negative    pH, Urine 5.0 5.0 - 8.0    Protein, POC Negative Negative mg/dL    Urobilinogen, UA Normal Normal    " Nitrite, UA Negative Negative    Leukocytes Negative Negative     Bladder Scan interpretation  Estimation of residual urine via abdominal ultrasound  Residual Urine: 34ml  Indication: Cystitis  Position: Supine  Examination: Incremental scanning of the suprapubic area using 3 MHz transducer using copious amounts of acoustic gel.   Findings: An anechoic area was demonstrated which represented the bladder, with measurement of residual urine as noted. I inspected this myself. In that the residual urine was stable or insignificant, no treatment will be necessary at this time.       Assessment and Plan    Diagnoses and all orders for this visit:    1. Cystitis (Primary)  -     POC Urinalysis Dipstick, Multipro  -     Cystoscopy; Future    2. Gross hematuria  -     Cystoscopy; Future    3. Frequency of micturition  -     Cystoscopy; Future    4. Dysuria  -     Cystoscopy; Future    Office cysto in 2 weeks with pelvic exam.    Start metamucil capsules and increase water intake.    Voided urine for cytology. CT images, although no delays, are not suggesting of upper tract tumor.

## 2021-04-29 ENCOUNTER — OFFICE VISIT (OUTPATIENT)
Dept: OBSTETRICS AND GYNECOLOGY | Facility: CLINIC | Age: 46
End: 2021-04-29

## 2021-04-29 VITALS
BODY MASS INDEX: 23.77 KG/M2 | SYSTOLIC BLOOD PRESSURE: 152 MMHG | DIASTOLIC BLOOD PRESSURE: 90 MMHG | HEIGHT: 70 IN | WEIGHT: 166 LBS

## 2021-04-29 DIAGNOSIS — B37.9 CANDIDIASIS: ICD-10-CM

## 2021-04-29 DIAGNOSIS — R35.0 URINARY FREQUENCY: ICD-10-CM

## 2021-04-29 DIAGNOSIS — F41.9 ANXIETY: ICD-10-CM

## 2021-04-29 DIAGNOSIS — N93.8 DUB (DYSFUNCTIONAL UTERINE BLEEDING): Primary | ICD-10-CM

## 2021-04-29 LAB
CYTO UR: NORMAL
LAB AP CASE REPORT: NORMAL
PATH REPORT.FINAL DX SPEC: NORMAL
PATH REPORT.GROSS SPEC: NORMAL

## 2021-04-29 PROCEDURE — 99213 OFFICE O/P EST LOW 20 MIN: CPT | Performed by: NURSE PRACTITIONER

## 2021-04-29 RX ORDER — MEDROXYPROGESTERONE ACETATE 10 MG/1
10 TABLET ORAL DAILY
Qty: 10 TABLET | Refills: 0 | Status: SHIPPED | OUTPATIENT
Start: 2021-04-29 | End: 2021-05-09

## 2021-04-29 RX ORDER — ALPRAZOLAM 0.25 MG/1
0.25 TABLET ORAL 3 TIMES DAILY PRN
Qty: 90 TABLET | Refills: 0 | Status: SHIPPED | OUTPATIENT
Start: 2021-04-29 | End: 2022-08-22 | Stop reason: SDUPTHER

## 2021-04-29 RX ORDER — FLUCONAZOLE 150 MG/1
150 TABLET ORAL ONCE
Qty: 2 TABLET | Refills: 0 | Status: SHIPPED | OUTPATIENT
Start: 2021-04-29 | End: 2021-04-29

## 2021-04-29 NOTE — PATIENT INSTRUCTIONS
Interstitial Cystitis    Interstitial cystitis is inflammation of the bladder. This may cause pain in the bladder area as well as a frequent and urgent need to urinate. The bladder is a hollow organ in the lower part of the abdomen. It stores urine after the urine is made in the kidneys.  The severity of interstitial cystitis can vary from person to person. You may have flare-ups, and then your symptoms may go away for a while. For many people, it becomes a long-term (chronic) problem.  What are the causes?  The cause of this condition is not known.  What increases the risk?  The following factors may make you more likely to develop this condition:  · You are female.  · You have fibromyalgia.  · You have irritable bowel syndrome (IBS).  · You have endometriosis.  This condition may be aggravated by:  · Stress.  · Smoking.  · Spicy foods.  What are the signs or symptoms?  Symptoms of interstitial cystitis vary, and they can change over time. Symptoms may include:  · Discomfort or pain in the bladder area, which is in the lower abdomen. Pain can range from mild to severe. The pain may change in intensity as the bladder fills with urine or as it empties.  · Pain in the pelvic area, between the hip bones.  · An urgent need to urinate.  · Frequent urination.  · Pain during urination.  · Pain during sex.  · Blood in the urine.  For women, symptoms often get worse during menstruation.  How is this diagnosed?  This condition is diagnosed based on your symptoms, your medical history, and a physical exam. You may have tests to rule out other conditions, such as:  · Urine tests.  · Cystoscopy. For this test, a tool similar to a very thin telescope is used to look into your bladder.  · Biopsy. This involves taking a sample of tissue from the bladder to be examined under a microscope.  How is this treated?  There is no cure for this condition, but treatment can help you control your symptoms. Work closely with your health care  provider to find the most effective treatments for you. Treatment options may include:  · Medicines to relieve pain and reduce how often you feel the need to urinate.  · Learning ways to control when you urinate (bladder training).  · Lifestyle changes, such as changing your diet or taking steps to control stress.  · Using a device that provides electrical stimulation to your nerves, which can relieve pain (neuromodulation therapy). The device is placed on your back, where it blocks the nerves that cause you to feel pain in your bladder area.  · A procedure that stretches your bladder by filling it with air or fluid.  · Surgery. This is rare. It is only done for extreme cases, if other treatments do not help.  Follow these instructions at home:  Bladder training    · Use bladder training techniques as directed. Techniques may include:  ? Urinating at scheduled times.  ? Training yourself to delay urination.  ? Doing exercises (Kegel exercises) to strengthen the muscles that control urine flow.  · Keep a bladder diary.  ? Write down the times that you urinate and any symptoms that you have. This can help you find out which foods, liquids, or activities make your symptoms worse.  ? Use your bladder diary to schedule bathroom trips. If you are away from home, plan to be near a bathroom at each of your scheduled times.  · Make sure that you urinate just before you leave the house and just before you go to bed.  Eating and drinking  · Make dietary changes as recommended by your health care provider. You may need to avoid:  ? Spicy foods.  ? Foods that contain a lot of potassium.  · Limit your intake of beverages that make you need to urinate. These include:  ? Caffeinated beverages like soda, coffee, and tea.  ? Alcohol.  General instructions  · Take over-the-counter and prescription medicines only as told by your health care provider.  · Do not drink alcohol.  · You can try a warm or cool compress over your bladder for  comfort.  · Avoid wearing tight clothing.  · Do not use any products that contain nicotine or tobacco, such as cigarettes and e-cigarettes. If you need help quitting, ask your health care provider.  · Keep all follow-up visits as told by your health care provider. This is important.  Contact a health care provider if you have:  · Symptoms that do not get better with treatment.  · Pain or discomfort that gets worse.  · More frequent urges to urinate.  · A fever.  Get help right away if:  · You have no control over when you urinate.  Summary  · Interstitial cystitis is inflammation of the bladder.  · This condition may cause pain in the bladder area as well as a frequent and urgent need to urinate.  · You may have flare-ups of the condition, and then it may go away for a while. For many people, it becomes a long-term (chronic) problem.  · There is no cure for interstitial cystitis, but treatment methods are available to control your symptoms.  This information is not intended to replace advice given to you by your health care provider. Make sure you discuss any questions you have with your health care provider.  Document Revised: 11/30/2018 Document Reviewed: 11/12/2018  Let Patient Education © 2021 [employer]

## 2021-04-29 NOTE — PROGRESS NOTES
"Subjective     Natalie Lawrence is a 45 y.o. female    Patient is here today to discuss irregular bleeding.  States she started her period on April 10 and has been bleeding ever since.  She states most days it is just spotting.  She is also having bladder issues and has seen urology and has an appointment for cystoscopy next week.    Vaginal Bleeding  The patient's primary symptoms include vaginal bleeding. The patient's pertinent negatives include no pelvic pain or vaginal discharge. This is a recurrent problem. The current episode started 1 to 4 weeks ago. The problem occurs intermittently. The problem has been waxing and waning. The patient is experiencing no pain. She is not pregnant. Associated symptoms include dysuria, frequency, hematuria and urgency. Pertinent negatives include no abdominal pain, anorexia, back pain, chills, constipation, diarrhea, discolored urine, fever, flank pain, headaches, joint pain, joint swelling, nausea, painful intercourse, rash, sore throat or vomiting. The vaginal discharge was bloody. The vaginal bleeding is typical of menses. She has not been passing clots. She has not been passing tissue. Nothing aggravates the symptoms. She has tried nothing for the symptoms. She is sexually active. Her menstrual history has been irregular.         /90   Ht 177.8 cm (70\")   Wt 75.3 kg (166 lb)   LMP 04/10/2021 (Exact Date)   Breastfeeding No   BMI 23.82 kg/m²     Outpatient Encounter Medications as of 4/29/2021   Medication Sig Dispense Refill   • cholecalciferol (VITAMIN D3) 1000 UNITS tablet Take 5,000 Units by mouth Daily.     • Multiple Vitamins-Minerals (DAILY VITAMIN FORMULA+MINERALS PO) Take  by mouth.     • NON FORMULARY Daily. OTC eye vitamin     • Dtlqiajjxrpa-Rtshzpx-Q-Probiot (AZO URINARY TRACT SUPPORT PO) Take  by mouth.     • phenazopyridine (Pyridium) 200 MG tablet Take 1 tablet by mouth 3 (Three) Times a Day As Needed for Bladder Spasms. 6 tablet 0   • ALPRAZolam " (XANAX) 0.25 MG tablet Take 1 tablet by mouth 3 (Three) Times a Day As Needed for Anxiety. 90 tablet 0   • fluconazole (Diflucan) 150 MG tablet Take 1 tablet by mouth 1 (One) Time for 1 dose. Take once a week for 2 weeks. 2 tablet 0   • medroxyPROGESTERone (Provera) 10 MG tablet Take 1 tablet by mouth Daily for 10 days. 10 tablet 0     No facility-administered encounter medications on file as of 2021.       Surgical History  Past Surgical History:   Procedure Laterality Date   •  SECTION     • DILATATION AND CURETTAGE     • LYMPH NODE BIOPSY         Family History  Family History   Problem Relation Age of Onset   • Heart disease Father    • Diabetes Father    • Hypertension Father    • Prostate cancer Father 60   • No Known Problems Mother    • Stroke Paternal Grandfather    • No Known Problems Son    • Breast cancer Neg Hx    • Ovarian cancer Neg Hx    • Uterine cancer Neg Hx    • Colon cancer Neg Hx    • Melanoma Neg Hx        The following portions of the patient's history were reviewed and updated as appropriate: allergies, current medications, past family history, past medical history, past social history, past surgical history and problem list.    Review of Systems   Constitutional: Negative for activity change, appetite change, chills, diaphoresis, fatigue, fever, unexpected weight gain and unexpected weight loss.   HENT: Negative for congestion, dental problem, drooling, ear discharge, ear pain, facial swelling, hearing loss, mouth sores, nosebleeds, postnasal drip, rhinorrhea, sinus pressure, sneezing, sore throat, swollen glands, tinnitus, trouble swallowing and voice change.    Eyes: Negative for blurred vision, double vision, photophobia, pain, discharge, redness, itching and visual disturbance.   Respiratory: Negative for apnea, cough, choking, chest tightness, shortness of breath, wheezing and stridor.    Cardiovascular: Negative for chest pain, palpitations and leg swelling.    Gastrointestinal: Negative for abdominal distention, abdominal pain, anal bleeding, anorexia, blood in stool, constipation, diarrhea, nausea, rectal pain, vomiting, GERD and indigestion.   Endocrine: Negative for cold intolerance, heat intolerance, polydipsia, polyphagia and polyuria.   Genitourinary: Positive for dysuria, frequency, hematuria, menstrual problem, urgency and vaginal bleeding. Negative for amenorrhea, breast discharge, breast lump, breast pain, decreased libido, decreased urine volume, difficulty urinating, dyspareunia, flank pain, genital sores, pelvic pain, pelvic pressure, urinary incontinence, vaginal discharge and vaginal pain.   Musculoskeletal: Negative for arthralgias, back pain, gait problem, joint pain, joint swelling, myalgias, neck pain, neck stiffness and bursitis.   Skin: Negative for color change, dry skin and rash.   Allergic/Immunologic: Negative for environmental allergies, food allergies and immunocompromised state.   Neurological: Negative for dizziness, tremors, seizures, syncope, facial asymmetry, speech difficulty, weakness, light-headedness, numbness, headache, memory problem and confusion.   Hematological: Negative for adenopathy. Does not bruise/bleed easily.   Psychiatric/Behavioral: Negative for agitation, behavioral problems, decreased concentration, dysphoric mood, hallucinations, self-injury, sleep disturbance, suicidal ideas, negative for hyperactivity, depressed mood and stress. The patient is nervous/anxious.        Objective   Physical Exam  Vitals and nursing note reviewed.   Constitutional:       Appearance: She is well-developed.   HENT:      Head: Normocephalic and atraumatic.   Eyes:      General:         Right eye: No discharge.         Left eye: No discharge.      Conjunctiva/sclera: Conjunctivae normal.   Neck:      Thyroid: No thyromegaly.   Cardiovascular:      Rate and Rhythm: Normal rate and regular rhythm.      Heart sounds: Normal heart sounds.    Pulmonary:      Effort: Pulmonary effort is normal.      Breath sounds: Normal breath sounds.   Musculoskeletal:      Cervical back: Normal range of motion and neck supple.   Skin:     General: Skin is warm and dry.   Neurological:      Mental Status: She is alert and oriented to person, place, and time.   Psychiatric:         Mood and Affect: Mood normal.         Behavior: Behavior normal.         Thought Content: Thought content normal.         Judgment: Judgment normal.         Assessment/Plan   Diagnoses and all orders for this visit:    1. DUB (dysfunctional uterine bleeding) (Primary)  Comments:  Patient has been on her period since April 10.  Her ultrasound today was essentially normal except for small amount of fluid in the right side of her uterus.  She does have a bicornuate uterus.  Her ovaries looked normal.  Endometrium was 7 mm.  She will try Provera for 10 days and return here in a month for further follow-up.  Orders:  -     medroxyPROGESTERone (Provera) 10 MG tablet; Take 1 tablet by mouth Daily for 10 days.  Dispense: 10 tablet; Refill: 0    2. Anxiety  Comments:  Patient has had quite a bit of anxiety due to her bladder issues.  She is given a refill on Xanax today.  Natanael is reviewed.  Orders:  -     ALPRAZolam (XANAX) 0.25 MG tablet; Take 1 tablet by mouth 3 (Three) Times a Day As Needed for Anxiety.  Dispense: 90 tablet; Refill: 0    3. Urinary frequency  Comments:  Patient is having urinary urgency, pain, and hematuria.  She is having a cystoscopy next week.  We discussed interstitial cystitis.  She is given a handout on IC.    4. Candidiasis  Comments:  Patient has been on approximately 6 antibiotics for UTI.  She is now seen urology and they have a cystoscopy scheduled for next week.  She is given Diflucan today.  She is also urged to be on probiotics.  Orders:  -     fluconazole (Diflucan) 150 MG tablet; Take 1 tablet by mouth 1 (One) Time for 1 dose. Take once a week for 2 weeks.   Dispense: 2 tablet; Refill: 0         Patient's Body mass index is 23.82 kg/m². BMI is within normal parameters. No follow-up required..      Nancy Erwin, APRN  4/29/2021

## 2021-05-21 ENCOUNTER — PROCEDURE VISIT (OUTPATIENT)
Dept: UROLOGY | Facility: CLINIC | Age: 46
End: 2021-05-21

## 2021-05-21 DIAGNOSIS — N30.90 CYSTITIS: Primary | ICD-10-CM

## 2021-05-21 DIAGNOSIS — R35.0 FREQUENCY OF MICTURITION: ICD-10-CM

## 2021-05-21 DIAGNOSIS — R30.0 DYSURIA: ICD-10-CM

## 2021-05-21 LAB
BILIRUB BLD-MCNC: NEGATIVE MG/DL
CLARITY, POC: CLEAR
COLOR UR: YELLOW
GLUCOSE UR STRIP-MCNC: NEGATIVE MG/DL
KETONES UR QL: NEGATIVE
LEUKOCYTE EST, POC: NEGATIVE
NITRITE UR-MCNC: NEGATIVE MG/ML
PH UR: 5.5 [PH] (ref 5–8)
PROT UR STRIP-MCNC: NEGATIVE MG/DL
RBC # UR STRIP: ABNORMAL /UL
SP GR UR: 1.02 (ref 1–1.03)
UROBILINOGEN UR QL: NORMAL

## 2021-05-21 PROCEDURE — 52000 CYSTOURETHROSCOPY: CPT | Performed by: UROLOGY

## 2021-05-21 PROCEDURE — 81003 URINALYSIS AUTO W/O SCOPE: CPT | Performed by: UROLOGY

## 2021-05-21 NOTE — PROGRESS NOTES
The patient presents for office cystoscopy today.  Under routine sterile conditions, supple cystoscopy with retroflexion was performed.  The patient tolerated the procedure well.  Normal urethra and bladder.  Normal capacity bladder.  No abnormalities of the urothelium.  No tumors.    Pelvic examination was performed.  Did seem to be some posterior weakness of the vagina.  No significant pelvic organ prolapse noted with Valsalva.    I will see her back in 4 weeks.  We will trial Myrbetriq.  I have given her sample of mirabegron 25 mg daily.

## 2021-06-17 NOTE — PROGRESS NOTES
Subjective    Ms. Lawrence is 46 y.o. female    Chief Complaint: frequency, dysuria    History of Present Illness     Mirabegron didn't help. She has stopped this medication. Reports symptoms are worse with sitting. Better with laying or standing.    Also reports pain with intercourse.    The following portions of the patient's history were reviewed and updated as appropriate: allergies, current medications, past family history, past medical history, past social history, past surgical history and problem list.    Review of Systems   Constitutional: Negative for chills and fever.   Eyes: Negative.    Respiratory: Negative for shortness of breath.    Cardiovascular: Negative for chest pain.   Gastrointestinal: Negative for abdominal pain, anal bleeding, blood in stool, nausea and vomiting.   Endocrine: Negative.    Genitourinary: Negative for difficulty urinating, dysuria, frequency, hematuria and urgency.   Musculoskeletal: Negative.    Skin: Negative.    Allergic/Immunologic: Negative.    Neurological: Negative.    Hematological: Negative.    Psychiatric/Behavioral: Negative.          Current Outpatient Medications:   •  ALPRAZolam (XANAX) 0.25 MG tablet, Take 1 tablet by mouth 3 (Three) Times a Day As Needed for Anxiety., Disp: 90 tablet, Rfl: 0  •  cholecalciferol (VITAMIN D3) 1000 UNITS tablet, Take 5,000 Units by mouth Daily., Disp: , Rfl:   •  Multiple Vitamins-Minerals (DAILY VITAMIN FORMULA+MINERALS PO), Take  by mouth., Disp: , Rfl:   •  NON FORMULARY, Daily. OTC eye vitamin, Disp: , Rfl:   •  Dzlhcnvsofwy-Etmpjbp-P-Probiot (AZO URINARY TRACT SUPPORT PO), Take  by mouth., Disp: , Rfl:   •  phenazopyridine (Pyridium) 200 MG tablet, Take 1 tablet by mouth 3 (Three) Times a Day As Needed for Bladder Spasms., Disp: 6 tablet, Rfl: 0    Past Medical History:   Diagnosis Date   • Anxiety    • Macular degeneration    • Urinary tract infection        Past Surgical History:   Procedure Laterality Date   •   "SECTION     • DILATATION AND CURETTAGE     • LYMPH NODE BIOPSY         Social History     Socioeconomic History   • Marital status:      Spouse name: Not on file   • Number of children: Not on file   • Years of education: Not on file   • Highest education level: Not on file   Tobacco Use   • Smoking status: Never Smoker   • Smokeless tobacco: Never Used   Vaping Use   • Vaping Use: Never used   Substance and Sexual Activity   • Alcohol use: No   • Drug use: No   • Sexual activity: Yes     Partners: Male     Birth control/protection: Condom       Family History   Problem Relation Age of Onset   • Heart disease Father    • Diabetes Father    • Hypertension Father    • Prostate cancer Father 60   • No Known Problems Mother    • Stroke Paternal Grandfather    • No Known Problems Son    • Breast cancer Neg Hx    • Ovarian cancer Neg Hx    • Uterine cancer Neg Hx    • Colon cancer Neg Hx    • Melanoma Neg Hx        Objective    Temp 96.7 °F (35.9 °C)   Ht 177.8 cm (70\")   Wt 71 kg (156 lb 9.6 oz)   BMI 22.47 kg/m²     Physical Exam  Constitutional:       Appearance: Normal appearance.   Neurological:      Mental Status: She is alert.             Results for orders placed or performed in visit on 06/22/21   POC Urinalysis Dipstick, Multipro    Specimen: Urine   Result Value Ref Range    Color Yellow Yellow, Straw, Dark Yellow, Marge    Clarity, UA Clear Clear    Glucose, UA Negative Negative, 1000 mg/dL (3+) mg/dL    Bilirubin Negative Negative    Ketones, UA Negative Negative    Specific Gravity  1.030 1.005 - 1.030    Blood, UA Trace (A) Negative    pH, Urine 5.5 5.0 - 8.0    Protein, POC Negative Negative mg/dL    Urobilinogen, UA Normal Normal    Nitrite, UA Negative Negative    Leukocytes Negative Negative     Assessment and Plan    Diagnoses and all orders for this visit:    1. Cystitis (Primary)  -     POC Urinalysis Dipstick, Multipro  -     Ambulatory Referral to Physical Therapy Evaluate and treat    2. " Dysuria  -     Ambulatory Referral to Physical Therapy Evaluate and treat    3. Frequency of micturition  -     Ambulatory Referral to Physical Therapy Evaluate and treat    4. Gross hematuria    Refer for pelvic floor physical therapy for frequency, dysuria.    She will discuss these symptoms in greater detail with GYN    FU in 6 weeks.

## 2021-06-22 ENCOUNTER — OFFICE VISIT (OUTPATIENT)
Dept: UROLOGY | Facility: CLINIC | Age: 46
End: 2021-06-22

## 2021-06-22 VITALS — TEMPERATURE: 96.7 F | HEIGHT: 70 IN | BODY MASS INDEX: 22.42 KG/M2 | WEIGHT: 156.6 LBS

## 2021-06-22 DIAGNOSIS — N30.90 CYSTITIS: Primary | ICD-10-CM

## 2021-06-22 DIAGNOSIS — R31.0 GROSS HEMATURIA: ICD-10-CM

## 2021-06-22 DIAGNOSIS — R30.0 DYSURIA: ICD-10-CM

## 2021-06-22 DIAGNOSIS — R35.0 FREQUENCY OF MICTURITION: ICD-10-CM

## 2021-06-22 LAB
BILIRUB BLD-MCNC: NEGATIVE MG/DL
CLARITY, POC: CLEAR
COLOR UR: YELLOW
GLUCOSE UR STRIP-MCNC: NEGATIVE MG/DL
KETONES UR QL: NEGATIVE
LEUKOCYTE EST, POC: NEGATIVE
NITRITE UR-MCNC: NEGATIVE MG/ML
PH UR: 5.5 [PH] (ref 5–8)
PROT UR STRIP-MCNC: NEGATIVE MG/DL
RBC # UR STRIP: ABNORMAL /UL
SP GR UR: 1.03 (ref 1–1.03)
UROBILINOGEN UR QL: NORMAL

## 2021-06-22 PROCEDURE — 81003 URINALYSIS AUTO W/O SCOPE: CPT | Performed by: UROLOGY

## 2021-06-22 PROCEDURE — 99213 OFFICE O/P EST LOW 20 MIN: CPT | Performed by: UROLOGY

## 2021-06-29 ENCOUNTER — OFFICE VISIT (OUTPATIENT)
Dept: OBSTETRICS AND GYNECOLOGY | Facility: CLINIC | Age: 46
End: 2021-06-29

## 2021-06-29 VITALS
BODY MASS INDEX: 24.48 KG/M2 | WEIGHT: 171 LBS | DIASTOLIC BLOOD PRESSURE: 94 MMHG | HEIGHT: 70 IN | SYSTOLIC BLOOD PRESSURE: 150 MMHG

## 2021-06-29 DIAGNOSIS — Z12.31 ENCOUNTER FOR SCREENING MAMMOGRAM FOR BREAST CANCER: ICD-10-CM

## 2021-06-29 DIAGNOSIS — Z01.419 WELL WOMAN EXAM WITH ROUTINE GYNECOLOGICAL EXAM: Primary | ICD-10-CM

## 2021-06-29 DIAGNOSIS — N93.8 DUB (DYSFUNCTIONAL UTERINE BLEEDING): ICD-10-CM

## 2021-06-29 DIAGNOSIS — N30.10 INTERSTITIAL CYSTITIS: ICD-10-CM

## 2021-06-29 DIAGNOSIS — R35.0 URINARY FREQUENCY: ICD-10-CM

## 2021-06-29 DIAGNOSIS — E55.9 VITAMIN D DEFICIENCY: ICD-10-CM

## 2021-06-29 PROCEDURE — 87624 HPV HI-RISK TYP POOLED RSLT: CPT | Performed by: NURSE PRACTITIONER

## 2021-06-29 PROCEDURE — 99213 OFFICE O/P EST LOW 20 MIN: CPT | Performed by: NURSE PRACTITIONER

## 2021-06-29 PROCEDURE — G0123 SCREEN CERV/VAG THIN LAYER: HCPCS | Performed by: NURSE PRACTITIONER

## 2021-06-29 PROCEDURE — 99396 PREV VISIT EST AGE 40-64: CPT | Performed by: NURSE PRACTITIONER

## 2021-06-29 RX ORDER — PENTOSAN POLYSULFATE SODIUM 100 MG/1
100 CAPSULE, GELATIN COATED ORAL 4 TIMES DAILY
Qty: 120 CAPSULE | Refills: 3 | Status: SHIPPED | OUTPATIENT
Start: 2021-06-29 | End: 2022-03-18 | Stop reason: SDDI

## 2021-06-29 RX ORDER — MEDROXYPROGESTERONE ACETATE 10 MG/1
10 TABLET ORAL DAILY
Qty: 30 TABLET | Refills: 3 | Status: SHIPPED | OUTPATIENT
Start: 2021-06-29 | End: 2021-07-09

## 2021-06-29 NOTE — PROGRESS NOTES
"Subjective     Natalie Lawrence is a 46 y.o. female    Patient is here today for yearly checkup.  She is recently seen urology and had a cystoscopy that was normal.  She continues to have urinary frequency.  She tried Myrbetriq with no improvement.  She feels it is related to somewhat her diet.  Her periods are still slightly irregular.    Gynecologic Exam  The patient's pertinent negatives include no pelvic pain, vaginal bleeding or vaginal discharge. The patient is experiencing no pain. Associated symptoms include frequency. Pertinent negatives include no abdominal pain, anorexia, back pain, chills, constipation, diarrhea, discolored urine, dysuria, fever, flank pain, headaches, hematuria, joint pain, joint swelling, nausea, painful intercourse, rash, sore throat, urgency or vomiting. She is sexually active. She uses condoms for contraception. Her menstrual history has been irregular.         /94   Ht 177.8 cm (70\")   Wt 77.6 kg (171 lb)   LMP 06/16/2021 (Exact Date)   Breastfeeding No   BMI 24.54 kg/m²     Outpatient Encounter Medications as of 6/29/2021   Medication Sig Dispense Refill   • ALPRAZolam (XANAX) 0.25 MG tablet Take 1 tablet by mouth 3 (Three) Times a Day As Needed for Anxiety. 90 tablet 0   • cholecalciferol (VITAMIN D3) 1000 UNITS tablet Take 5,000 Units by mouth Daily.     • Multiple Vitamins-Minerals (DAILY VITAMIN FORMULA+MINERALS PO) Take  by mouth.     • NON FORMULARY Daily. OTC eye vitamin     • Liuifilifvve-Nkurbpm-Y-Probiot (AZO URINARY TRACT SUPPORT PO) Take  by mouth.     • phenazopyridine (Pyridium) 200 MG tablet Take 1 tablet by mouth 3 (Three) Times a Day As Needed for Bladder Spasms. 6 tablet 0   • medroxyPROGESTERone (Provera) 10 MG tablet Take 1 tablet by mouth Daily for 10 days. Take day 16-25 every month 30 tablet 3   • pentosan polysulfate (Elmiron) 100 MG capsule Take 1 capsule by mouth 4 (Four) Times a Day. 120 capsule 3     No facility-administered encounter " medications on file as of 2021.       Surgical History  Past Surgical History:   Procedure Laterality Date   •  SECTION     • DILATATION AND CURETTAGE     • LYMPH NODE BIOPSY         Family History  Family History   Problem Relation Age of Onset   • Heart disease Father    • Diabetes Father    • Hypertension Father    • Prostate cancer Father 60   • No Known Problems Mother    • Stroke Paternal Grandfather    • No Known Problems Son    • Breast cancer Neg Hx    • Ovarian cancer Neg Hx    • Uterine cancer Neg Hx    • Colon cancer Neg Hx    • Melanoma Neg Hx        The following portions of the patient's history were reviewed and updated as appropriate: allergies, current medications, past family history, past medical history, past social history, past surgical history and problem list.    Review of Systems   Constitutional: Negative for activity change, appetite change, chills, diaphoresis, fatigue, fever, unexpected weight gain and unexpected weight loss.   HENT: Negative for congestion, dental problem, drooling, ear discharge, ear pain, facial swelling, hearing loss, mouth sores, nosebleeds, postnasal drip, rhinorrhea, sinus pressure, sneezing, sore throat, swollen glands, tinnitus, trouble swallowing and voice change.    Eyes: Negative for blurred vision, double vision, photophobia, pain, discharge, redness, itching and visual disturbance.   Respiratory: Negative for apnea, cough, choking, chest tightness, shortness of breath, wheezing and stridor.    Cardiovascular: Negative for chest pain, palpitations and leg swelling.   Gastrointestinal: Negative for abdominal distention, abdominal pain, anal bleeding, anorexia, blood in stool, constipation, diarrhea, nausea, rectal pain, vomiting, GERD and indigestion.   Endocrine: Negative for cold intolerance, heat intolerance, polydipsia, polyphagia and polyuria.   Genitourinary: Positive for frequency and menstrual problem. Negative for amenorrhea, breast  discharge, breast lump, breast pain, decreased libido, decreased urine volume, difficulty urinating, dyspareunia, dysuria, flank pain, genital sores, hematuria, pelvic pain, pelvic pressure, urgency, urinary incontinence, vaginal bleeding, vaginal discharge and vaginal pain.   Musculoskeletal: Negative for arthralgias, back pain, gait problem, joint pain, joint swelling, myalgias, neck pain, neck stiffness and bursitis.   Skin: Negative for color change, dry skin and rash.   Allergic/Immunologic: Negative for environmental allergies, food allergies and immunocompromised state.   Neurological: Negative for dizziness, tremors, seizures, syncope, facial asymmetry, speech difficulty, weakness, light-headedness, numbness, headache, memory problem and confusion.   Hematological: Negative for adenopathy. Does not bruise/bleed easily.   Psychiatric/Behavioral: Negative for agitation, behavioral problems, decreased concentration, dysphoric mood, hallucinations, self-injury, sleep disturbance, suicidal ideas, negative for hyperactivity, depressed mood and stress. The patient is not nervous/anxious.        Objective   Physical Exam  Vitals and nursing note reviewed. Exam conducted with a chaperone present.   Constitutional:       General: She is not in acute distress.     Appearance: She is well-developed. She is not diaphoretic.   HENT:      Head: Normocephalic.      Right Ear: External ear normal.      Left Ear: External ear normal.      Nose: Nose normal.   Eyes:      General: No scleral icterus.        Right eye: No discharge.         Left eye: No discharge.      Conjunctiva/sclera: Conjunctivae normal.      Pupils: Pupils are equal, round, and reactive to light.   Neck:      Thyroid: No thyromegaly.      Vascular: No carotid bruit.      Trachea: No tracheal deviation.   Cardiovascular:      Rate and Rhythm: Normal rate and regular rhythm.      Heart sounds: Normal heart sounds. No murmur heard.     Pulmonary:      Effort:  Pulmonary effort is normal. No respiratory distress.      Breath sounds: Normal breath sounds. No wheezing.   Chest:      Breasts: Breasts are symmetrical.         Right: Normal. No swelling, bleeding, inverted nipple, mass, nipple discharge, skin change or tenderness.         Left: Normal. No swelling, bleeding, inverted nipple, mass, nipple discharge, skin change or tenderness.   Abdominal:      General: There is no distension.      Palpations: Abdomen is soft. There is no mass.      Tenderness: There is no abdominal tenderness. There is no right CVA tenderness, left CVA tenderness or guarding.      Hernia: No hernia is present. There is no hernia in the left inguinal area or right inguinal area.   Genitourinary:     General: Normal vulva.      Exam position: Lithotomy position.      Labia:         Right: No rash, tenderness, lesion or injury.         Left: No rash, tenderness, lesion or injury.       Vagina: Normal. No signs of injury and foreign body. No vaginal discharge, erythema, tenderness or bleeding.      Cervix: Normal.      Uterus: Normal. Not enlarged, not fixed and not tender.       Adnexa: Right adnexa normal and left adnexa normal.        Right: No mass, tenderness or fullness.          Left: No mass, tenderness or fullness.        Rectum: Normal. No mass.      Comments:   BSU normal  Urethral meatus  Normal  Perineum  Normal  Musculoskeletal:         General: No tenderness. Normal range of motion.      Cervical back: Normal range of motion and neck supple.   Lymphadenopathy:      Head:      Right side of head: No submental, submandibular, tonsillar, preauricular, posterior auricular or occipital adenopathy.      Left side of head: No submental, submandibular, tonsillar, preauricular, posterior auricular or occipital adenopathy.      Cervical: No cervical adenopathy.      Right cervical: No superficial, deep or posterior cervical adenopathy.     Left cervical: No superficial, deep or posterior  cervical adenopathy.      Upper Body:      Right upper body: No supraclavicular, axillary or pectoral adenopathy.      Left upper body: No supraclavicular, axillary or pectoral adenopathy.      Lower Body: No right inguinal adenopathy. No left inguinal adenopathy.   Skin:     General: Skin is warm and dry.      Findings: No bruising, erythema or rash.   Neurological:      Mental Status: She is alert and oriented to person, place, and time.      Coordination: Coordination normal.   Psychiatric:         Mood and Affect: Mood normal.         Behavior: Behavior normal.         Thought Content: Thought content normal.         Judgment: Judgment normal.         Assessment/Plan   Diagnoses and all orders for this visit:    1. Well woman exam with routine gynecological exam (Primary)  Normal GYN exam. Will have lab work today. Encouraged SBE, pt is aware how to do self breast exam and the importance of same. Discussed weight management and importance of maintaining a healthy weight. Discussed Vitamin D intake and the importance of adequate vitamin D for both Bone Health and a healthy immune system.  Discussed Daily exercise and the importance of same, in regards to a healthy heart as well as helping to maintain her weight and improving her mental health.  BMI 24.5.   Mammogram will be scheduled at [facility] per her request Pap smear is done per [facility] guidelines.  -     Liquid-based Pap Smear, Screening  -     CBC & Differential  -     Comprehensive Metabolic Panel  -     Lipid Panel With LDL / HDL Ratio  -     TSH  -     T3, Uptake  -     T4, Free  -     Hemoglobin A1c  -     Urine Culture - , Urine, Clean Catch  -     UA / M With / Rflx Culture([facility] ONLY) - Urine, Clean Catch  -     Hepatitis C Antibody    2. Encounter for screening mammogram for breast cancer  Comments:  Patient will have a mammogram at [facility] per her request.  She is given an order today.  Orders:  -     Mammo Screening Digital Tomosynthesis  Bilateral With CAD; Future    3. Urinary frequency  Comments:  Patient continues to have urinary frequency.  We discussed IC.  She would like to try the diet and Elmiron.    4. Vitamin D deficiency  Comments:  Will have lab work drawn today.  Orders:  -     Vitamin D 25 Hydroxy    5. Interstitial cystitis  Comments:  Patient has several symptoms of interstitial cystitis.  She will start Elmiron and will start a IC diet.  She is given handouts on same.  She will return here in 6 weeks for follow-up.  We also discussed IC treatments.  Orders:  -     pentosan polysulfate (Elmiron) 100 MG capsule; Take 1 capsule by mouth 4 (Four) Times a Day.  Dispense: 120 capsule; Refill: 3    6. DUB (dysfunctional uterine bleeding)  Patient will try Provera day 16-25 to see if it will help regulate her cycles.  -     medroxyPROGESTERone (Provera) 10 MG tablet; Take 1 tablet by mouth Daily for 10 days. Take day 16-25 every month  Dispense: 30 tablet; Refill: 3         Patient's Body mass index is 24.54 kg/m². indicating that she is within normal range (BMI 18.5-24.9). No BMI management plan needed..      Nancy Erwin, APRN  6/29/2021

## 2021-06-29 NOTE — PATIENT INSTRUCTIONS
Eating Plan for Interstitial Cystitis  Interstitial cystitis (IC) is a long-term (chronic) condition that causes pain and pressure in the bladder, the lower abdomen, and the pelvic area. Other symptoms of IC include urinary urgency and frequency. Symptoms tend to come and go.  Many people with IC find that certain foods trigger their symptoms. Different foods may be problematic for different people. Some foods are more likely to cause symptoms than others. Learning which foods bother you and which do not can help you come up with an eating plan to manage IC.  What are tips for following this plan?  You may find it helpful to work with a dietitian. This health care provider can help you develop your eating plan by doing an elimination diet, which involves these steps:  · Start with a list of foods that you think trigger your IC symptoms along with the foods that most commonly trigger symptoms for many people with IC.  · Eliminate those foods from your diet for about one month, then start reintroducing the foods one at a time to see which ones trigger your symptoms.  · Make a list of the foods that trigger your symptoms. It may take several months to find out which foods bother you.  Reading food labels  Once you know which foods trigger your IC symptoms, you can avoid them. However, it is also a good idea to read food labels because some foods that trigger your symptoms may be included as ingredients in other foods. These ingredients may include:  · Chili peppers.  · Tomato products.  · Soy.  · Worcestershire sauce.  · Vinegar.  · Alcohol.  · Citrus flavors or juices.  · Artificial sweeteners.  · Monosodium glutamate.  Shopping  · Shopping can be a challenge if many foods trigger your IC. When you go grocery shopping, bring a list of the foods you can eat.  · You can get an mat for your phone that lets you know which foods are the safest and which you may want to avoid. You can find the mat at the Interstitial  Cystitis Network website: www.ic-network.com  Meal planning  · Plan your meals according to the results of your elimination diet. If you have not done an elimination diet, plan meals according to IC food lists recommended by your health care provider or dietitian. These lists tell you which foods are least and most likely to cause symptoms.  · Avoid certain types of food when you go out to eat, such as pizza and foods typically served at , [place], and [place] restaurants. These foods often contain ingredients that can aggravate IC.  General information  Here are some general guidelines for an IC eating plan:  · Do not eat large portions.  · Drink plenty of fluids with your meals.  · Do not eat foods that are high in sugar, salt, or saturated fat.  · Choose whole fruits instead of juice.  · Eat a colorful variety of vegetables.  What foods should I eat?  For people with IC, the best diet is a balanced one that includes things from all the food groups. Even if you have to avoid certain foods, there are still plenty of healthy choices in each group. The following are some foods that are least bothersome and may be safest to eat:  Fruits  Bananas. Blueberries and blueberry juice. Melons. Pears. Apples. Dates. Prunes. Raisins. Apricots.  Vegetables  Asparagus. Avocado. Celery. Beets. Bell peppers. Black olives. Broccoli. [place] sprouts. Cabbage. Carrots. Cauliflower. Cucumber. Eggplant. Green beans. Potatoes. Radishes. Spinach. Squash. Turnips. Zucchini. Mushrooms. Peas.  Grains  Oats. Rice. Bran. Oatmeal. Whole wheat bread.  Meats and other proteins  Beef. Fish and other seafood. Eggs. Nuts. Peanut butter. Pork. Poultry. Lamb. Garbanzo beans. Rayo beans.  Dairy  Whole or low-fat milk. American, mozzarella, mild cheddar, feta, ricotta, and cream cheeses.  The items listed above may not be a complete list of foods and beverages you can eat. Contact a dietitian for more information.  What foods should I avoid?  You  should avoid any foods that seem to trigger your symptoms. It is also a good idea to avoid foods that are most likely to cause symptoms in many people with IC. These include the following:  Fruits  Citrus fruits, including javad, limes, oranges, and grapefruit. Cranberries. Strawberries. Pineapple. Kiwi.  Vegetables  Chili peppers. Onions. Sauerkraut. Tomato and tomato products. Pickles.  Grains  You do not need to avoid any type of grain unless it triggers your symptoms.  Meats and other proteins  Precooked or cured meats, such as sausages or meat loaves. Soy products.  Dairy  Chocolate ice cream. Processed cheese. Yogurt.  Beverages  Alcohol. Chocolate drinks. Coffee. Cranberry juice. Carbonated drinks. Tea (black, green, or herbal). Tomato juice. Sports drinks.  The items listed above may not be a complete list of foods and beverages you should avoid. Contact a dietitian for more information.  Summary  · Many people with IC find that certain foods trigger their symptoms. Different foods may be problematic for different people. Some foods are more likely to cause symptoms than others.  · You may find it helpful to work with a dietitian to do an elimination diet and come up with an eating plan that is right for you.  · Plan your meals according to the results of your elimination diet. If you have not done an elimination diet, plan your meals using IC food lists. These lists tell you which foods are least and most likely to cause symptoms.  · The best diet for people with IC is a balanced diet that includes foods from all the food groups. Even if you have to avoid certain foods, there are still plenty of healthy choices in each group.  This information is not intended to replace advice given to you by your health care provider. Make sure you discuss any questions you have with your health care provider.  Document Revised: 04/09/2020 Document Reviewed: 08/22/2019  Elsevier Patient Education © 2021 [employer]  Inc[employer]  Interstitial Cystitis    Interstitial cystitis is inflammation of the bladder. This condition is also known as painful bladder syndrome. This may cause pain in the bladder area as well as a frequent and urgent need to urinate. The bladder is an organ that stores urine after the urine is made in the kidneys.  The severity of interstitial cystitis can vary from person to person. You may have flare-ups, and then your symptoms may go away for a while. For many people, it becomes a long-term (chronic) problem.  What are the causes?  The cause of this condition is not known.  What increases the risk?  The following factors may make you more likely to develop this condition:  · You are female.  · You have fibromyalgia.  · You have irritable bowel syndrome (IBS).  · You have endometriosis.  This condition may be aggravated by:  · Stress.  · Smoking.  · Spicy foods.  What are the signs or symptoms?  Symptoms of interstitial cystitis vary, and they can change over time. Symptoms may include:  · Discomfort or pain in the bladder area, which is in the lower abdomen. Pain can range from mild to severe. The pain may change in intensity as the bladder fills with urine or as it empties.  · Pain in the pelvic area, between the hip bones.  · An urgent need to urinate.  · Frequent urination.  · Pain during urination.  · Pain during sex.  · Blood in the urine.  · Fatigue.  For women, symptoms often get worse during menstruation.  How is this diagnosed?  This condition is diagnosed based on your symptoms, your medical history, and a physical exam. You may have tests to rule out other conditions, such as:  · Urine tests.  · Cystoscopy. For this test, a tool similar to a very thin telescope is used to look into your bladder.  · Biopsy. This involves taking a sample of tissue from the bladder to be examined under a microscope.  How is this treated?  There is no cure for this condition, but treatment can help you control your  symptoms. Work closely with your health care provider to find the most effective treatments for you. Treatment options may include:  · Medicines to relieve pain and reduce how often you feel the need to urinate. This treatment may include:  ? A procedure where a small amount of medicine that eases irritation is put inside your bladder through a catheter (bladder instillation).  · Lifestyle changes, such as changing your diet or taking steps to control stress.  · Physical therapy. This may include:  ? Exercises to help relax the pelvic floor muscles.  ? Massage to relax tight muscles (myofascial release).  · Learning ways to control when you urinate (bladder training).  · Using a device that provides electrical stimulation to your nerves, which can relieve pain (neuromodulation therapy). The device is placed on your back, where it blocks the nerves that cause you to feel pain in your bladder area.  · A procedure that stretches your bladder by filling it with air or fluid (hydrodistention).  · Surgery. This is rare. It is only done for extreme cases, if other treatments do not help.  Follow these instructions at home:  Lifestyle  · Learn and practice relaxation techniques, such as deep breathing and muscle relaxation.  · Get care for your body and mental well-being, such as:  ? Cognitive behavioral therapy (CBT). This therapy changes the way you think or act in response to the fatigue. This may help improve how you feel.  ? Seeing a mental health therapist to evaluate and treat depression, if necessary.  · Work with your health care provider on other ways to manage pain. Acupuncture may be helpful.  · Avoid drinking alcohol.  · Do not use any products that contain nicotine or tobacco, such as cigarettes, e-cigarettes, and chewing tobacco. If you need help quitting, ask your health care provider.  Eating and drinking  · Make dietary changes as recommended by your health care provider. You may need to avoid:  ? Spicy  foods.  ? Foods that contain a lot of potassium.  · Limit your intake of drinks that make you need to urinate. These include:  ? Caffeinated drinks like soda, coffee, and tea.  ? Alcohol.  Bladder training    · Use bladder training techniques as directed. Techniques may include:  ? Urinating at scheduled times.  ? Training yourself to delay urination.  · Keep a bladder diary.  ? Write down the times that you urinate and any symptoms that you have. This can help you find out which foods, liquids, or activities make your symptoms worse.  ? Use your bladder diary to schedule bathroom trips. If you are away from home, plan to be near a bathroom at each of your scheduled times.  · Make sure that you urinate just before you leave the house and just before you go to bed.  General instructions  · Take over-the-counter and prescription medicines only as told by your health care provider.  · You can try a warm or cool compress over your bladder for comfort.  · Avoid wearing tight clothing.  · Do exercises to relax your pelvic floor muscles as told by your physical therapist.  · Keep all follow-up visits as told by your health care provider. This is important.  Where to find more information  To find more information or a support group near you, visit:  · Urology Care Foundation: urologyhealth.org  · Interstitial Cystitis Association: ichelp.org  Contact a health care provider if you have:  · Symptoms that do not get better with treatment.  · Pain or discomfort that gets worse.  · More frequent urges to urinate.  · A fever.  Get help right away if:  · You have no control over when you urinate.  Summary  · Interstitial cystitis is inflammation of the bladder.  · This condition may cause pain in the bladder area as well as a frequent and urgent need to urinate.  · You may have flare-ups of the condition, and then it may go away for a while. For many people, it becomes a long-term (chronic) problem.  · There is no cure for  interstitial cystitis, but treatment methods are available to control your symptoms.  This information is not intended to replace advice given to you by your health care provider. Make sure you discuss any questions you have with your health care provider.  Document Revised: 02/03/2021 Document Reviewed: 11/12/2018  [employer] Patient Education © 2021 [employer]  BMI for Adults  What is BMI?  Body mass index (BMI) is a number that is calculated from a person's weight and height. BMI can help estimate how much of a person's weight is composed of fat. BMI does not measure body fat directly. Rather, it is an alternative to procedures that directly measure body fat, which can be difficult and expensive.  BMI can help identify people who may be at higher risk for certain medical problems.  What are BMI measurements used for?  BMI is used as a screening tool to identify possible weight problems. It helps determine whether a person is obese, overweight, a healthy weight, or underweight.  BMI is useful for:  · Identifying a weight problem that may be related to a medical condition or may increase the risk for medical problems.  · Promoting changes, such as changes in diet and exercise, to help reach a healthy weight. BMI screening can be repeated to see if these changes are working.  How is BMI calculated?  BMI involves measuring your weight in relation to your height. Both height and weight are measured, and the BMI is calculated from those numbers. This can be done either in English (U[place] or metric measurements. Note that charts and online BMI calculators are available to help you find your BMI quickly and easily without having to do these calculations yourself.  To calculate your BMI in English ([place] measurements:    1. Measure your weight in pounds (lb).  2. Multiply the number of pounds by 703.  ? For example, for a person who weighs 180 lb, multiply that number by 703, which equals 126,540.  3. Measure your height  "in inches. Then multiply that number by itself to get a measurement called \"inches squared.\"  ? For example, for a person who is 70 inches tall, the \"inches squared\" measurement is 70 inches x 70 inches, which equals 4,900 inches squared.  4. Divide the total from step 2 (number of lb x 703) by the total from step 3 (inches squared): 126,540 ÷ 4,900 = 25.8. This is your BMI.  To calculate your BMI in metric measurements:  1. Measure your weight in kilograms (kg).  2. Measure your height in meters (m). Then multiply that number by itself to get a measurement called \"meters squared.\"  ? For example, for a person who is 1.75 m tall, the \"meters squared\" measurement is 1.75 m x 1.75 m, which is equal to 3.1 meters squared.  3. Divide the number of kilograms (your weight) by the meters squared number. In this example: 70 ÷ 3.1 = 22.6. This is your BMI.  What do the results mean?  BMI charts are used to identify whether you are underweight, normal weight, overweight, or obese. The following guidelines will be used:  · Underweight: BMI less than 18.5.  · Normal weight: BMI between 18.5 and 24.9.  · Overweight: BMI between 25 and 29.9.  · Obese: BMI of 30 or above.  Keep these notes in mind:  · Weight includes both fat and muscle, so someone with a muscular build, such as an athlete, may have a BMI that is higher than 24.9. In cases like these, BMI is not an accurate measure of body fat.  · To determine if excess body fat is the cause of a BMI of 25 or higher, further assessments may need to be done by a health care provider.  · BMI is usually interpreted in the same way for men and women.  Where to find more information  For more information about BMI, including tools to quickly calculate your BMI, go to these websites:  · Centers for Disease Control and Prevention: www.cdc.gov  · American Heart Association: www.heart.org  · National Heart, Lung, and Blood [place]: www.nhlbi.nih.gov  Summary  · Body mass index (BMI) is a " number that is calculated from a person's weight and height.  · BMI may help estimate how much of a person's weight is composed of fat. BMI can help identify those who may be at higher risk for certain medical problems.  · BMI can be measured using English measurements or metric measurements.  · BMI charts are used to identify whether you are underweight, normal weight, overweight, or obese.  This information is not intended to replace advice given to you by your health care provider. Make sure you discuss any questions you have with your health care provider.  Document Revised: 09/09/2020 Document Reviewed: 07/17/2020  Elsevier Patient Education © 2021 Else[employer] Inc.

## 2021-07-01 LAB
25(OH)D3+25(OH)D2 SERPL-MCNC: 63.9 NG/ML (ref 30–100)
ALBUMIN SERPL-MCNC: 4.7 G/DL (ref 3.5–5.2)
ALBUMIN/GLOB SERPL: 1.7 G/DL
ALP SERPL-CCNC: 63 U/L (ref 39–117)
ALT SERPL-CCNC: 23 U/L (ref 1–33)
APPEARANCE UR: CLEAR
AST SERPL-CCNC: 18 U/L (ref 1–32)
BACTERIA #/AREA URNS HPF: NORMAL /HPF
BACTERIA UR CULT: NORMAL
BACTERIA UR CULT: NORMAL
BASOPHILS # BLD AUTO: 0.03 10*3/MM3 (ref 0–0.2)
BASOPHILS NFR BLD AUTO: 0.6 % (ref 0–1.5)
BILIRUB SERPL-MCNC: 0.6 MG/DL (ref 0–1.2)
BILIRUB UR QL STRIP: NEGATIVE
BUN SERPL-MCNC: 15 MG/DL (ref 6–20)
BUN/CREAT SERPL: 20.3 (ref 7–25)
CALCIUM SERPL-MCNC: 9.1 MG/DL (ref 8.6–10.5)
CASTS URNS QL MICRO: NORMAL /LPF
CHLORIDE SERPL-SCNC: 102 MMOL/L (ref 98–107)
CHOLEST SERPL-MCNC: 159 MG/DL (ref 0–200)
CO2 SERPL-SCNC: 26.6 MMOL/L (ref 22–29)
COLOR UR: YELLOW
CREAT SERPL-MCNC: 0.74 MG/DL (ref 0.57–1)
EOSINOPHIL # BLD AUTO: 0.1 10*3/MM3 (ref 0–0.4)
EOSINOPHIL NFR BLD AUTO: 2 % (ref 0.3–6.2)
EPI CELLS #/AREA URNS HPF: NORMAL /HPF (ref 0–10)
ERYTHROCYTE [DISTWIDTH] IN BLOOD BY AUTOMATED COUNT: 11.8 % (ref 12.3–15.4)
GEN CATEG CVX/VAG CYTO-IMP: NORMAL
GLOBULIN SER CALC-MCNC: 2.7 GM/DL
GLUCOSE SERPL-MCNC: 97 MG/DL (ref 65–99)
GLUCOSE UR QL: NEGATIVE
HBA1C MFR BLD: 5.1 % (ref 4.8–5.6)
HCT VFR BLD AUTO: 41.8 % (ref 34–46.6)
HCV AB S/CO SERPL IA: 0.2 S/CO RATIO (ref 0–0.9)
HDLC SERPL-MCNC: 59 MG/DL (ref 40–60)
HGB BLD-MCNC: 13.5 G/DL (ref 12–15.9)
HGB UR QL STRIP: NEGATIVE
HPV I/H RISK 4 DNA CVX QL PROBE+SIG AMP: NOT DETECTED
IMM GRANULOCYTES # BLD AUTO: 0.01 10*3/MM3 (ref 0–0.05)
IMM GRANULOCYTES NFR BLD AUTO: 0.2 % (ref 0–0.5)
KETONES UR QL STRIP: ABNORMAL
LAB AP CASE REPORT: NORMAL
LAB AP GYN ADDITIONAL INFORMATION: NORMAL
LDLC SERPL CALC-MCNC: 90 MG/DL (ref 0–100)
LDLC/HDLC SERPL: 1.53 {RATIO}
LEUKOCYTE ESTERASE UR QL STRIP: NEGATIVE
LYMPHOCYTES # BLD AUTO: 1.61 10*3/MM3 (ref 0.7–3.1)
LYMPHOCYTES NFR BLD AUTO: 32.1 % (ref 19.6–45.3)
MCH RBC QN AUTO: 29.2 PG (ref 26.6–33)
MCHC RBC AUTO-ENTMCNC: 32.3 G/DL (ref 31.5–35.7)
MCV RBC AUTO: 90.5 FL (ref 79–97)
MICRO URNS: ABNORMAL
MICRO URNS: ABNORMAL
MONOCYTES # BLD AUTO: 0.55 10*3/MM3 (ref 0.1–0.9)
MONOCYTES NFR BLD AUTO: 11 % (ref 5–12)
NEUTROPHILS # BLD AUTO: 2.71 10*3/MM3 (ref 1.7–7)
NEUTROPHILS NFR BLD AUTO: 54.1 % (ref 42.7–76)
NITRITE UR QL STRIP: NEGATIVE
NRBC BLD AUTO-RTO: 0 /100 WBC (ref 0–0.2)
PATH INTERP SPEC-IMP: NORMAL
PH UR STRIP: 6 [PH] (ref 5–7.5)
PLATELET # BLD AUTO: 234 10*3/MM3 (ref 140–450)
POTASSIUM SERPL-SCNC: 3.9 MMOL/L (ref 3.5–5.2)
PROT SERPL-MCNC: 7.4 G/DL (ref 6–8.5)
PROT UR QL STRIP: ABNORMAL
RBC # BLD AUTO: 4.62 10*6/MM3 (ref 3.77–5.28)
RBC #/AREA URNS HPF: NORMAL /HPF (ref 0–2)
SODIUM SERPL-SCNC: 139 MMOL/L (ref 136–145)
SP GR UR: 1.02 (ref 1–1.03)
STAT OF ADQ CVX/VAG CYTO-IMP: NORMAL
T3RU NFR SERPL: 27 % (ref 24–39)
T4 FREE SERPL-MCNC: 1.14 NG/DL (ref 0.93–1.7)
TRIGL SERPL-MCNC: 48 MG/DL (ref 0–150)
TSH SERPL DL<=0.005 MIU/L-ACNC: 1.46 UIU/ML (ref 0.27–4.2)
URINALYSIS REFLEX: ABNORMAL
UROBILINOGEN UR STRIP-MCNC: 0.2 MG/DL (ref 0.2–1)
VLDLC SERPL CALC-MCNC: 10 MG/DL (ref 5–40)
WBC # BLD AUTO: 5.01 10*3/MM3 (ref 3.4–10.8)
WBC #/AREA URNS HPF: NORMAL /HPF (ref 0–5)

## 2021-07-23 DIAGNOSIS — Z12.31 ENCOUNTER FOR SCREENING MAMMOGRAM FOR BREAST CANCER: ICD-10-CM

## 2021-07-29 ENCOUNTER — TREATMENT (OUTPATIENT)
Dept: PHYSICAL THERAPY | Facility: CLINIC | Age: 46
End: 2021-07-29

## 2021-07-29 DIAGNOSIS — R30.0 DYSURIA: ICD-10-CM

## 2021-07-29 DIAGNOSIS — R35.0 FREQUENCY OF MICTURITION: Primary | ICD-10-CM

## 2021-07-29 DIAGNOSIS — N94.10 DYSPAREUNIA, FEMALE: ICD-10-CM

## 2021-07-29 DIAGNOSIS — R39.15 URINARY URGENCY: ICD-10-CM

## 2021-07-29 PROCEDURE — 97162 PT EVAL MOD COMPLEX 30 MIN: CPT | Performed by: PHYSICAL THERAPIST

## 2021-07-29 PROCEDURE — 97110 THERAPEUTIC EXERCISES: CPT | Performed by: PHYSICAL THERAPIST

## 2021-07-29 NOTE — PROGRESS NOTES
Physical Therapy Initial Evaluation and Plan of Care    Patient: Natalie Lawrence              : 1975  Today's Date: 2021  Referring practitioner: Maycol Perera MD  Date of Initial Visit: 2021  Patient seen for 1 sessions    Visit Diagnoses:    ICD-10-CM ICD-9-CM   1. Frequency of micturition  R35.0 788.41   2. Dysuria  R30.0 788.1     Past Medical History:   Diagnosis Date   • Anxiety    • Macular degeneration    • Urinary tract infection      Past Surgical History:   Procedure Laterality Date   •  SECTION     • DILATATION AND CURETTAGE     • LYMPH NODE BIOPSY         SUBJECTIVE     Subjective Evaluation    History of Present Illness  Mechanism of injury:  (2 months early); Born . (4 # 11 oz)- [employer]    Subjective comment: She had frequent UTIsfor a long time. Most recently in Feb she had a flare that has not gone away. She has tried to cut out bladder irritants, notices coffee was very irritating. She feels like symptoms are worse in the morning. She does remember being towards the end of her pregnancy and feeling a lot pressure. Sitting made everything worse, laying down improved (this has greatly improved in the last month). Notices a feeling of her baldder moving when she rolls up out of bed. She also has pain with sex. Pain  Current pain rating: 3  At best pain ratin  At worst pain ratin  Relieving factors: rest (AZO, laying down)  Exacerbated by: sitting, intercourse, bladder irritants, bending.       Does reports some sciatic pain down her R leg in the past, but not several months. She did reports sitting for 23 years. Currently not still working.     Aggravating factors include urination, gynecologic exam and intercourse/sexual activity.   Bowel habits include BM's once a day. Difficulties with bowel include none  Bladder habits include a daytime frequency of can be up to every 30 minutes and nighttime frequency of 0-1. Bladder difficulties  include urgency and frequency.  Sexual activity include currently sexually active.  Special concerns: not applicable    2 mugs of    Outcome Measure: PFDI-20: 8.25, 0, 25     2 cups of water at least, milk at night.   OBJECTIVE     Objective       Therapy Education/Self Care [id number]   Details:    Given Home Exercise Program  [facility] HEP (access code [email or web address])   Progress: New   Education provided to:  Patient   Level of understanding Verbalized and Demonstrated   Timed Minutes      Therapeutic Exercises    [id number] Comments   Pillow prop    Happy baby                Timed Minutes 10         Passive Hip ROM    Hip IR  Hip ER   Right 22 Right 38   Left 24 Left 28     Hip Abduction Strength (Glut Med): R 4- L 4-   SLR/TA contraction: decreased TrA activation, decent pelvic stability  SLS R 5-6 seconds L 10 seconds, decreased hip and ankle stability R>L     Total Timed Treatment:     10   mins  Total Time of Visit:            60   mins    ASSESSMENT/PLAN          Goals                                                                       Progress Note due by 8/28/21                                                                                     Re-cert: 10/27/21   STG by: Comments Status   Patient will have a 40% reduction in pelvic floor tension        Pt will be independent with initial HEP, in order to accelerate progress.       Pt will score 1-2/3 on Marinoff scale.       LTG by:       Pt will be independent with HEP including core, hip and PF strengthening.       Pt will demonstrate 4/5 or greater hip abduction strength.        Patient will demonstrate a 4+/5 grade strength of levator ani muscles bilaterally         Pt will score 20 or less on the PFDI-20       Pt will score 0-1/3 on Marinoff scale.      Assessment & Plan     Assessment  Impairments: abnormal or restricted ROM, activity intolerance, impaired physical strength and pain with function  Assessment details: Natalie presents with long history of frequent  urination, urgency, recurrent UTIS, painful intercourse that has been worsening over the last several months. Upon assessment today she had decreased rib expansion with breathing, poor hip/core stability, decreased hip PROM, all placing increased tension on pelvic floor and causing pain with activity. Skilled PT is needed to address soft tissue and joint restrictions and progress towards independent HEP. She is very motivated and I believe she will progress very well with physical therapy. Thank you for the referral.     Prognosis: good  Functional Limitations: uncomfortable because of pain  Plan  Therapy options: will be seen for skilled physical therapy services  Planned modality interventions: TENS and dry needling  Planned therapy interventions: manual therapy, motor coordination training, neuromuscular re-education, postural training, soft tissue mobilization, spinal/joint mobilization, strengthening, stretching, therapeutic activities, IADL retraining, home exercise program, gait training, functional ROM exercises, ADL retraining, abdominal trunk stabilization and body mechanics training  Frequency: 1x week  Duration in visits: 16  Duration in weeks: 16  Treatment plan discussed with: patient  Plan details: We will initially work on hip mobility and surrounding soft tissue restrictions. We will then progress with internal pelvic floor work. We will use sEMG/biofeedback for pelvic floor relaxation and strengthening. We will then progress with core/hip strengthening and towards independent HEP.           PT SIGNATURE: Margaret Sam, PT DPT   DATE TREATMENT INITIATED: 7/29/2021      Initial Certification  Certification Period: 7/29/2021 through 10/27/2021  I certify that the therapy services are furnished while this patient is under my care.  The services outlined above are required by this patient, and will be reviewed every 90 days.     PHYSICIAN:   Maycol Perera,  MD__________________________________DATE: _________    Please sign and return via fax to [phone].   Thank you so much for letting us work with Natalie. I appreciate your letting us work with your patients. If you have any questions or concerns, please don't hesitate to contact me.

## 2021-08-26 ENCOUNTER — TREATMENT (OUTPATIENT)
Dept: PHYSICAL THERAPY | Facility: CLINIC | Age: 46
End: 2021-08-26

## 2021-08-26 DIAGNOSIS — R39.15 URINARY URGENCY: ICD-10-CM

## 2021-08-26 DIAGNOSIS — R35.0 FREQUENCY OF MICTURITION: Primary | ICD-10-CM

## 2021-08-26 DIAGNOSIS — N94.10 DYSPAREUNIA, FEMALE: ICD-10-CM

## 2021-08-26 DIAGNOSIS — R30.0 DYSURIA: ICD-10-CM

## 2021-08-26 PROCEDURE — 97530 THERAPEUTIC ACTIVITIES: CPT | Performed by: PHYSICAL THERAPIST

## 2021-08-26 PROCEDURE — 97140 MANUAL THERAPY 1/> REGIONS: CPT | Performed by: PHYSICAL THERAPIST

## 2021-08-26 PROCEDURE — 97110 THERAPEUTIC EXERCISES: CPT | Performed by: PHYSICAL THERAPIST

## 2021-08-26 NOTE — PROGRESS NOTES
Physical Therapy Treatment Note    Patient: Natalie Lawrence                                                                                     Visit Date: 2021  :     1975    Referring practitioner:    Maycol Perera MD  Date of Initial Visit:          Type: THERAPY  Noted: 2021    Patient seen for 2 sessions    Visit Diagnoses:    ICD-10-CM ICD-9-CM   1. Frequency of micturition  R35.0 788.41   2. Dysuria  R30.0 788.1   3. Urinary urgency  R39.15 788.63   4. Dyspareunia, female  N94.10 625.0     SUBJECTIVE     Subjective She does notices coffee and carbonated drinks tend to irritate. She is unsure if any of the exercise changes. Water does tend to help with symptoms.     PAIN: 0/10         OBJECTIVE     Objective      Manual Therapy     72338  Comments   Prone STM with foam to thoracolumbar chi and gluts    Prone hip IR/ER mobilizations with oscillations    Prone LS distraction    Prone external PF MFR    HL LAD (B)    HL pelvic diagram MFR        Timed Minutes 20     Therapeutic Exercises    00975 Comments   Reviewed HEP                    Timed Minutes 10     Therapeutic Activities    66618 Comments   Reviewed bladder irritants/voiding patterns                    Timed Minutes 10     Therapy Education/Self Care 48222   Details:    Given Home Exercise Program  postural retraining   Progress: Reinforced   Education provided to:  Patient   Level of understanding Verbalized   Timed Minutes          Total Timed Treatment:     40   mins  Total Time of Visit:             43   mins         ASSESSMENT/PLAN     GOALS  Goals                                                                       Progress Note due by 21                                                                                     Re-cert: 10/27/21   STG by: Comments Status   Patient will have a 40% reduction in pelvic floor tension      Ongoing   Pt will be independent with initial HEP, in order to accelerate progress.     Met    Pt will score 1-2/3 on Marinoff scale.     Ongoing   LTG by:        Pt will be independent with HEP including core, hip and PF strengthening.     ongoing   Pt will demonstrate 4/5 or greater hip abduction strength.      Ongoing   Patient will demonstrate a 4+/5 grade strength of levator ani muscles bilaterally      Ongoing    Pt will score 20 or less on the PFDI-20    ongoing    Pt will score 0-1/3 on Marinoff scale.    ongoing         Assessment/Plan     ASSESSMENT: Today was her first visit since initial evaluation. She has met one short term goal. Her hips and surrounding soft tissue is still restricted. We will progress with PF assessment next visit and proceed as needed.     PLAN: Assess PF, progress with strengthening vs relaxation as needed.     Signature: Margaret Sam, PT DPT

## 2021-09-02 ENCOUNTER — TREATMENT (OUTPATIENT)
Dept: PHYSICAL THERAPY | Facility: CLINIC | Age: 46
End: 2021-09-02

## 2021-09-02 DIAGNOSIS — N94.10 DYSPAREUNIA, FEMALE: ICD-10-CM

## 2021-09-02 DIAGNOSIS — R39.15 URINARY URGENCY: ICD-10-CM

## 2021-09-02 DIAGNOSIS — R35.0 FREQUENCY OF MICTURITION: Primary | ICD-10-CM

## 2021-09-02 DIAGNOSIS — R30.0 DYSURIA: ICD-10-CM

## 2021-09-02 PROCEDURE — 97110 THERAPEUTIC EXERCISES: CPT | Performed by: PHYSICAL THERAPIST

## 2021-09-02 PROCEDURE — 97140 MANUAL THERAPY 1/> REGIONS: CPT | Performed by: PHYSICAL THERAPIST

## 2021-09-02 NOTE — PROGRESS NOTES
Physical Therapy Treatment Note and 30 Day Progress Note    Patient: Natalie Lawrence                                                                                     Visit Date: 2021  :     1975    Referring practitioner:    Maycol Perera MD  Date of Initial Visit:          Type: THERAPY  Noted: 2021    Patient seen for 3 sessions    Visit Diagnoses:    ICD-10-CM ICD-9-CM   1. Frequency of micturition  R35.0 788.41   2. Dysuria  R30.0 788.1   3. Urinary urgency  R39.15 788.63   4. Dyspareunia, female  N94.10 625.0     SUBJECTIVE     Subjective Her symptoms have been ok this past week. She feels like her symptoms are at the best they have been in a while/normal. She is still avoiding more irritating drinks. She was able to eat taco soup earlier this week without flaring.     PAIN: 0/10         OBJECTIVE     Objective      Manual Therapy     [id number]  Comments   Prone STM with foam to thoracolumbar chi and gluts    Prone hip IR/ER mobilizations with oscillations    Prone LS distraction    Prone external PF MFR    prone LAD (B)            Timed Minutes 20     Therapeutic Exercises    49017 Comments   Clams in HL red TB  2 X 15   BKFO with red TB  1 X 20   Bridge with breathing     Updated/reviewed HEP        Timed Minutes 23       Therapy Education/Self Care 74959   Details: 6HRDWTMG   Given Home Exercise Program  postural retraining   Progress: Reinforced   Education provided to:  Patient   Level of understanding Verbalized   Timed Minutes          Total Timed Treatment:     43   mins  Total Time of Visit:             43   mins         ASSESSMENT/PLAN     GOALS  Goals                                           Progress Note due by 10/2/21                                                                                     Re-cert: 10/27/21   STG by: Comments Status   Patient will have a 40% reduction in pelvic floor tension    did not assess  Ongoing   Pt will be independent with initial HEP, in  order to accelerate progress.     Met   Pt will score 1-2/3 on Marinoff scale.     Ongoing   LTG by:        Pt will be independent with HEP including core, hip and PF strengthening.   progressed today  ongoing   Pt will demonstrate 4/5 or greater hip abduction strength.    progressed with HEP  Ongoing   Patient will demonstrate a 4+/5 grade strength of levator ani muscles bilaterally    did not assess  Ongoing    Pt will score 20 or less on the PFDI-20  8.25  Met    Pt will score 0-1/3 on Marinoff scale.  did not assess  ongoing         Assessment/Plan     ASSESSMENT: Natalie met one long term goal. She is progressing towards others and overall had an improved week of symptoms. This is her 2nd treatment since initial evaluation. She has ongoing bladder flares, but some improvements this last week. She also has find more triggers, including carbonated and caffienated beverages. Skilled PT needed to address PF deficits.     PLAN: Assess PF, progress with strengthening vs relaxation as needed.     Signature: Margaret Sam, PT DPT

## 2021-09-16 ENCOUNTER — TREATMENT (OUTPATIENT)
Dept: PHYSICAL THERAPY | Facility: CLINIC | Age: 46
End: 2021-09-16

## 2021-09-16 DIAGNOSIS — N94.10 DYSPAREUNIA, FEMALE: ICD-10-CM

## 2021-09-16 DIAGNOSIS — R30.0 DYSURIA: ICD-10-CM

## 2021-09-16 DIAGNOSIS — R39.15 URINARY URGENCY: ICD-10-CM

## 2021-09-16 DIAGNOSIS — R35.0 FREQUENCY OF MICTURITION: Primary | ICD-10-CM

## 2021-09-16 PROCEDURE — 97112 NEUROMUSCULAR REEDUCATION: CPT | Performed by: PHYSICAL THERAPIST

## 2021-09-16 PROCEDURE — 97110 THERAPEUTIC EXERCISES: CPT | Performed by: PHYSICAL THERAPIST

## 2021-09-16 NOTE — PROGRESS NOTES
Physical Therapy Treatment Note and 30 Day Progress Note    Patient: Natalie Lawrence                                                                                     Visit Date: 2021  :     1975    Referring practitioner:    Maycol Perera MD  Date of Initial Visit:          Type: THERAPY  Noted: 2021    Patient seen for 4 sessions    Visit Diagnoses:    ICD-10-CM ICD-9-CM   1. Frequency of micturition  R35.0 788.41   2. Dysuria  R30.0 788.1   3. Urinary urgency  R39.15 788.63   4. Dyspareunia, female  N94.10 625.0     SUBJECTIVE     Subjective She reports after eating Somali and having intercourse twice last week. Had a flare starting , today is better, but still there. She does report improvements for the last month prior to the flare.     PAIN: 0/10         OBJECTIVE     Objective        Therapeutic Exercises    23733 Comments               Updated/reviewed HEP        Timed Minutes 10     Neuromuscular Reeducation     94665 Comments   SEMG/biofeedback to external PF in SL  5-7 mV holding 10 seconds; increased time needed to return to relaxation; quick contractions   SEMG/biofeedback to external PF in sitting 2-3 mV; unable to hold               Timed Minutes 30       Therapy Education/Self Care 02979   Details: 6HRDWTMG   Given Home Exercise Program  postural retraining   Progress: Reinforced   Education provided to:  Patient   Level of understanding Verbalized   Timed Minutes          Total Timed Treatment:     40   mins  Total Time of Visit:             43   mins         ASSESSMENT/PLAN     GOALS  Goals                                           Progress Note due by 10/2/21                                                                     Re-cert: 10/27/21   STG by: Comments Status   Patient will have a 40% reduction in pelvic floor tension    normal resting tone today  Met   Pt will be independent with initial HEP, in order to accelerate progress.     Met   Pt will score 1-2/3 on  Marinoff scale.     Ongoing   LTG by:        Pt will be independent with HEP including core, hip and PF strengthening.   progressed today  ongoing   Pt will demonstrate 4/5 or greater hip abduction strength.    progressed with HEP  Ongoing   Patient will demonstrate a 4+/5 grade strength of levator ani muscles bilaterally    progressing  Ongoing    Pt will score 20 or less on the PFDI-20  8.25  Met    Pt will score 0-1/3 on Marinoff scale.  did not assess  ongoing         Assessment/Plan     ASSESSMENT: Today she demonstrated WFL endurance of her PF in sidelying, but did need increased time for full relaxation. She had decreased endurance in sitting. She had a flare this past week, but prior was doing significantly better for almost a month.     PLAN: Progress note. Progress with PF, core, and hip  Strengthening and coordination.     Signature: Margaret Sam, PT DPT

## 2021-10-14 ENCOUNTER — TREATMENT (OUTPATIENT)
Dept: PHYSICAL THERAPY | Facility: CLINIC | Age: 46
End: 2021-10-14

## 2021-10-14 DIAGNOSIS — N94.10 DYSPAREUNIA, FEMALE: ICD-10-CM

## 2021-10-14 DIAGNOSIS — R30.0 DYSURIA: ICD-10-CM

## 2021-10-14 DIAGNOSIS — R39.15 URINARY URGENCY: ICD-10-CM

## 2021-10-14 DIAGNOSIS — R35.0 FREQUENCY OF MICTURITION: Primary | ICD-10-CM

## 2021-10-14 PROCEDURE — 97110 THERAPEUTIC EXERCISES: CPT | Performed by: PHYSICAL THERAPIST

## 2021-10-14 NOTE — PROGRESS NOTES
Physical Therapy Treatment Note and 90 Day Recertification Note    Patient: Natalie Lawrence                                                                                     Visit Date: 10/14/2021  :     1975    Referring practitioner:    Maycol Perera MD  Date of Initial Visit:          Type: THERAPY  Noted: 2021    Patient seen for 5 sessions    Visit Diagnoses:    ICD-10-CM ICD-9-CM   1. Frequency of micturition  R35.0 788.41   2. Dysuria  R30.0 788.1   3. Urinary urgency  R39.15 788.63   4. Dyspareunia, female  N94.10 625.0     SUBJECTIVE     Subjective She has an appointment with Dr. Barrios on . She has been keeping a journal and noticing some of her symptoms being associated with her cycle. She also notices what she eats affects her symptoms. She hasn't had intercourse since last visit, it is not painful if she is properly lubricated, but does cause irration 12-24 hours.  She did find a coffee substitute she can drink and doesn't notice it aggravated her.     PAIN: 0/10         OBJECTIVE     Objective        Therapeutic Exercises    19305 Comments   Updated/reviewed HEP    Standing fire hydrants 2 X 15 each    Quadruped hip extensions 2 X 20   SL (B) clams X 20           Updated/reviewed HEP         Timed Minutes 40       Therapy Education/Self Care 07859   Details: 6HRDWTMG   Given Home Exercise Program  postural retraining   Progress: Reinforced   Education provided to:  Patient   Level of understanding Verbalized   Timed Minutes          Total Timed Treatment:     40   mins  Total Time of Visit:             43   mins         ASSESSMENT/PLAN     GOALS  Goals                                           Progress Note due by 21                                                                     Re-cert: 21   STG by: Comments Status   Patient will have a 40% reduction in pelvic floor tension    normal resting tone today  Met   Pt will be independent with initial HEP, in  order to accelerate progress.     Met   Pt will score 1-2/3 on Marinoff scale.     Ongoing   LTG by:        Pt will be independent with HEP including core, hip and PF strengthening.   progressed today  ongoing   Pt will demonstrate 4/5 or greater hip abduction strength.    progressed with HEP  Ongoing   Patient will demonstrate a 4+/5 grade strength of levator ani muscles bilaterally    unable to progress   Ongoing    Pt will score 20 or less on the PFDI-20  8.25  Met    Pt will score 0-1/3 on Marinoff scale.  has not completed intercourse   ongoing         Assessment & Plan     Assessment  Impairments: abnormal or restricted ROM, activity intolerance, impaired physical strength and pain with function  Assessment details:     Prognosis: good  Functional Limitations: uncomfortable because of pain  Plan  Therapy options: will be seen for skilled physical therapy services  Planned modality interventions: TENS and dry needling  Planned therapy interventions: manual therapy, motor coordination training, neuromuscular re-education, postural training, soft tissue mobilization, spinal/joint mobilization, strengthening, stretching, therapeutic activities, IADL retraining, home exercise program, gait training, functional ROM exercises, ADL retraining, abdominal trunk stabilization and body mechanics training  Frequency: 1-4 times per month.  Duration in visits: 10  Treatment plan discussed with: patient  Plan details:            ASSESSMENT: She had reduced symptoms, but ongoing limited diet and intercourse. Her intercourse is not painful during, but multiple hours after. She is seeing a urogyn at the first of next month. We are planning to hold until she sees him and then will follow up as needed. I do believe we still need to work on PF relaxation and progress with strengthening.       PLAN: see assessment    Signature: Margaret Sam, PT DPT

## 2022-03-18 ENCOUNTER — OFFICE VISIT (OUTPATIENT)
Dept: FAMILY MEDICINE CLINIC | Facility: CLINIC | Age: 47
End: 2022-03-18

## 2022-03-18 VITALS
BODY MASS INDEX: 25.05 KG/M2 | RESPIRATION RATE: 16 BRPM | OXYGEN SATURATION: 99 % | SYSTOLIC BLOOD PRESSURE: 147 MMHG | HEART RATE: 87 BPM | TEMPERATURE: 97.3 F | DIASTOLIC BLOOD PRESSURE: 81 MMHG | HEIGHT: 70 IN | WEIGHT: 175 LBS

## 2022-03-18 DIAGNOSIS — M79.602 LEFT ARM PAIN: Primary | ICD-10-CM

## 2022-03-18 PROCEDURE — 99213 OFFICE O/P EST LOW 20 MIN: CPT | Performed by: FAMILY MEDICINE

## 2022-04-18 ENCOUNTER — TRANSCRIBE ORDERS (OUTPATIENT)
Dept: PHYSICAL THERAPY | Facility: CLINIC | Age: 47
End: 2022-04-18

## 2022-04-18 DIAGNOSIS — M62.838 LEVATOR SPASM: ICD-10-CM

## 2022-04-18 DIAGNOSIS — M79.18 MYALGIA OF PELVIC FLOOR: Primary | ICD-10-CM

## 2022-04-25 ENCOUNTER — TELEPHONE (OUTPATIENT)
Dept: FAMILY MEDICINE CLINIC | Facility: CLINIC | Age: 47
End: 2022-04-25

## 2022-04-25 DIAGNOSIS — Z98.890 S/P LYMPH NODE BIOPSY: Primary | ICD-10-CM

## 2022-04-25 NOTE — TELEPHONE ENCOUNTER
Pt called wanted a referral to Dr. Hector Redd at [facility]. She stated this was discussed at her last visit on 3/18/22.

## 2022-05-09 NOTE — PROGRESS NOTES
HISTORY OF PRESENT ILLNESS:    Ms. Johana Coronado is a 40 y.o. white female who is being referred for soreness under armpit with some redness at times. She was initially seen back in 2015 and underwent excision of a mass of the left axilla. Pathology demonstrates two benign lymph nodes displaying a mild reactive follicular hyperplasia and unremarkable fibroadipose tissue. She says the area thematous area is not generally painful but she does have some pain in her axilla with strenuous exercise. She also says that erythema comes and goes but she is not certain of a definite trigger. She has no family history of breast cancer     She is premenopausal.    BREAST EXAM:    Bakari Wade  has unremarkable fibrocystic changes throughout both breasts. There are no dominant masses, no skin or nipple changes and no axillary adenopathy. I see nothing suspicious on physical examination. On the caudal aspect of her old axillary incision there is an area of mild erythema that is not particularly tender and not particularly swollen. There is no axillary adenopathy. She does have more depth to her axilla after her surgery compared to the other side and there may be some mechanical factors associated with this redness. IMPRESSION: Erythema left axilla    PLAN: I will see her back for exam in 2-months   She will change her deodorant/do without. We will also try some clotrimazole cream on this area and see if it is a fungal skin infection. Is also going to keep a diary of what she was doing before it becomes red. We will discuss this much further in 2 months    I have seen, examined and reviewed this patient medication list, appropriate labs and imaging studies. I reviewed relevant medical records and others physicians notes. I discussed the plans of care with the patient. I answered all the questions to the patients satisfaction.   I, Dr Kika Nicole, personally performed the services described in this documentation as scribed by Shavon Bingham MA in my presence and is both accurate and complete. (Please note that portions of this note were completed with a voice recognition program. Efforts were made to edit the dictations but occasionally words are mis-transcribed.)  Over 50% of the total visit time of 45 minutes in face to face encounter with the patient, out of which more than 50% of the time was spent in counseling patient or family and coordination of care. Counseling included but was not limited to time spent reviewing labs, imaging studies/ treatment plan and answering questions.

## 2022-05-11 ENCOUNTER — OFFICE VISIT (OUTPATIENT)
Dept: SURGERY | Age: 47
End: 2022-05-11
Payer: COMMERCIAL

## 2022-05-11 VITALS
HEIGHT: 70 IN | SYSTOLIC BLOOD PRESSURE: 132 MMHG | DIASTOLIC BLOOD PRESSURE: 88 MMHG | BODY MASS INDEX: 25.48 KG/M2 | HEART RATE: 88 BPM | WEIGHT: 178 LBS

## 2022-05-11 DIAGNOSIS — L53.9 ERYTHEMA OF BREAST: ICD-10-CM

## 2022-05-11 PROCEDURE — 99204 OFFICE O/P NEW MOD 45 MIN: CPT | Performed by: SURGERY

## 2022-05-12 PROBLEM — L53.9 ERYTHEMA OF BREAST: Status: ACTIVE | Noted: 2022-05-12

## 2022-05-16 ENCOUNTER — TREATMENT (OUTPATIENT)
Dept: PHYSICAL THERAPY | Facility: CLINIC | Age: 47
End: 2022-05-16

## 2022-05-16 DIAGNOSIS — R30.0 DYSURIA: ICD-10-CM

## 2022-05-16 DIAGNOSIS — R35.0 FREQUENCY OF MICTURITION: Primary | ICD-10-CM

## 2022-05-16 DIAGNOSIS — N94.10 DYSPAREUNIA, FEMALE: ICD-10-CM

## 2022-05-16 DIAGNOSIS — R39.15 URINARY URGENCY: ICD-10-CM

## 2022-05-16 DIAGNOSIS — N39.3 SUI (STRESS URINARY INCONTINENCE, FEMALE): ICD-10-CM

## 2022-05-16 PROCEDURE — 97110 THERAPEUTIC EXERCISES: CPT | Performed by: PHYSICAL THERAPIST

## 2022-05-16 PROCEDURE — 97162 PT EVAL MOD COMPLEX 30 MIN: CPT | Performed by: PHYSICAL THERAPIST

## 2022-05-16 PROCEDURE — 97140 MANUAL THERAPY 1/> REGIONS: CPT | Performed by: PHYSICAL THERAPIST

## 2022-05-16 NOTE — PROGRESS NOTES
Physical Therapy Initial Evaluation and Plan of Care    Patient: Natalie Lawrence              : 1975  Today's Date: 2022  Referring practitioner: Hector Barrios MD  Date of Initial Visit: 2022  Patient seen for 1 sessions    Visit Diagnoses:    ICD-10-CM ICD-9-CM   1. Frequency of micturition  R35.0 788.41   2. Dysuria  R30.0 788.1   3. Urinary urgency  R39.15 788.63   4. Dyspareunia, female  N94.10 625.0   5. MAR (stress urinary incontinence, female)  N39.3 625.6     Past Medical History:   Diagnosis Date   • Anxiety    • Macular degeneration    • Urinary tract infection      Past Surgical History:   Procedure Laterality Date   •  SECTION     • DILATATION AND CURETTAGE     • LYMPH NODE BIOPSY         SUBJECTIVE     Subjective   She went to Dr. Barrios for f/u . She did a voiding schedule, changed diet and soaps. She reports some improvements. They still think she has some issues with IC, she had a flare with drinking lemonade. Her biggest complaint is with intercourse and unable to orgasm without increase pain. Kegels make it more aggravating. She will have MAR with a hamilton bladder. She is on her period currently.     Aggravating factors include urination and intercourse/sexual activity.   Bowel habits include BM's once a day. Difficulties with bowel include none  Bladder habits include a daytime frequency of able to go every 3-4 horus and nighttime frequency of 1. Bladder difficulties include leakage.  Sexual activity include currently sexually active.         Subjective      Chief Complaint:   Chief Complaint   Patient presents with   • Initial Evaluation      Birth HX (#, when, type of delivery, complications):  (son going into 4th grade at [employer])        OBJECTIVE     Objective   Objective            Passive Hip ROM    Hip IR  Hip ER   Right 19 Right 40   Left 20 Left 44     Hip Abduction Strength (Glut Med): R 4- L  4-       Therapy Education/Self Care 64387   Given Home Exercise Program  [employer] HEP (access code [email or web address])   Progress: New   Education provided to:  Patient   Level of understanding Verbalized and Demonstrated   Timed Minutes      Therapeutic Exercises    74573 Comments   Happy baby    breathing    Adductor stretch in HL    Seated piriformis stretch        Timed Minutes 10     Manual Therapy     81354  Comments   HL hip mobilizations with belt Inferior/lateral   HL LAD     HL STM with white foam to hip flexors and adductors            Timed Minutes 10        Total Timed Treatment:     20   mins  Total Time of Visit:            50   mins    ASSESSMENT/PLAN        Goals                                                                       Progress Note due by 6/15/22                                                                                    Re-cert due by 8/13/22   STG by:6 weeks Comments Status   Pt will demonstrated 45 degrees or greater (B) hip ER passively.       Pt will be independent with initial HEP, in order to accelerate progress.       Pt will score 1-2/3 on Marinoff scale.              LTG by: 12 weeks      Pt will be independent with HEP including core, hip and PF strengthening.       Pt will demonstrate 4/5 or greater hip abduction strength.        Patient will demonstrate a 4+/5 grade strength of levator ani muscles bilaterally         Pt will score 0-1/3 on Marinoff scale.       Pt will be able to cough without UI.          Assessment & Plan     Assessment  Impairments: abnormal or restricted ROM, activity intolerance, impaired physical strength and pain with function  Functional Limitations: uncomfortable because of pain  Assessment details: Natalie presents with ongoing pelvic pain with intercourse and IC symptoms. She has overall had good success with change in diet, voiding patterns, soaps and therapy, she just has ongoing pain with intercourse during and post as well as occassional MAR.  Upon assessment today she had decreased rib expansion with breathing, poor hip/core stability, decreased hip PROM, all placing increased tension on pelvic floor and causing pain with activity. There was not an internal assessment today secondary to patient being on period. Skilled PT is needed to address soft tissue and joint restrictions and progress towards independent HEP. She is very motivated and I believe she will progress very well with physical therapy. Thank you for the referral.     Prognosis: good    Plan  Therapy options: will be seen for skilled therapy services  Planned modality interventions: TENS and dry needling  Planned therapy interventions: manual therapy, motor coordination training, neuromuscular re-education, postural training, soft tissue mobilization, spinal/joint mobilization, strengthening, stretching, therapeutic activities, IADL retraining, home exercise program, gait training, functional ROM exercises, ADL retraining, abdominal trunk stabilization and body mechanics training  Frequency: 1x week  Duration in weeks: 12  Treatment plan discussed with: patient  Plan details: We will initially work on hip mobility and surrounding soft tissue restrictions. Next we will perform internal pelvic floor assessment and proceed as needed.  will then progress with internal pelvic floor work. We will use sEMG/biofeedback for pelvic floor relaxation and strengthening. We will then progress with core/hip strengthening and towards independent HEP.           SIGNATURE: Margaret Sam, PT DPT, [place] License #: [id number]  Electronically Signed on 5/16/2022    Initial Certification  Certification Period: 5/16/2022 through 8/13/2022  I certify that the therapy services are furnished while this patient is under my care.  The services outlined above are required by this patient, and will be reviewed every 90 days.     PHYSICIAN: Hector Barrios MD (NPI:  [id number])    Signature:________________________________________DATE: _________    Please sign and return via fax to [phone].   Thank you so much for letting us work with Natalie. I appreciate your letting us work with your patients. If you have any questions or concerns, please don't hesitate to contact me.          [address] [phone]  [phone]

## 2022-05-27 ENCOUNTER — TREATMENT (OUTPATIENT)
Dept: PHYSICAL THERAPY | Facility: CLINIC | Age: 47
End: 2022-05-27

## 2022-05-27 DIAGNOSIS — N94.10 DYSPAREUNIA, FEMALE: ICD-10-CM

## 2022-05-27 DIAGNOSIS — R35.0 FREQUENCY OF MICTURITION: Primary | ICD-10-CM

## 2022-05-27 DIAGNOSIS — R39.15 URINARY URGENCY: ICD-10-CM

## 2022-05-27 DIAGNOSIS — N39.3 SUI (STRESS URINARY INCONTINENCE, FEMALE): ICD-10-CM

## 2022-05-27 DIAGNOSIS — R30.0 DYSURIA: ICD-10-CM

## 2022-05-27 PROCEDURE — 97110 THERAPEUTIC EXERCISES: CPT | Performed by: PHYSICAL THERAPIST

## 2022-05-27 PROCEDURE — 97140 MANUAL THERAPY 1/> REGIONS: CPT | Performed by: PHYSICAL THERAPIST

## 2022-05-27 NOTE — PROGRESS NOTES
Physical Therapy Treatment Note    Patient: Natalie Lawrence                                                                     Visit Date: 2022  :     1975    Referring practitioner:    Hector Barrios MD  Date of Initial Visit:          Type: THERAPY  Noted: 2022    Patient seen for 2 sessions    Visit Diagnoses:    ICD-10-CM ICD-9-CM   1. Frequency of micturition  R35.0 788.41   2. Dysuria  R30.0 788.1   3. Urinary urgency  R39.15 788.63   4. Dyspareunia, female  N94.10 625.0   5. MAR (stress urinary incontinence, female)  N39.3 625.6     SUBJECTIVE     Subjective She is overall better, it tends to come and go. She has some days that are better than others. She reports the week prior to her period was really good, but sometimes her cycle will flare.              OBJECTIVE     Objective      Manual Therapy     08660  Comments   Prone STM with foam to thoracolumbar hci and gluts    Prone hip IR/ER mobilizations with oscillations    Prone LS distraction    Prone external PF MFR    HL LAD (B)        HL hip mobilizations with belt (B)    Timed Minutes 30     Therapeutic Exercises    73555 Comments   (B) heel slides on SB in HL X 3, pt felt in lower back    Small range LTR on SB in HL  X 20   Marches on ball in HL  X 20   SL heel slides on table in HL  X 10 each       Timed Minutes 15     Therapy Education/Self Care 84114   Details:    Date last updated:    Given Home Exercise Program  symptom relief   Progress: Reinforced   Education provided to:  Patient   Level of understanding Verbalized   Timed Minutes        Total Timed Treatment:     45  mins  Total Time of Visit:             50   mins         ASSESSMENT/PLAN     GOALS  Goals                                                                       Progress Note due by 6/15/22                                                                                    Re-cert due by 22    STG by:6 weeks Comments Status   Pt will demonstrated 45 degrees or greater (B) hip ER passively.   40 (B)  ongoing   Pt will be independent with initial HEP, in order to accelerate progress.     met   Pt will score 1-2/3 on Marinoff scale.                LTG by: 12 weeks       Pt will be independent with HEP including core, hip and PF strengthening.        Pt will demonstrate 4/5 or greater hip abduction strength.         Patient will demonstrate a 4+/5 grade strength of levator ani muscles bilaterally          Pt will score 0-1/3 on Marinoff scale.        Pt will be able to cough without UI.                 Assessment/Plan     ASSESSMENT: Today was her first day since initial evaluation. She felt a good stretch with IR/ER on her Right let today. Both hips are still stiff.    PLAN: Internal pelvic floor exam next time. Continue to work on pelvic floor relaxation and breathing mechanics.     SIGNATURE: Margaret Sam, PT DPT, [place] License #: [id number]  Electronically Signed on 5/27/2022        [address] [place] [address] [place] [address]  [phone]

## 2022-06-28 ENCOUNTER — TREATMENT (OUTPATIENT)
Dept: PHYSICAL THERAPY | Facility: CLINIC | Age: 47
End: 2022-06-28

## 2022-06-28 DIAGNOSIS — R39.15 URINARY URGENCY: ICD-10-CM

## 2022-06-28 DIAGNOSIS — R30.0 DYSURIA: ICD-10-CM

## 2022-06-28 DIAGNOSIS — N39.3 SUI (STRESS URINARY INCONTINENCE, FEMALE): ICD-10-CM

## 2022-06-28 DIAGNOSIS — N94.10 DYSPAREUNIA, FEMALE: ICD-10-CM

## 2022-06-28 DIAGNOSIS — R35.0 FREQUENCY OF MICTURITION: Primary | ICD-10-CM

## 2022-06-28 PROCEDURE — 97110 THERAPEUTIC EXERCISES: CPT | Performed by: PHYSICAL THERAPIST

## 2022-06-28 PROCEDURE — 97140 MANUAL THERAPY 1/> REGIONS: CPT | Performed by: PHYSICAL THERAPIST

## 2022-06-28 NOTE — PROGRESS NOTES
Physical Therapy Treatment Note and 30 Day Progress Note    Patient: Natalie Lawrence                                                                     Visit Date: 2022  :     1975    Referring practitioner:    Hector Barrios MD  Date of Initial Visit:          Type: THERAPY  Noted: 2022    Patient seen for 3 sessions    Visit Diagnoses:    ICD-10-CM ICD-9-CM   1. Frequency of micturition  R35.0 788.41   2. Dysuria  R30.0 788.1   3. Urinary urgency  R39.15 788.63   4. Dyspareunia, female  N94.10 625.0   5. MAR (stress urinary incontinence, female)  N39.3 625.6     SUBJECTIVE     Subjective When she traveled she had to take medication, but has been better for 10 days with very little symptoms. Started period yesterday, so flare would have been 2 weeks prior to cycle starting. She did eat different things. Takes Azo after intercourse just in case.          OBJECTIVE     Objective      Manual Therapy     51589  Comments   Prone STM with foam to thoracolumbar chi and gluts    Prone hip IR/ER mobilizations with oscillations    Prone LS distraction    Prone external PF MFR    HL LAD (B)        HL hip mobilizations with belt (B)    Timed Minutes 30     Therapeutic Exercises    89770 Comments   Standing fire hydrants    Standing plank march    Updated/reviewed HEP    Hip ER:  L 50 R 48    Hip abduction: R 4/5 L 4-/5    Timed Minutes 15     Therapy Education/Self Care 43183   Details: [id number]   Given Home Exercise Program  symptom relief   Progress: Reinforced   Education provided to:  Patient   Level of understanding Verbalized   Timed Minutes        Total Timed Treatment:     45  mins  Total Time of Visit:             50   mins         ASSESSMENT/PLAN     GOALS  Goals                                                                       Progress Note due by 22                                                                                     Re-cert due by 8/13/22   STG by:6 weeks Comments Status   Pt will demonstrated 45 degrees or greater (B) hip ER passively.   L 50 R 48 met   Pt will be independent with initial HEP, in order to accelerate progress.     met   Pt will score 1-2/3 on Marinoff scale.   improving ongoing           LTG by: 12 weeks       Pt will be independent with HEP including core, hip and PF strengthening.   progressing ongoing   Pt will demonstrate 4/5 or greater hip abduction strength.    R 4/5 L 4-/5  partially met   Patient will demonstrate a 4+/5 grade strength of levator ani muscles bilaterally    did not assess  ongoing    Pt will score 0-1/3 on Marinoff scale.  improving, taking medication after ongoing    Pt will be able to cough without UI.   not consistently- very rare met     Assessment & Plan     Assessment  Impairments: abnormal or restricted ROM, activity intolerance, impaired physical strength and pain with function  Functional Limitations: uncomfortable because of pain  Assessment details:     Prognosis: good    Plan  Therapy options: will be seen for skilled therapy services  Planned modality interventions: TENS and dry needling  Planned therapy interventions: manual therapy, motor coordination training, neuromuscular re-education, postural training, soft tissue mobilization, spinal/joint mobilization, strengthening, stretching, therapeutic activities, IADL retraining, home exercise program, gait training, functional ROM exercises, ADL retraining, abdominal trunk stabilization and body mechanics training  Frequency: 1x week  Duration in weeks: 12  Treatment plan discussed with: patient         ASSESSMENT: Today was 2nd treatment session since evaluation. We have been having scheduling issues. She does report improvements compared to last year, but ongoing intermittent use of medication for symptoms. Her hip ROM and strength improved today.     PLAN: Internal pelvic floor exam next time. Continue to work on  pelvic floor relaxation and breathing mechanics.     SIGNATURE: Margaret Sam, PT DPT, [place] License #: [id number]  Electronically Signed on 6/28/2022        [address] [place] [address]  [phone]

## 2022-07-07 ENCOUNTER — TREATMENT (OUTPATIENT)
Dept: PHYSICAL THERAPY | Facility: CLINIC | Age: 47
End: 2022-07-07

## 2022-07-07 DIAGNOSIS — R39.15 URINARY URGENCY: ICD-10-CM

## 2022-07-07 DIAGNOSIS — R30.0 DYSURIA: ICD-10-CM

## 2022-07-07 DIAGNOSIS — N94.10 DYSPAREUNIA, FEMALE: ICD-10-CM

## 2022-07-07 DIAGNOSIS — N39.3 SUI (STRESS URINARY INCONTINENCE, FEMALE): ICD-10-CM

## 2022-07-07 DIAGNOSIS — R35.0 FREQUENCY OF MICTURITION: Primary | ICD-10-CM

## 2022-07-07 PROCEDURE — 97110 THERAPEUTIC EXERCISES: CPT | Performed by: PHYSICAL THERAPIST

## 2022-07-07 PROCEDURE — 97140 MANUAL THERAPY 1/> REGIONS: CPT | Performed by: PHYSICAL THERAPIST

## 2022-07-07 NOTE — PROGRESS NOTES
Physical Therapy Treatment Note    Patient: Natalie Lawrence                                                                     Visit Date: 2022  :     1975    Referring practitioner:    Hector Barrios MD  Date of Initial Visit:          Type: THERAPY  Noted: 2022    Patient seen for 4 sessions    Visit Diagnoses:    ICD-10-CM ICD-9-CM   1. Frequency of micturition  R35.0 788.41   2. Dysuria  R30.0 788.1   3. Urinary urgency  R39.15 788.63   4. Dyspareunia, female  N94.10 625.0   5. MAR (stress urinary incontinence, female)  N39.3 625.6     SUBJECTIVE     Subjective She's been doing her HEP. Reports 8-9 hours after intercourse had symptoms, but had one time without any at all.           OBJECTIVE     Objective      Manual Therapy     23230  Comments   Prone STM with foam to thoracolumbar chi and gluts    Prone hip IR/ER mobilizations with oscillations    Prone LS distraction    Prone external PF MFR                Timed Minutes 25      / 1100   Pelvic Floor Muscle   Patient/Parent/Guardian Consented to Internal Pelvic Floor Exam Yes   Strength (Right) 3: Squeeze with/without lift   Strength (Left) 3: Squeeze with/without lift   Internal Pelvic Floor Comments pt had slight tenderness note on L compared to R. No significant muscle guarding felt; she did have decreased abiltiy to relax after contraction     Therapeutic Exercises    22590 Comments   See PFM assessment above    HEP/stretching activities after intercourse                Timed Minutes 15     Therapy Education/Self Care 00454   Details: [id number]   Given Home Exercise Program  symptom relief   Progress: Reinforced   Education provided to:  Patient   Level of understanding Verbalized   Timed Minutes        Total Timed Treatment:     40  mins  Total Time of Visit:             45   mins         ASSESSMENT/PLAN     GOALS  Goals                                                                        Progress Note due by 7/28/22                                                                                    Re-cert due by 8/13/22   STG by:6 weeks Comments Status   Pt will demonstrated 45 degrees or greater (B) hip ER passively.   L 50 R 48 met   Pt will be independent with initial HEP, in order to accelerate progress.     met   Pt will score 1-2/3 on Marinoff scale.    met           LTG by: 12 weeks       Pt will be independent with HEP including core, hip and PF strengthening.   progressing ongoing   Pt will demonstrate 4/5 or greater hip abduction strength.    R 4/5 L 4-/5  partially met   Patient will demonstrate a 4+/5 grade strength of levator ani muscles bilaterally    did not assess  ongoing    Pt will score 0-1/3 on Marinoff scale. One episode without, one episode after 8-9 hours needed medication ongoing    Pt will be able to cough without UI.   not consistently- very rare met     Assessment/Plan     ASSESSMENT: She had tenderness on the L side of her pelvic floor, but no large muscle guarding. Educated on stretching at home and pelvic floor relaxation.     PLAN: follow up in approximately one month. Will readdress pelvic floor and symptoms.     SIGNATURE: Margaret Sam, PT DPT, [place] License #: [id number]  Electronically Signed on 7/7/2022        [address]  [place] [address]  [phone]

## 2022-08-09 ENCOUNTER — TELEPHONE (OUTPATIENT)
Dept: PHYSICAL THERAPY | Facility: CLINIC | Age: 47
End: 2022-08-09

## 2022-08-22 ENCOUNTER — OFFICE VISIT (OUTPATIENT)
Dept: OBSTETRICS AND GYNECOLOGY | Facility: CLINIC | Age: 47
End: 2022-08-22

## 2022-08-22 VITALS
WEIGHT: 180 LBS | HEIGHT: 70 IN | SYSTOLIC BLOOD PRESSURE: 152 MMHG | DIASTOLIC BLOOD PRESSURE: 90 MMHG | BODY MASS INDEX: 25.77 KG/M2

## 2022-08-22 DIAGNOSIS — E55.9 VITAMIN D DEFICIENCY: ICD-10-CM

## 2022-08-22 DIAGNOSIS — Z01.419 WELL WOMAN EXAM WITH ROUTINE GYNECOLOGICAL EXAM: Primary | ICD-10-CM

## 2022-08-22 DIAGNOSIS — Z12.31 ENCOUNTER FOR SCREENING MAMMOGRAM FOR BREAST CANCER: ICD-10-CM

## 2022-08-22 DIAGNOSIS — N30.10 INTERSTITIAL CYSTITIS: ICD-10-CM

## 2022-08-22 DIAGNOSIS — F41.9 ANXIETY: ICD-10-CM

## 2022-08-22 PROCEDURE — 87624 HPV HI-RISK TYP POOLED RSLT: CPT | Performed by: NURSE PRACTITIONER

## 2022-08-22 PROCEDURE — G0123 SCREEN CERV/VAG THIN LAYER: HCPCS | Performed by: NURSE PRACTITIONER

## 2022-08-22 PROCEDURE — 99396 PREV VISIT EST AGE 40-64: CPT | Performed by: NURSE PRACTITIONER

## 2022-08-22 RX ORDER — ALPRAZOLAM 0.25 MG/1
0.25 TABLET ORAL 3 TIMES DAILY PRN
Qty: 90 TABLET | Refills: 0 | Status: SHIPPED | OUTPATIENT
Start: 2022-08-22

## 2022-08-22 NOTE — PROGRESS NOTES
"Subjective     Natalie Lawrence is a 47 y.o. female    Patient is here today for yearly checkup.  She is still having some problems with her bladder.  States she is better than she was last year.  She has been to urology in [place] and has been watching her diet and that has helped with her IC.    Gynecologic Exam  The patient's pertinent negatives include no pelvic pain, vaginal bleeding or vaginal discharge. The patient is experiencing no pain. Associated symptoms include frequency and urgency. Pertinent negatives include no abdominal pain, anorexia, back pain, chills, constipation, diarrhea, discolored urine, dysuria, fever, flank pain, headaches, hematuria, joint pain, joint swelling, nausea, painful intercourse, rash, sore throat or vomiting. She is sexually active. She uses condoms for contraception. Her menstrual history has been regular.         /90   Ht 177.8 cm (70\")   Wt 81.6 kg (180 lb)   LMP 08/11/2022 (Exact Date)   BMI 25.83 kg/m²     Outpatient Encounter Medications as of 8/22/2022   Medication Sig Dispense Refill   • ALPRAZolam (XANAX) 0.25 MG tablet Take 1 tablet by mouth 3 (Three) Times a Day As Needed for Anxiety. 90 tablet 0   • cholecalciferol (VITAMIN D3) 1000 UNITS tablet Take 5,000 Units by mouth Daily.     • Cholecalciferol 125 MCG (5000 UT) tablet Take  by mouth.     • Multiple Vitamins-Minerals (DAILY VITAMIN FORMULA+MINERALS PO) Take  by mouth.     • NON FORMULARY Daily. OTC eye vitamin     • Ejkkpkfcsbst-Loiwgxc-Z-Probiot (AZO URINARY TRACT SUPPORT PO) Take  by mouth.     • [DISCONTINUED] ALPRAZolam (XANAX) 0.25 MG tablet Take 1 tablet by mouth 3 (Three) Times a Day As Needed for Anxiety. 90 tablet 0   • [DISCONTINUED] phenazopyridine (Pyridium) 200 MG tablet Take 1 tablet by mouth 3 (Three) Times a Day As Needed for Bladder Spasms. 6 tablet 0     No facility-administered encounter medications on file as of 8/22/2022.       Surgical History  Past Surgical History:   Procedure " Laterality Date   •  SECTION     • CYSTOSCOPY     • DILATATION AND CURETTAGE     • LYMPH NODE BIOPSY         Family History  Family History   Problem Relation Age of Onset   • Heart disease Father    • Diabetes Father    • Hypertension Father    • Prostate cancer Father 60   • No Known Problems Mother    • Stroke Paternal Grandfather    • No Known Problems Son    • Breast cancer Neg Hx    • Ovarian cancer Neg Hx    • Uterine cancer Neg Hx    • Colon cancer Neg Hx    • Melanoma Neg Hx        The following portions of the patient's history were reviewed and updated as appropriate: allergies, current medications, past family history, past medical history, past social history, past surgical history, and problem list.    Review of Systems   Constitutional: Negative for activity change, appetite change, chills, diaphoresis, fatigue, fever, unexpected weight gain and unexpected weight loss.   HENT: Negative for congestion, dental problem, drooling, ear discharge, ear pain, facial swelling, hearing loss, mouth sores, nosebleeds, postnasal drip, rhinorrhea, sinus pressure, sneezing, sore throat, swollen glands, tinnitus, trouble swallowing and voice change.    Eyes: Negative for blurred vision, double vision, photophobia, pain, discharge, redness, itching and visual disturbance.   Respiratory: Negative for apnea, cough, choking, chest tightness, shortness of breath, wheezing and stridor.    Cardiovascular: Negative for chest pain, palpitations and leg swelling.   Gastrointestinal: Negative for abdominal distention, abdominal pain, anal bleeding, anorexia, blood in stool, constipation, diarrhea, nausea, rectal pain, vomiting, GERD and indigestion.   Endocrine: Negative for cold intolerance, heat intolerance, polydipsia, polyphagia and polyuria.   Genitourinary: Positive for frequency and urgency. Negative for amenorrhea, breast discharge, breast lump, breast pain, decreased libido, decreased urine volume, difficulty  urinating, dyspareunia, dysuria, flank pain, genital sores, hematuria, menstrual problem, pelvic pain, pelvic pressure, urinary incontinence, vaginal bleeding, vaginal discharge and vaginal pain.   Musculoskeletal: Negative for arthralgias, back pain, gait problem, joint pain, joint swelling, myalgias, neck pain, neck stiffness and bursitis.   Skin: Negative for color change, dry skin and rash.   Allergic/Immunologic: Negative for environmental allergies, food allergies and immunocompromised state.   Neurological: Negative for dizziness, tremors, seizures, syncope, facial asymmetry, speech difficulty, weakness, light-headedness, numbness, headache, memory problem and confusion.   Hematological: Negative for adenopathy. Does not bruise/bleed easily.   Psychiatric/Behavioral: Negative for agitation, behavioral problems, decreased concentration, dysphoric mood, hallucinations, self-injury, sleep disturbance, suicidal ideas, negative for hyperactivity, depressed mood and stress. The patient is not nervous/anxious.        Objective   Physical Exam  Vitals and nursing note reviewed. Exam conducted with a chaperone present.   Constitutional:       General: She is not in acute distress.     Appearance: She is well-developed. She is not diaphoretic.   HENT:      Head: Normocephalic.      Right Ear: External ear normal.      Left Ear: External ear normal.      Nose: Nose normal.   Eyes:      General: No scleral icterus.        Right eye: No discharge.         Left eye: No discharge.      Conjunctiva/sclera: Conjunctivae normal.      Pupils: Pupils are equal, round, and reactive to light.   Neck:      Thyroid: No thyromegaly.      Vascular: No carotid bruit.      Trachea: No tracheal deviation.   Cardiovascular:      Rate and Rhythm: Normal rate and regular rhythm.      Heart sounds: Normal heart sounds. No murmur heard.  Pulmonary:      Effort: Pulmonary effort is normal. No respiratory distress.      Breath sounds: Normal  breath sounds. No wheezing.   Chest:   Breasts: Breasts are symmetrical.      Right: Normal. No swelling, bleeding, inverted nipple, mass, nipple discharge, skin change, tenderness, axillary adenopathy or supraclavicular adenopathy.      Left: Normal. No swelling, bleeding, inverted nipple, mass, nipple discharge, skin change, tenderness, axillary adenopathy or supraclavicular adenopathy.       Abdominal:      General: There is no distension.      Palpations: Abdomen is soft. There is no mass.      Tenderness: There is no abdominal tenderness. There is no right CVA tenderness, left CVA tenderness or guarding.      Hernia: No hernia is present. There is no hernia in the left inguinal area or right inguinal area.   Genitourinary:     General: Normal vulva.      Exam position: Lithotomy position.      Labia:         Right: No rash, tenderness, lesion or injury.         Left: No rash, tenderness, lesion or injury.       Vagina: Normal. No signs of injury and foreign body. No vaginal discharge, erythema, tenderness or bleeding.      Cervix: Normal.      Uterus: Normal. Not enlarged, not fixed and not tender.       Adnexa: Right adnexa normal and left adnexa normal.        Right: No mass, tenderness or fullness.          Left: No mass, tenderness or fullness.        Rectum: Normal. No mass.      Comments:   BSU normal  Urethral meatus  Normal  Perineum  Normal  Musculoskeletal:         General: No tenderness. Normal range of motion.      Cervical back: Normal range of motion and neck supple.   Lymphadenopathy:      Head:      Right side of head: No submental, submandibular, tonsillar, preauricular, posterior auricular or occipital adenopathy.      Left side of head: No submental, submandibular, tonsillar, preauricular, posterior auricular or occipital adenopathy.      Cervical: No cervical adenopathy.      Right cervical: No superficial, deep or posterior cervical adenopathy.     Left cervical: No superficial, deep or  posterior cervical adenopathy.      Upper Body:      Right upper body: No supraclavicular, axillary or pectoral adenopathy.      Left upper body: No supraclavicular, axillary or pectoral adenopathy.      Lower Body: No right inguinal adenopathy. No left inguinal adenopathy.   Skin:     General: Skin is warm and dry.      Findings: No bruising, erythema or rash.   Neurological:      Mental Status: She is alert and oriented to person, place, and time.      Coordination: Coordination normal.   Psychiatric:         Mood and Affect: Mood normal.         Behavior: Behavior normal.         Thought Content: Thought content normal.         Judgment: Judgment normal.         Assessment & Plan   Diagnoses and all orders for this visit:    1. Well woman exam with routine gynecological exam (Primary)  Normal GYN exam. Will have lab work. Encouraged SBE, pt is aware how to do self breast exam and the importance of same. Discussed weight management and importance of maintaining a healthy weight. Discussed Vitamin D intake and the importance of adequate vitamin D for both Bone Health and a healthy immune system.  Discussed Daily exercise and the importance of same, in regards to a healthy heart as well as helping to maintain her weight and improving her mental health.  BMI 25.8.  Colonoscopy will be scheduled with gastro.  Mammogram was already done and was normal.  Pap smear is done per [facility] guidelines.  -     Liquid-based Pap Smear, Screening  -     CBC & Differential  -     Comprehensive Metabolic Panel  -     Lipid Panel With LDL / HDL Ratio  -     TSH  -     T3, Uptake  -     T4, Free  -     Hemoglobin A1c  -     Urine Culture - , Urine, Clean Catch  -     UA / M With / Rflx Culture([facility] ONLY) - Urine, Clean Catch  -     Hepatitis C Antibody    2. Encounter for screening mammogram for breast cancer  Comments:  Patient recently had a mammogram that was normal.    3. Anxiety  Comments:  Patient has had quite a bit of anxiety  due to her bladder issues.  She is given a refill on Xanax today.  Natanael is reviewed.  Orders:  -     ALPRAZolam (XANAX) 0.25 MG tablet; Take 1 tablet by mouth 3 (Three) Times a Day As Needed for Anxiety.  Dispense: 90 tablet; Refill: 0    4. Interstitial cystitis  Comments:  Patient has been to [place] to see urology regarding her IC.  She was afraid to take Elmiron as she has history of macular degeneration.  She has been watching her diet and states it has helped her IC.    5. Vitamin D deficiency  Comments:  Patient will have lab work drawn.  Orders:  -     Vitamin D 25 Hydroxy         BMI is >= 25 and <30. (Overweight) The following options were offered after discussion;: exercise counseling/recommendations and nutrition counseling/recommendations      Nancy Erwin, APRN  8/22/2022

## 2022-08-23 LAB
GEN CATEG CVX/VAG CYTO-IMP: NORMAL
HPV I/H RISK 4 DNA CVX QL PROBE+SIG AMP: NOT DETECTED
LAB AP CASE REPORT: NORMAL
LAB AP GYN ADDITIONAL INFORMATION: NORMAL
Lab: NORMAL
PATH INTERP SPEC-IMP: NORMAL
STAT OF ADQ CVX/VAG CYTO-IMP: NORMAL

## 2022-08-24 LAB
25(OH)D3+25(OH)D2 SERPL-MCNC: 56.5 NG/ML (ref 30–100)
ALBUMIN SERPL-MCNC: 4.1 G/DL (ref 3.5–5.2)
ALBUMIN/GLOB SERPL: 1.9 G/DL
ALP SERPL-CCNC: 52 U/L (ref 39–117)
ALT SERPL-CCNC: 19 U/L (ref 1–33)
APPEARANCE UR: CLEAR
AST SERPL-CCNC: 19 U/L (ref 1–32)
BACTERIA #/AREA URNS HPF: NORMAL /HPF
BACTERIA UR CULT: NO GROWTH
BACTERIA UR CULT: NORMAL
BASOPHILS # BLD AUTO: 0.02 10*3/MM3 (ref 0–0.2)
BASOPHILS NFR BLD AUTO: 0.5 % (ref 0–1.5)
BILIRUB SERPL-MCNC: 0.4 MG/DL (ref 0–1.2)
BILIRUB UR QL STRIP: NEGATIVE
BUN SERPL-MCNC: 9 MG/DL (ref 6–20)
BUN/CREAT SERPL: 13.6 (ref 7–25)
CALCIUM SERPL-MCNC: 8.8 MG/DL (ref 8.6–10.5)
CASTS URNS QL MICRO: NORMAL /LPF
CHLORIDE SERPL-SCNC: 103 MMOL/L (ref 98–107)
CHOLEST SERPL-MCNC: 140 MG/DL (ref 0–200)
CO2 SERPL-SCNC: 26 MMOL/L (ref 22–29)
COLOR UR: YELLOW
CREAT SERPL-MCNC: 0.66 MG/DL (ref 0.57–1)
EGFRCR-CYS SERPLBLD CKD-EPI 2021: 109.7 ML/MIN/1.73
EOSINOPHIL # BLD AUTO: 0.08 10*3/MM3 (ref 0–0.4)
EOSINOPHIL NFR BLD AUTO: 2.1 % (ref 0.3–6.2)
EPI CELLS #/AREA URNS HPF: NORMAL /HPF (ref 0–10)
ERYTHROCYTE [DISTWIDTH] IN BLOOD BY AUTOMATED COUNT: 12.4 % (ref 12.3–15.4)
GLOBULIN SER CALC-MCNC: 2.2 GM/DL
GLUCOSE SERPL-MCNC: 85 MG/DL (ref 65–99)
GLUCOSE UR QL STRIP: NEGATIVE
HBA1C MFR BLD: 5.4 % (ref 4.8–5.6)
HCT VFR BLD AUTO: 38.6 % (ref 34–46.6)
HCV AB S/CO SERPL IA: 0.1 S/CO RATIO (ref 0–0.9)
HDLC SERPL-MCNC: 47 MG/DL (ref 40–60)
HGB BLD-MCNC: 12.5 G/DL (ref 12–15.9)
HGB UR QL STRIP: NEGATIVE
IMM GRANULOCYTES # BLD AUTO: 0.01 10*3/MM3 (ref 0–0.05)
IMM GRANULOCYTES NFR BLD AUTO: 0.3 % (ref 0–0.5)
KETONES UR QL STRIP: NEGATIVE
LDLC SERPL CALC-MCNC: 83 MG/DL (ref 0–100)
LDLC/HDLC SERPL: 1.78 {RATIO}
LEUKOCYTE ESTERASE UR QL STRIP: NEGATIVE
LYMPHOCYTES # BLD AUTO: 1.22 10*3/MM3 (ref 0.7–3.1)
LYMPHOCYTES NFR BLD AUTO: 31.3 % (ref 19.6–45.3)
MCH RBC QN AUTO: 29.2 PG (ref 26.6–33)
MCHC RBC AUTO-ENTMCNC: 32.4 G/DL (ref 31.5–35.7)
MCV RBC AUTO: 90.2 FL (ref 79–97)
MICRO URNS: NORMAL
MICRO URNS: NORMAL
MONOCYTES # BLD AUTO: 0.33 10*3/MM3 (ref 0.1–0.9)
MONOCYTES NFR BLD AUTO: 8.5 % (ref 5–12)
NEUTROPHILS # BLD AUTO: 2.24 10*3/MM3 (ref 1.7–7)
NEUTROPHILS NFR BLD AUTO: 57.3 % (ref 42.7–76)
NITRITE UR QL STRIP: NEGATIVE
NRBC BLD AUTO-RTO: 0 /100 WBC (ref 0–0.2)
PH UR STRIP: 5.5 [PH] (ref 5–7.5)
PLATELET # BLD AUTO: 251 10*3/MM3 (ref 140–450)
POTASSIUM SERPL-SCNC: 4.3 MMOL/L (ref 3.5–5.2)
PROT SERPL-MCNC: 6.3 G/DL (ref 6–8.5)
PROT UR QL STRIP: NEGATIVE
RBC # BLD AUTO: 4.28 10*6/MM3 (ref 3.77–5.28)
RBC #/AREA URNS HPF: NORMAL /HPF (ref 0–2)
SODIUM SERPL-SCNC: 138 MMOL/L (ref 136–145)
SP GR UR STRIP: 1.01 (ref 1–1.03)
T3RU NFR SERPL: 29 % (ref 24–39)
T4 FREE SERPL-MCNC: 0.98 NG/DL (ref 0.93–1.7)
TRIGL SERPL-MCNC: 46 MG/DL (ref 0–150)
TSH SERPL DL<=0.005 MIU/L-ACNC: 1.92 UIU/ML (ref 0.27–4.2)
URINALYSIS REFLEX: NORMAL
UROBILINOGEN UR STRIP-MCNC: 0.2 MG/DL (ref 0.2–1)
VLDLC SERPL CALC-MCNC: 10 MG/DL (ref 5–40)
WBC # BLD AUTO: 3.9 10*3/MM3 (ref 3.4–10.8)
WBC #/AREA URNS HPF: NORMAL /HPF (ref 0–5)

## 2023-08-24 ENCOUNTER — OFFICE VISIT (OUTPATIENT)
Dept: OBSTETRICS AND GYNECOLOGY | Facility: CLINIC | Age: 48
End: 2023-08-24
Payer: COMMERCIAL

## 2023-08-24 VITALS
HEIGHT: 70 IN | WEIGHT: 176 LBS | BODY MASS INDEX: 25.2 KG/M2 | DIASTOLIC BLOOD PRESSURE: 80 MMHG | SYSTOLIC BLOOD PRESSURE: 130 MMHG

## 2023-08-24 DIAGNOSIS — Z12.31 ENCOUNTER FOR SCREENING MAMMOGRAM FOR BREAST CANCER: ICD-10-CM

## 2023-08-24 DIAGNOSIS — N89.8 VAGINAL DISCHARGE: ICD-10-CM

## 2023-08-24 DIAGNOSIS — E55.9 VITAMIN D DEFICIENCY: ICD-10-CM

## 2023-08-24 DIAGNOSIS — N30.10 INTERSTITIAL CYSTITIS: ICD-10-CM

## 2023-08-24 DIAGNOSIS — Z01.419 WELL WOMAN EXAM WITH ROUTINE GYNECOLOGICAL EXAM: Primary | ICD-10-CM

## 2023-08-24 DIAGNOSIS — H35.3111 EARLY DRY STAGE NONEXUDATIVE AGE-RELATED MACULAR DEGENERATION OF RIGHT EYE: ICD-10-CM

## 2023-08-24 DIAGNOSIS — F41.9 ANXIETY: ICD-10-CM

## 2023-08-24 PROBLEM — H35.30 MACULAR DEGENERATION: Status: ACTIVE | Noted: 2023-08-24

## 2023-08-24 PROCEDURE — G0123 SCREEN CERV/VAG THIN LAYER: HCPCS

## 2023-08-24 PROCEDURE — 87661 TRICHOMONAS VAGINALIS AMPLIF: CPT | Performed by: NURSE PRACTITIONER

## 2023-08-24 PROCEDURE — 87624 HPV HI-RISK TYP POOLED RSLT: CPT | Performed by: NURSE PRACTITIONER

## 2023-08-24 RX ORDER — ALPRAZOLAM 0.25 MG/1
0.25 TABLET ORAL 3 TIMES DAILY PRN
Qty: 90 TABLET | Refills: 0 | Status: SHIPPED | OUTPATIENT
Start: 2023-08-24

## 2023-08-24 NOTE — PATIENT INSTRUCTIONS
Health Maintenance, Female  Adopting a healthy lifestyle and getting preventive care are important in promoting health and wellness. Ask your health care provider about:  The right schedule for you to have regular tests and exams.  Things you can do on your own to prevent diseases and keep yourself healthy.  What should I know about diet, weight, and exercise?  Eat a healthy diet    Eat a diet that includes plenty of vegetables, fruits, low-fat dairy products, and lean protein.  Do not eat a lot of foods that are high in solid fats, added sugars, or sodium.    Maintain a healthy weight  Body mass index (BMI) is used to identify weight problems. It estimates body fat based on height and weight. Your health care provider can help determine your BMI and help you achieve or maintain a healthy weight.  Get regular exercise  Get regular exercise. This is one of the most important things you can do for your health. Most adults should:  Exercise for at least 150 minutes each week. The exercise should increase your heart rate and make you sweat (moderate-intensity exercise).  Do strengthening exercises at least twice a week. This is in addition to the moderate-intensity exercise.  Spend less time sitting. Even light physical activity can be beneficial.  Start  Vitamin D 5000IU per day. Vitamin D has been shown to improve your mood, help with fatigue and increase your immune system. A normal Vitamin D in women should be 50-70.  You should have your Vitamin D checked yearly  Watch cholesterol and blood lipids  Have your blood tested for lipids and cholesterol at 20 years of age, then have this test every 3 years.  Have your cholesterol levels checked more often if:  Your lipid or cholesterol levels are high.  You are older than 30 years of age.  You are at high risk for heart disease.  What should I know about cancer screening?  Depending on your health history and family history, you may need to have cancer screening at  various ages. This may include screening for:  Breast cancer.  Cervical cancer.  Colorectal cancer.  Skin cancer.  Lung cancer.  What should I know about heart disease, diabetes, and high blood pressure?  Blood pressure and heart disease  High blood pressure causes heart disease and increases the risk of stroke. This is more likely to develop in people who have high blood pressure readings, are of  descent, or are overweight.  Have your blood pressure checked:                  Every year.  Diabetes  Have regular diabetes screenings. This checks your fasting blood sugar level. Have the screening done:  Once every year after age 30 if you are at a normal weight and have a low risk for diabetes.  More often and at a younger age if you are overweight or have a high risk for diabetes.  What should I know about preventing infection?  Hepatitis B  If you have a higher risk for hepatitis B, you should be screened for this virus. Talk with your health care provider to find out if you are at risk for hepatitis B infection.  Hepatitis C  Testing is recommended for:  Everyone born from 1945 through 1965.  Anyone with known risk factors for hepatitis C.  Sexually transmitted infections (STIs)  Get screened for STIs, including gonorrhea and chlamydia, if:  You are sexually active and are younger than 24 years of age.  You are older than 24 years of age and your health care provider tells you that you are at risk for this type of infection.  Your sexual activity has changed since you were last screened, and you are at increased risk for chlamydia or gonorrhea. Ask your health care provider if you are at risk.  Ask your health care provider about whether you are at high risk for HIV. Your health care provider may recommend a prescription medicine to help prevent HIV infection. If you choose to take medicine to prevent HIV, you should first get tested for HIV. You should then be tested every 3 months for as long as you are  taking the medicine.  Pregnancy  If you are about to stop having your period (premenopausal) and you may become pregnant, seek counseling before you get pregnant.  Take 400 to 800 micrograms (mcg) of folic acid every day if you become pregnant.  Ask for birth control (contraception) if you want to prevent pregnancy.  Osteoporosis and menopause  Osteoporosis is a disease in which the bones lose minerals and strength with aging. This can result in bone fractures. If you are 55 years old or older, or if you are at risk for osteoporosis and fractures, ask your health care provider if you should:  Be screened for bone loss.  Take a calcium or vitamin D supplement to lower your risk of fractures.  Be given hormone replacement therapy (HRT) to treat symptoms of menopause.  Follow these instructions at home:  Lifestyle  Do not use any products that contain nicotine or tobacco, such as cigarettes, e-cigarettes, and chewing tobacco. If you need help quitting, ask your health care provider.  Do not use street drugs.  Do not share needles.  Ask your health care provider for help if you need support or information about quitting drugs.  Alcohol use  Do not drink alcohol if:  Your health care provider tells you not to drink.  You are pregnant, may be pregnant, or are planning to become pregnant.  If you drink alcohol:  Limit how much you use to 0-1 drink a day.  Limit intake if you are breastfeeding.  Be aware of how much alcohol is in your drink. In the [place] one drink equals one 12 oz bottle of beer (355 mL), one 5 oz glass of wine (148 mL), or one 1« oz glass of hard liquor (44 mL).  General instructions  Schedule regular health, dental, and eye exams.  Stay current with your vaccines.  Tell your health care provider if:  You often feel depressed.  You have ever been abused or do not feel safe at home.  Summary  Adopting a healthy lifestyle and getting preventive care are important in promoting health and wellness.  Follow  your health care provider's instructions about healthy diet, exercising, and getting tested or screened for diseases.  Follow your health care provider's instructions on monitoring your cholesterol and blood pressure.  This information is not intended to replace advice given to you by your health care provider. Make sure you discuss any questions you have with your health care provider.  Document Revised: 12/11/2019 Document Reviewed: 12/11/2019  [employer] Patient Education c 2021 [employer]

## 2023-08-24 NOTE — PROGRESS NOTES
"Subjective     Natalie Lawrence is a 48 y.o. female    History of Present Illness  Patient comes in today for yearly checkup.  She has complaint of vaginal discharge.  Gynecologic Exam  The patient's primary symptoms include vaginal discharge. The patient's pertinent negatives include no pelvic pain or vaginal bleeding. The patient is experiencing no pain. Pertinent negatives include no abdominal pain, back pain, chills, constipation, diarrhea, discolored urine, dysuria, fever, flank pain, frequency, hematuria, joint pain, joint swelling, nausea, painful intercourse, rash, sore throat, urgency or vomiting. The vaginal discharge was white. The vaginal bleeding is typical of menses. She has not been passing clots. She has not been passing tissue. Nothing aggravates the symptoms. She is sexually active. She uses condoms for contraception. Her menstrual history has been irregular.       /80   Ht 177.8 cm (70\")   Wt 79.8 kg (176 lb)   LMP 2023 (Exact Date)   BMI 25.25 kg/mý     Outpatient Encounter Medications as of 2023   Medication Sig Dispense Refill    ALPRAZolam (XANAX) 0.25 MG tablet Take 1 tablet by mouth 3 (Three) Times a Day As Needed for Anxiety. 90 tablet 0    cholecalciferol (VITAMIN D3) 1000 UNITS tablet Take 5 tablets by mouth Daily.      Multiple Vitamins-Minerals (DAILY VITAMIN FORMULA+MINERALS PO) Take  by mouth.      Qswgipdwyqxq-Pjhqlcy-C-Probiot (AZO URINARY TRACT SUPPORT PO) Take  by mouth.      [DISCONTINUED] ALPRAZolam (XANAX) 0.25 MG tablet Take 1 tablet by mouth 3 (Three) Times a Day As Needed for Anxiety. 90 tablet 0    [DISCONTINUED] Cholecalciferol 125 MCG (5000 UT) tablet Take  by mouth.      [DISCONTINUED] NON FORMULARY Daily. OTC eye vitamin       No facility-administered encounter medications on file as of 2023.       Surgical History  Past Surgical History:   Procedure Laterality Date     SECTION      CYSTOSCOPY      DILATATION AND CURETTAGE      LYMPH NODE " BIOPSY         Family History  Family History   Problem Relation Age of Onset    Heart disease Father     Diabetes Father     Hypertension Father     Prostate cancer Father 60    No Known Problems Mother     Stroke Paternal Grandfather     No Known Problems Son     Breast cancer Neg Hx     Ovarian cancer Neg Hx     Uterine cancer Neg Hx     Colon cancer Neg Hx     Melanoma Neg Hx        The following portions of the patient's history were reviewed and updated as appropriate: allergies, current medications, past family history, past medical history, past social history, past surgical history, and problem list.    Review of Systems   Constitutional:  Negative for activity change, appetite change, chills, diaphoresis, fatigue, fever, unexpected weight gain and unexpected weight loss.   HENT:  Negative for congestion, dental problem, drooling, ear discharge, ear pain, facial swelling, hearing loss, mouth sores, nosebleeds, postnasal drip, rhinorrhea, sinus pressure, sneezing, sore throat, swollen glands, tinnitus, trouble swallowing and voice change.    Eyes:  Negative for blurred vision, double vision, photophobia, pain, discharge, redness, itching and visual disturbance.   Respiratory:  Negative for apnea, cough, choking, chest tightness, shortness of breath, wheezing and stridor.    Cardiovascular:  Negative for chest pain, palpitations and leg swelling.   Gastrointestinal:  Negative for abdominal distention, abdominal pain, anal bleeding, blood in stool, constipation, diarrhea, nausea, rectal pain, vomiting, GERD and indigestion.   Endocrine: Negative for cold intolerance, heat intolerance, polydipsia, polyphagia and polyuria.   Genitourinary:  Positive for vaginal discharge. Negative for amenorrhea, breast discharge, breast lump, breast pain, decreased libido, decreased urine volume, difficulty urinating, dyspareunia, dysuria, flank pain, frequency, genital sores, hematuria, menstrual problem, pelvic pain, pelvic  pressure, urgency, urinary incontinence, vaginal bleeding and vaginal pain.   Musculoskeletal:  Negative for arthralgias, back pain, gait problem, joint pain, joint swelling, myalgias, neck pain, neck stiffness and bursitis.   Skin:  Negative for color change, dry skin and rash.   Allergic/Immunologic: Negative for environmental allergies, food allergies and immunocompromised state.   Neurological:  Negative for dizziness, tremors, seizures, syncope, facial asymmetry, speech difficulty, weakness, light-headedness, numbness, headache, memory problem and confusion.   Hematological:  Negative for adenopathy. Does not bruise/bleed easily.   Psychiatric/Behavioral:  Negative for agitation, behavioral problems, decreased concentration, dysphoric mood, hallucinations, self-injury, sleep disturbance, suicidal ideas, negative for hyperactivity, depressed mood and stress. The patient is not nervous/anxious.      Objective   Physical Exam  Vitals and nursing note reviewed. Exam conducted with a chaperone present.   Constitutional:       General: She is not in acute distress.     Appearance: She is well-developed. She is not diaphoretic.   HENT:      Head: Normocephalic.      Right Ear: External ear normal.      Left Ear: External ear normal.      Nose: Nose normal.   Eyes:      General: No scleral icterus.        Right eye: No discharge.         Left eye: No discharge.      Conjunctiva/sclera: Conjunctivae normal.      Pupils: Pupils are equal, round, and reactive to light.   Neck:      Thyroid: No thyromegaly.      Vascular: No carotid bruit.      Trachea: No tracheal deviation.   Cardiovascular:      Rate and Rhythm: Normal rate and regular rhythm.      Heart sounds: Normal heart sounds. No murmur heard.  Pulmonary:      Effort: Pulmonary effort is normal. No respiratory distress.      Breath sounds: Normal breath sounds. No wheezing.   Chest:   Breasts:     Breasts are symmetrical.      Right: Normal. No swelling, bleeding,  inverted nipple, mass, nipple discharge, skin change or tenderness.      Left: Normal. No swelling, bleeding, inverted nipple, mass, nipple discharge, skin change or tenderness.   Abdominal:      General: There is no distension.      Palpations: Abdomen is soft. There is no mass.      Tenderness: There is no abdominal tenderness. There is no right CVA tenderness, left CVA tenderness or guarding.      Hernia: No hernia is present. There is no hernia in the left inguinal area or right inguinal area.   Genitourinary:     General: Normal vulva.      Exam position: Lithotomy position.      Labia:         Right: No rash, tenderness, lesion or injury.         Left: No rash, tenderness, lesion or injury.       Vagina: Normal. No signs of injury and foreign body. Vaginal discharge present. No erythema, tenderness or bleeding.      Cervix: Normal.      Uterus: Normal. Not enlarged, not fixed and not tender.       Adnexa: Right adnexa normal and left adnexa normal.        Right: No mass, tenderness or fullness.          Left: No mass, tenderness or fullness.        Rectum: Normal. No mass.      Comments:   BSU normal  Urethral meatus  Normal  Perineum  Normal  Musculoskeletal:         General: No tenderness. Normal range of motion.      Cervical back: Normal range of motion and neck supple.   Lymphadenopathy:      Head:      Right side of head: No submental, submandibular, tonsillar, preauricular, posterior auricular or occipital adenopathy.      Left side of head: No submental, submandibular, tonsillar, preauricular, posterior auricular or occipital adenopathy.      Cervical: No cervical adenopathy.      Right cervical: No superficial, deep or posterior cervical adenopathy.     Left cervical: No superficial, deep or posterior cervical adenopathy.      Upper Body:      Right upper body: No supraclavicular, axillary or pectoral adenopathy.      Left upper body: No supraclavicular, axillary or pectoral adenopathy.      Lower  Body: No right inguinal adenopathy. No left inguinal adenopathy.   Skin:     General: Skin is warm and dry.      Findings: No bruising, erythema or rash.   Neurological:      Mental Status: She is alert and oriented to person, place, and time.      Coordination: Coordination normal.   Psychiatric:         Mood and Affect: Mood normal.         Behavior: Behavior normal.         Thought Content: Thought content normal.         Judgment: Judgment normal.     Assessment & Plan   Diagnoses and all orders for this visit:    1. Well woman exam with routine gynecological exam (Primary)  Normal GYN exam. Will have lab work. Encouraged SBE, pt is aware how to do self breast exam and the importance of same. Discussed weight management and importance of maintaining a healthy weight. Discussed Vitamin D intake and the importance of adequate vitamin D for both Bone Health and a healthy immune system.  Discussed Daily exercise and the importance of same, in regards to a healthy heart as well as helping to maintain her weight and improving her mental health.  BMI 25.3.  Colonoscopy will be scheduled with Dr. Martinez at [facility] per patient request..  Mammogram will be scheduled at [facility].  Pap smear is done per [facility] guidelines.        -     Liquid-based Pap Smear, Screening  -     Ambulatory Referral For Screening Colonoscopy  -     CBC & Differential  -     Comprehensive Metabolic Panel  -     Lipid Panel With LDL / HDL Ratio  -     T3, Uptake  -     TSH  -     T4, Free  -     Hemoglobin A1c  -     Urine Culture - , Urine, Clean Catch  -     UA / M With / Rflx Culture([facility] ONLY) - Urine, Clean Catch  -     Hepatitis C Antibody    2. Encounter for screening mammogram for breast cancer  Comments:  Patient will have a mammogram at [facility].  Orders:  -     Mammo Screening Digital Tomosynthesis Bilateral With CAD; Future    3. Vaginal discharge  Comments:  Patient has a small amount of vaginal discharge.   BV panel is added to Pap smear.  Orders:  -     Liquid-based Pap Smear, Screening    4. Early dry stage nonexudative age-related macular degeneration of right eye  Comments:  She is followed by Dr. Yepez.    5. Anxiety  Comments:  Patient has had quite a bit of anxiety due to her bladder issues.  She is given a refill on Xanax today.  Natanael is reviewed.  Orders:  -     ALPRAZolam (XANAX) 0.25 MG tablet; Take 1 tablet by mouth 3 (Three) Times a Day As Needed for Anxiety.  Dispense: 90 tablet; Refill: 0    6. Vitamin D deficiency  Comments:  Patient will have labs drawn.  Orders:  -     Vitamin D,25-Hydroxy    7. Interstitial cystitis  Comments:  Patient is keeping her IC under control with her diet.         BMI is >= 25 and <30. (Overweight) The following options were offered after discussion;: weight loss educational material (shared in after visit summary), exercise counseling/recommendations, and nutrition counseling/recommendations      Nancy Erwin, APRN  8/24/2023

## 2023-08-26 LAB
25(OH)D3+25(OH)D2 SERPL-MCNC: 50.2 NG/ML (ref 30–100)
ALBUMIN SERPL-MCNC: 4.6 G/DL (ref 3.5–5.2)
ALBUMIN/GLOB SERPL: 1.8 G/DL
ALP SERPL-CCNC: 61 U/L (ref 39–117)
ALT SERPL-CCNC: 18 U/L (ref 1–33)
APPEARANCE UR: CLEAR
AST SERPL-CCNC: 17 U/L (ref 1–32)
BACTERIA #/AREA URNS HPF: NORMAL /HPF
BACTERIA UR CULT: NO GROWTH
BACTERIA UR CULT: NORMAL
BASOPHILS # BLD AUTO: 0.03 10*3/MM3 (ref 0–0.2)
BASOPHILS NFR BLD AUTO: 0.7 % (ref 0–1.5)
BILIRUB SERPL-MCNC: 0.5 MG/DL (ref 0–1.2)
BILIRUB UR QL STRIP: NEGATIVE
BUN SERPL-MCNC: 12 MG/DL (ref 6–20)
BUN/CREAT SERPL: 16.4 (ref 7–25)
CALCIUM SERPL-MCNC: 9.1 MG/DL (ref 8.6–10.5)
CASTS URNS QL MICRO: NORMAL /LPF
CHLORIDE SERPL-SCNC: 101 MMOL/L (ref 98–107)
CHOLEST SERPL-MCNC: 158 MG/DL (ref 0–200)
CO2 SERPL-SCNC: 25.1 MMOL/L (ref 22–29)
COLOR UR: YELLOW
CREAT SERPL-MCNC: 0.73 MG/DL (ref 0.57–1)
EGFRCR SERPLBLD CKD-EPI 2021: 102.2 ML/MIN/1.73
EOSINOPHIL # BLD AUTO: 0.03 10*3/MM3 (ref 0–0.4)
EOSINOPHIL NFR BLD AUTO: 0.7 % (ref 0.3–6.2)
EPI CELLS #/AREA URNS HPF: NORMAL /HPF (ref 0–10)
ERYTHROCYTE [DISTWIDTH] IN BLOOD BY AUTOMATED COUNT: 12 % (ref 12.3–15.4)
GLOBULIN SER CALC-MCNC: 2.5 GM/DL
GLUCOSE SERPL-MCNC: 92 MG/DL (ref 65–99)
GLUCOSE UR QL STRIP: NEGATIVE
HBA1C MFR BLD: 5.2 % (ref 4.8–5.6)
HCT VFR BLD AUTO: 40 % (ref 34–46.6)
HCV IGG SERPL QL IA: NON REACTIVE
HDLC SERPL-MCNC: 60 MG/DL (ref 40–60)
HGB BLD-MCNC: 13.3 G/DL (ref 12–15.9)
HGB UR QL STRIP: NEGATIVE
IMM GRANULOCYTES # BLD AUTO: 0.01 10*3/MM3 (ref 0–0.05)
IMM GRANULOCYTES NFR BLD AUTO: 0.2 % (ref 0–0.5)
KETONES UR QL STRIP: NEGATIVE
LDLC SERPL CALC-MCNC: 88 MG/DL (ref 0–100)
LDLC/HDLC SERPL: 1.48 {RATIO}
LEUKOCYTE ESTERASE UR QL STRIP: NEGATIVE
LYMPHOCYTES # BLD AUTO: 1.27 10*3/MM3 (ref 0.7–3.1)
LYMPHOCYTES NFR BLD AUTO: 30 % (ref 19.6–45.3)
MCH RBC QN AUTO: 29.8 PG (ref 26.6–33)
MCHC RBC AUTO-ENTMCNC: 33.3 G/DL (ref 31.5–35.7)
MCV RBC AUTO: 89.5 FL (ref 79–97)
MICRO URNS: NORMAL
MICRO URNS: NORMAL
MONOCYTES # BLD AUTO: 0.35 10*3/MM3 (ref 0.1–0.9)
MONOCYTES NFR BLD AUTO: 8.3 % (ref 5–12)
NEUTROPHILS # BLD AUTO: 2.54 10*3/MM3 (ref 1.7–7)
NEUTROPHILS NFR BLD AUTO: 60.1 % (ref 42.7–76)
NITRITE UR QL STRIP: NEGATIVE
NRBC BLD AUTO-RTO: 0 /100 WBC (ref 0–0.2)
PH UR STRIP: 6 [PH] (ref 5–7.5)
PLATELET # BLD AUTO: 249 10*3/MM3 (ref 140–450)
POTASSIUM SERPL-SCNC: 4 MMOL/L (ref 3.5–5.2)
PROT SERPL-MCNC: 7.1 G/DL (ref 6–8.5)
PROT UR QL STRIP: NORMAL
RBC # BLD AUTO: 4.47 10*6/MM3 (ref 3.77–5.28)
RBC #/AREA URNS HPF: NORMAL /HPF (ref 0–2)
SODIUM SERPL-SCNC: 137 MMOL/L (ref 136–145)
SP GR UR STRIP: 1.03 (ref 1–1.03)
T3RU NFR SERPL: 24 % (ref 24–39)
T4 FREE SERPL-MCNC: 1.04 NG/DL (ref 0.93–1.7)
TRIGL SERPL-MCNC: 47 MG/DL (ref 0–150)
TSH SERPL DL<=0.005 MIU/L-ACNC: 1.82 UIU/ML (ref 0.27–4.2)
URINALYSIS REFLEX: NORMAL
UROBILINOGEN UR STRIP-MCNC: 0.2 MG/DL (ref 0.2–1)
VLDLC SERPL CALC-MCNC: 10 MG/DL (ref 5–40)
WBC # BLD AUTO: 4.23 10*3/MM3 (ref 3.4–10.8)
WBC #/AREA URNS HPF: NORMAL /HPF (ref 0–5)

## 2023-08-28 LAB — HPV I/H RISK 4 DNA CVX QL PROBE+SIG AMP: NOT DETECTED

## 2023-08-29 LAB — TRICHOMONAS VAGINALIS PCR: NOT DETECTED

## 2023-08-30 LAB
GEN CATEG CVX/VAG CYTO-IMP: NORMAL
LAB AP CASE REPORT: NORMAL
LAB AP GYN ADDITIONAL INFORMATION: NORMAL
LAB AP GYN OTHER FINDINGS: NORMAL
Lab: NORMAL
PATH INTERP SPEC-IMP: NORMAL
STAT OF ADQ CVX/VAG CYTO-IMP: NORMAL

## 2023-09-01 DIAGNOSIS — Z12.31 ENCOUNTER FOR SCREENING MAMMOGRAM FOR BREAST CANCER: ICD-10-CM

## 2023-09-29 ENCOUNTER — OFFICE VISIT (OUTPATIENT)
Dept: OBSTETRICS AND GYNECOLOGY | Facility: CLINIC | Age: 48
End: 2023-09-29
Payer: COMMERCIAL

## 2023-09-29 VITALS
WEIGHT: 174 LBS | SYSTOLIC BLOOD PRESSURE: 156 MMHG | BODY MASS INDEX: 24.91 KG/M2 | HEIGHT: 70 IN | DIASTOLIC BLOOD PRESSURE: 84 MMHG

## 2023-09-29 DIAGNOSIS — R87.619 ENDOMETRIAL CELLS ON CERVICAL PAP SMEAR INCONSISTENT W/LMP: ICD-10-CM

## 2023-09-29 DIAGNOSIS — N83.201 CYST OF RIGHT OVARY: Primary | ICD-10-CM

## 2023-09-29 PROCEDURE — 99213 OFFICE O/P EST LOW 20 MIN: CPT | Performed by: NURSE PRACTITIONER

## 2023-09-29 NOTE — PROGRESS NOTES
"Subjective     Natalie Lawrence is a 48 y.o. female    History of Present Illness  Patient is here today for follow-up endometrial cells that were noted on her Pap smear.  She has no complaints.  Sound was done in the office today.      /84   Ht 177.8 cm (70\")   Wt 78.9 kg (174 lb)   LMP 2023 (Exact Date)   BMI 24.97 kg/m²     Outpatient Encounter Medications as of 2023   Medication Sig Dispense Refill    ALPRAZolam (XANAX) 0.25 MG tablet Take 1 tablet by mouth 3 (Three) Times a Day As Needed for Anxiety. 90 tablet 0    cholecalciferol (VITAMIN D3) 1000 UNITS tablet Take 5 tablets by mouth Daily.      Multiple Vitamins-Minerals (DAILY VITAMIN FORMULA+MINERALS PO) Take  by mouth.      Gxmanoptfaen-Jeadnno-H-Probiot (AZO URINARY TRACT SUPPORT PO) Take  by mouth.       No facility-administered encounter medications on file as of 2023.       Past Medical History  Past Medical History:   Diagnosis Date    Anxiety     Macular degeneration     Urinary tract infection        Surgical History  Past Surgical History:   Procedure Laterality Date     SECTION      CYSTOSCOPY      DILATATION AND CURETTAGE      LYMPH NODE BIOPSY         Family History  Family History   Problem Relation Age of Onset    Heart disease Father     Diabetes Father     Hypertension Father     Prostate cancer Father 60    No Known Problems Mother     Stroke Paternal Grandfather     No Known Problems Son     Breast cancer Neg Hx     Ovarian cancer Neg Hx     Uterine cancer Neg Hx     Colon cancer Neg Hx     Melanoma Neg Hx        The following portions of the patient's history were reviewed and updated as appropriate: allergies, current medications, past family history, past medical history, past social history, past surgical history, and problem list.    Review of Systems   Constitutional:  Negative for activity change, appetite change, chills, diaphoresis, fatigue, fever, unexpected weight gain and unexpected weight loss. "   HENT:  Negative for congestion, dental problem, drooling, ear discharge, ear pain, facial swelling, hearing loss, mouth sores, nosebleeds, postnasal drip, rhinorrhea, sinus pressure, sneezing, sore throat, swollen glands, tinnitus, trouble swallowing and voice change.    Eyes:  Negative for blurred vision, double vision, photophobia, pain, discharge, redness, itching and visual disturbance.   Respiratory:  Negative for apnea, cough, choking, chest tightness, shortness of breath, wheezing and stridor.    Cardiovascular:  Negative for chest pain, palpitations and leg swelling.   Gastrointestinal:  Negative for abdominal distention, abdominal pain, anal bleeding, blood in stool, constipation, diarrhea, nausea, rectal pain, vomiting, GERD and indigestion.   Endocrine: Negative for cold intolerance, heat intolerance, polydipsia, polyphagia and polyuria.   Genitourinary:  Negative for amenorrhea, breast discharge, breast lump, breast pain, decreased libido, decreased urine volume, difficulty urinating, dyspareunia, dysuria, flank pain, frequency, genital sores, hematuria, menstrual problem, pelvic pain, pelvic pressure, urgency, urinary incontinence, vaginal bleeding, vaginal discharge and vaginal pain.   Musculoskeletal:  Negative for arthralgias, back pain, gait problem, joint swelling, myalgias, neck pain, neck stiffness and bursitis.   Skin:  Negative for color change, dry skin and rash.   Allergic/Immunologic: Negative for environmental allergies, food allergies and immunocompromised state.   Neurological:  Negative for dizziness, tremors, seizures, syncope, facial asymmetry, speech difficulty, weakness, light-headedness, numbness, headache, memory problem and confusion.   Hematological:  Negative for adenopathy. Does not bruise/bleed easily.   Psychiatric/Behavioral:  Negative for agitation, behavioral problems, decreased concentration, dysphoric mood, hallucinations, self-injury, sleep disturbance, suicidal  ideas, negative for hyperactivity, depressed mood and stress. The patient is not nervous/anxious.      Objective   Physical Exam  Vitals and nursing note reviewed.   Constitutional:       Appearance: She is well-developed.   HENT:      Head: Normocephalic and atraumatic.   Eyes:      General:         Right eye: No discharge.         Left eye: No discharge.      Conjunctiva/sclera: Conjunctivae normal.   Neck:      Thyroid: No thyromegaly.   Cardiovascular:      Rate and Rhythm: Normal rate and regular rhythm.      Heart sounds: Normal heart sounds.   Pulmonary:      Effort: Pulmonary effort is normal.      Breath sounds: Normal breath sounds.   Musculoskeletal:         General: Normal range of motion.      Cervical back: Normal range of motion and neck supple.   Skin:     General: Skin is warm and dry.   Neurological:      Mental Status: She is alert and oriented to person, place, and time.   Psychiatric:         Mood and Affect: Mood normal.         Behavior: Behavior normal.         Thought Content: Thought content normal.         Judgment: Judgment normal.       PHQ-9 Depression Screening  Little interest or pleasure in doing things? 0-->not at all   Feeling down, depressed, or hopeless? 0-->not at all   Trouble falling or staying asleep, or sleeping too much?     Feeling tired or having little energy?     Poor appetite or overeating?     Feeling bad about yourself - or that you are a failure or have let yourself or your family down?     Trouble concentrating on things, such as reading the newspaper or watching television?     Moving or speaking so slowly that other people could have noticed? Or the opposite - being so fidgety or restless that you have been moving around a lot more than usual?     Thoughts that you would be better off dead, or of hurting yourself in some way?     PHQ-9 Total Score 0   If you checked off any problems, how difficult have these problems made it for you to do your work, take care of  things at home, or get along with other people?          Assessment & Plan   Diagnoses and all orders for this visit:    1. Cyst of right ovary (Primary)  Comments:  Patient has a 1.3 cm cyst on the right ovary.  It appears slightly complex.  She is offered tumor markers today versus repeating it right after her period.  She prefers to wait as her period is almost due to start again and we need to repeat it anyway to check her endometrium.  If the this to still present we will get tumor markers at that time.    2. Endometrial cells on cervical Pap smear inconsistent w/LMP  Comments:  Patient had endometrial cells on her Pap smear.  Her endometrium on ultrasound today was 1.8 cm.  She is ready to start another cycle.  She will call for repeat pelvic ultrasound.  She will call the day she starts her period to have it done within a week.         BMI is within normal parameters. No other follow-up for BMI required.      Nancy Erwin, APRN  9/29/2023

## 2023-10-10 ENCOUNTER — OFFICE VISIT (OUTPATIENT)
Dept: OBSTETRICS AND GYNECOLOGY | Facility: CLINIC | Age: 48
End: 2023-10-10
Payer: COMMERCIAL

## 2023-10-10 VITALS
DIASTOLIC BLOOD PRESSURE: 82 MMHG | WEIGHT: 174 LBS | BODY MASS INDEX: 24.91 KG/M2 | SYSTOLIC BLOOD PRESSURE: 142 MMHG | HEIGHT: 70 IN

## 2023-10-10 DIAGNOSIS — R87.619 ENDOMETRIAL CELLS ON CERVICAL PAP SMEAR INCONSISTENT W/LMP: Primary | ICD-10-CM

## 2023-10-10 DIAGNOSIS — N83.201 CYSTS OF BOTH OVARIES: ICD-10-CM

## 2023-10-10 DIAGNOSIS — N83.202 CYSTS OF BOTH OVARIES: ICD-10-CM

## 2023-10-10 PROCEDURE — 88305 TISSUE EXAM BY PATHOLOGIST: CPT | Performed by: NURSE PRACTITIONER

## 2023-10-10 NOTE — PROGRESS NOTES
"Subjective     Natalie Lawrence is a 48 y.o. female    History of Present Illness  Patient is here today for endometrial biopsy due to endometrial cells on her Pap smear.  She has no complaints.        /82   Ht 177.8 cm (70\")   Wt 78.9 kg (174 lb)   LMP 10/02/2023 (Exact Date)   BMI 24.97 kg/mý     Outpatient Encounter Medications as of 10/10/2023   Medication Sig Dispense Refill    ALPRAZolam (XANAX) 0.25 MG tablet Take 1 tablet by mouth 3 (Three) Times a Day As Needed for Anxiety. 90 tablet 0    cholecalciferol (VITAMIN D3) 1000 UNITS tablet Take 5 tablets by mouth Daily.      Multiple Vitamins-Minerals (DAILY VITAMIN FORMULA+MINERALS PO) Take  by mouth.      Afjpfvgqogvz-Tcjuoyu-M-Probiot (AZO URINARY TRACT SUPPORT PO) Take  by mouth.       No facility-administered encounter medications on file as of 10/10/2023.       Past Medical History  Past Medical History:   Diagnosis Date    Anxiety     Macular degeneration     Urinary tract infection        Surgical History  Past Surgical History:   Procedure Laterality Date     SECTION      CYSTOSCOPY      DILATATION AND CURETTAGE      LYMPH NODE BIOPSY         Family History  Family History   Problem Relation Age of Onset    Heart disease Father     Diabetes Father     Hypertension Father     Prostate cancer Father 60    No Known Problems Mother     Stroke Paternal Grandfather     No Known Problems Son     Breast cancer Neg Hx     Ovarian cancer Neg Hx     Uterine cancer Neg Hx     Colon cancer Neg Hx     Melanoma Neg Hx        The following portions of the patient's history were reviewed and updated as appropriate: allergies, current medications, past family history, past medical history, past social history, past surgical history, and problem list.    Review of Systems   Constitutional:  Negative for activity change, appetite change, chills, diaphoresis, fatigue, fever, unexpected weight gain and unexpected weight loss.   HENT:  Negative for congestion, " dental problem, drooling, ear discharge, ear pain, facial swelling, hearing loss, mouth sores, nosebleeds, postnasal drip, rhinorrhea, sinus pressure, sneezing, sore throat, swollen glands, tinnitus, trouble swallowing and voice change.    Eyes:  Negative for blurred vision, double vision, photophobia, pain, discharge, redness, itching and visual disturbance.   Respiratory:  Negative for apnea, cough, choking, chest tightness, shortness of breath, wheezing and stridor.    Cardiovascular:  Negative for chest pain, palpitations and leg swelling.   Gastrointestinal:  Negative for abdominal distention, abdominal pain, anal bleeding, blood in stool, constipation, diarrhea, nausea, rectal pain, vomiting, GERD and indigestion.   Endocrine: Negative for cold intolerance, heat intolerance, polydipsia, polyphagia and polyuria.   Genitourinary:  Negative for amenorrhea, breast discharge, breast lump, breast pain, decreased libido, decreased urine volume, difficulty urinating, dyspareunia, dysuria, flank pain, frequency, genital sores, hematuria, menstrual problem, pelvic pain, pelvic pressure, urgency, urinary incontinence, vaginal bleeding, vaginal discharge and vaginal pain.   Musculoskeletal:  Negative for arthralgias, back pain, gait problem, joint swelling, myalgias, neck pain, neck stiffness and bursitis.   Skin:  Negative for color change, dry skin and rash.   Allergic/Immunologic: Negative for environmental allergies, food allergies and immunocompromised state.   Neurological:  Negative for dizziness, tremors, seizures, syncope, facial asymmetry, speech difficulty, weakness, light-headedness, numbness, headache, memory problem and confusion.   Hematological:  Negative for adenopathy. Does not bruise/bleed easily.   Psychiatric/Behavioral:  Negative for agitation, behavioral problems, decreased concentration, dysphoric mood, hallucinations, self-injury, sleep disturbance, suicidal ideas, negative for hyperactivity,  depressed mood and stress. The patient is not nervous/anxious.        Objective   Physical Exam  Vitals and nursing note reviewed. Exam conducted with a chaperone present.   Constitutional:       Appearance: She is well-developed.   HENT:      Head: Normocephalic and atraumatic.   Abdominal:      General: There is no distension.      Palpations: Abdomen is soft.      Tenderness: There is no abdominal tenderness.      Hernia: There is no hernia in the left inguinal area or right inguinal area.   Genitourinary:     General: Normal vulva.      Exam position: Lithotomy position.      Labia:         Right: No rash, tenderness, lesion or injury.         Left: No rash, tenderness, lesion or injury.       Vagina: Normal. No vaginal discharge, erythema, tenderness or bleeding.      Cervix: Normal.      Uterus: Normal. Not enlarged and not tender.       Adnexa: Right adnexa normal and left adnexa normal.        Right: No mass, tenderness or fullness.          Left: No mass, tenderness or fullness.     Lymphadenopathy:      Lower Body: No right inguinal adenopathy. No left inguinal adenopathy.   Skin:     General: Skin is warm and dry.   Neurological:      Mental Status: She is alert and oriented to person, place, and time.   Psychiatric:         Mood and Affect: Mood normal.         Behavior: Behavior normal.         Thought Content: Thought content normal.         Judgment: Judgment normal.         PHQ-9 Depression Screening  Little interest or pleasure in doing things? 0-->not at all   Feeling down, depressed, or hopeless? 0-->not at all   Trouble falling or staying asleep, or sleeping too much?     Feeling tired or having little energy?     Poor appetite or overeating?     Feeling bad about yourself - or that you are a failure or have let yourself or your family down?     Trouble concentrating on things, such as reading the newspaper or watching television?     Moving or speaking so slowly that other people could have  noticed? Or the opposite - being so fidgety or restless that you have been moving around a lot more than usual?     Thoughts that you would be better off dead, or of hurting yourself in some way?     PHQ-9 Total Score 0   If you checked off any problems, how difficult have these problems made it for you to do your work, take care of things at home, or get along with other people?          Assessment & Plan   Diagnoses and all orders for this visit:    1. Endometrial cells on cervical Pap smear inconsistent w/LMP (Primary)  Comments:  Patient had a Pap smear with endometrial cells noted.  Ultrasound done today endometrium was 1.0 cm.  Endometrial biopsy was obtained.  See procedure note.  Orders:  -     Tissue Pathology Exam    2. Cysts of both ovaries  Comments:  Patient cyst has resolved.         BMI is within normal parameters. No other follow-up for BMI required.      Nancy Erwin, APRN  10/10/2023

## 2023-10-10 NOTE — PROGRESS NOTES
Procedure   Procedures   An endometrial biopsy was performed in the office today.  The cervix was visualized with a speculum and prepped with Betadine.  The anterior lip was grasped with a tenaculum and a standard endometrial sampling Pipelle was passed into the uterine cavity.  The patient experienced moderate cramping and the uterus sounded to 5 cm.  A small amount of tissue was obtained with 2 passes.  The tenaculum was removed and the site was hemostatic.  She tolerated the procedure well.

## 2023-10-12 LAB
CYTO UR: NORMAL
LAB AP CASE REPORT: NORMAL
LAB AP CLINICAL INFORMATION: NORMAL
Lab: NORMAL
PATH REPORT.FINAL DX SPEC: NORMAL
PATH REPORT.GROSS SPEC: NORMAL

## 2023-11-09 ENCOUNTER — OFFICE VISIT (OUTPATIENT)
Dept: FAMILY MEDICINE CLINIC | Facility: CLINIC | Age: 48
End: 2023-11-09
Payer: COMMERCIAL

## 2023-11-09 VITALS
RESPIRATION RATE: 20 BRPM | WEIGHT: 180.6 LBS | TEMPERATURE: 98.4 F | HEIGHT: 70 IN | HEART RATE: 73 BPM | DIASTOLIC BLOOD PRESSURE: 88 MMHG | SYSTOLIC BLOOD PRESSURE: 146 MMHG | BODY MASS INDEX: 25.86 KG/M2 | OXYGEN SATURATION: 100 %

## 2023-11-09 DIAGNOSIS — Z23 FLU VACCINE NEED: Primary | ICD-10-CM

## 2023-11-09 DIAGNOSIS — L98.9 SKIN LESION OF RIGHT LEG: ICD-10-CM

## 2023-11-09 NOTE — LETTER
[facility]  Vaccine Consent Form    Patient Name:  Natalie Lawrence  Patient :  1975     Vaccine(s) Ordered    Tdap Vaccine => 8yo IM (BOOSTRIX)        Screening Checklist  The following questions should be completed prior to vaccination. If you answer “yes” to any question, it does not necessarily mean you should not be vaccinated. It just means we may need to clarify or ask more questions. If a question is unclear, please ask your healthcare provider to explain it.    Yes No   Any fever or moderate to severe illness today (mild illness and/or antibiotic treatment are not contraindications)?     Do you have a history of a serious reaction to any previous vaccinations, such as anaphylaxis, encephalopathy within 7 days, Guillain-Lak[place] syndrome within 6 weeks, seizure?     Have you received any live vaccine(s) in the past month (MMR, RUSLAN)?     Do you have an anaphylactic allergy to latex (DTaP, DTaP-IPV, Hep A, Hep B, MenB, RV, Td, Tdap), baker’s yeast (Hep B, HPV), or gelatin (RUSLAN, MMR)?     Do you have an anaphylactic allergy to neomycin (Rabies, RUSLAN, MMR, IPV, Hep A), polymyxin B (IPV), or streptomycin (IPV)?      Any cancer, leukemia, AIDS, or other immune system disorder? (RUSLAN, MMR, RV)     Do you have a parent, brother, or sister with an immune system problem (if immune competence of vaccine recipient clinically verified, can proceed)? (MMR, RUSLAN)     Any recent steroid treatments for >2 weeks, chemotherapy, or radiation treatment? (RUSLAN, MMR)     Have you received antibody-containing blood transfusions or IVIG in the past 11 months (recommended interval is dependent on product)? (MMR, RUSLAN)     Have you taken antiviral drugs (acyclovir, famciclovir, valacyclovir) in the last 24 or 48 hours, respectively (RUSLAN)?      Are you pregnant or planning to become pregnant within 1 month? (RUSLAN, MMR, HPV, IPV, MenB; For hep B- refer to Engerix-B)     For infants, have you ever been told your child has had  intussusception or a medical emergency involving obstruction of the intestine (RV)? If not for an infant, can skip this question.         *Ordering Physician/APC should be consulted if “yes” is checked by the patient or guardian above.      I have received, read, and understand the Vaccine Information Statement (VIS) for each vaccine ordered above.  I have considered my health status as well as the health status of my close contacts.  I have taken the opportunity to discuss my vaccine questions with my health care provider.   I have requested that the ordered vaccine(s) be given to me.  I understand the benefits and risks of the vaccines.  I understand that I should remain in the clinic for 15 minutes after receiving the vaccine(s).  _________________________________________________________  Signature of Patient or Parent/Legal Guardian ____________________  Date

## 2023-11-09 NOTE — PROGRESS NOTES
"Chief Complaint  Suspicious Skin Lesion (Pt states that she has a mole on her right hip that she would like looked at. )    Subjective        Natalie Lawrence presents to [facility]  History of Present Illness  Here for acute visit  Reports has had mole on right hip for 10+ years however recently it has become more itchy and catching on her clothes. She is concerned there are some changes in it. She has an appt with dermatology but it is not until feb (3 months) and is worried to let it go that long.   She also reports she is out of date on tdap and flu vaccines and would like to get those today.       Objective   Vital Signs:  /88 (BP Location: Right arm, Patient Position: Sitting, Cuff Size: Adult)   Pulse 73   Temp 98.4 °F (36.9 °C) (Infrared)   Resp 20   Ht 177.8 cm (70\")   Wt 81.9 kg (180 lb 9.6 oz)   SpO2 100%   BMI 25.91 kg/m²   Estimated body mass index is 25.91 kg/m² as calculated from the following:    Height as of this encounter: 177.8 cm (70\").    Weight as of this encounter: 81.9 kg (180 lb 9.6 oz).               Physical Exam  Vitals and nursing note reviewed.   Constitutional:       Appearance: She is well-developed.   HENT:      Head: Normocephalic and atraumatic.   Eyes:      Conjunctiva/sclera: Conjunctivae normal.   Cardiovascular:      Rate and Rhythm: Normal rate and regular rhythm.   Pulmonary:      Effort: Pulmonary effort is normal.   Musculoskeletal:      Cervical back: Normal range of motion.   Skin:     Findings: Lesion present.          Neurological:      General: No focal deficit present.      Mental Status: She is alert and oriented to person, place, and time.   Psychiatric:         Mood and Affect: Mood normal.         Behavior: Behavior normal.        Result Review :                   Assessment and Plan   Diagnoses and all orders for this visit:    1. Flu vaccine need (Primary)  -     Fluzone >6 Months ([id number])    2. Skin lesion of right " leg  Comments:  right hip    Other orders  -     Tdap Vaccine => 9yo IM (BOOSTRIX)      Plan:  Recommend to keep follow up with dermatology   Lesion looks like it needs to be removed but not urgently   Tdap and flu vaccine today            Follow Up   Return if symptoms worsen or fail to improve.  Patient was given instructions and counseling regarding her condition or for health maintenance advice. Please see specific information pulled into the AVS if appropriate.

## 2024-05-17 ENCOUNTER — PRE-ADMISSION TESTING (OUTPATIENT)
Dept: PREADMISSION TESTING | Facility: HOSPITAL | Age: 49
End: 2024-05-17
Payer: COMMERCIAL

## 2024-05-17 ENCOUNTER — ANESTHESIA EVENT (OUTPATIENT)
Dept: PERIOP | Facility: HOSPITAL | Age: 49
End: 2024-05-17
Payer: COMMERCIAL

## 2024-05-17 VITALS
SYSTOLIC BLOOD PRESSURE: 150 MMHG | WEIGHT: 179.23 LBS | RESPIRATION RATE: 16 BRPM | OXYGEN SATURATION: 100 % | BODY MASS INDEX: 25.66 KG/M2 | HEART RATE: 96 BPM | HEIGHT: 70 IN | DIASTOLIC BLOOD PRESSURE: 86 MMHG

## 2024-05-17 LAB
DEPRECATED RDW RBC AUTO: 39.7 FL (ref 37–54)
ERYTHROCYTE [DISTWIDTH] IN BLOOD BY AUTOMATED COUNT: 12.4 % (ref 12.3–15.4)
HCT VFR BLD AUTO: 39.1 % (ref 34–46.6)
HGB BLD-MCNC: 13.2 G/DL (ref 12–15.9)
MCH RBC QN AUTO: 29.7 PG (ref 26.6–33)
MCHC RBC AUTO-ENTMCNC: 33.8 G/DL (ref 31.5–35.7)
MCV RBC AUTO: 87.9 FL (ref 79–97)
PLATELET # BLD AUTO: 234 10*3/MM3 (ref 140–450)
PMV BLD AUTO: 10.1 FL (ref 6–12)
RBC # BLD AUTO: 4.45 10*6/MM3 (ref 3.77–5.28)
WBC NRBC COR # BLD AUTO: 6.5 10*3/MM3 (ref 3.4–10.8)

## 2024-05-17 PROCEDURE — 85027 COMPLETE CBC AUTOMATED: CPT

## 2024-05-17 PROCEDURE — 36415 COLL VENOUS BLD VENIPUNCTURE: CPT

## 2024-05-17 NOTE — DISCHARGE INSTRUCTIONS
Preparing for Surgery  Follow these instructions before the procedure:  Several days or weeks before your procedure  Ask your health care provider about:  Changing or stopping your regular medicines. This is especially important if you are taking diabetes medicines or blood thinners.  Taking medicines such as aspirin and ibuprofen. These medicines can thin your blood. Do not take these medicines unless your health care provider tells you to take them.  Taking over-the-counter medicines, vitamins, herbs, and supplements.    Contact your surgeon if you:  Develop a fever of more than 100.4°F (38°C) or other feelings of illness during the 48 hours before your surgery.  Have symptoms that get worse.  Have questions or concerns about your surgery.  If you are going home the same day of your surgery you will need to arrange for a responsible adult, age 18 years old or older, to drive you home from the hospital and stay with you for 24 hours. Verification of the  will be made prior to any procedure requiring sedation. You may not go home in a taxi or any form of public transportation by yourself.     Day before your procedure  24 hours before your procedure DO NOT drink alcoholic beverages or smoke.  24 hours before your procedure STOP taking Erectile Dysfunction medication (i.e.,Cialis, Viagra)   You may be asked to shower with a germ-killing soap.  Day of your procedure   You may take the following medication(s) the morning of surgery with a sip of water: XANAX      8 hours before your procedure STOP all food, any dairy products, and full liquids. This includes hard candy, chewing gum or mints. This is extremely important to prevent serious complications.     Up to 2 hours before your scheduled arrival time, you may have clear liquids no cream, powder, or pulp of any kind. Safe options are water, black coffee, plain tea, soda, Gatorade/Powerade, clear broth, apple juice.    2 hours before your scheduled arrival  time, STOP drinking clear liquids.    You may need to take another shower with a germ-killing soap before you leave home in the morning. Do not use perfumes, colognes, or body lotions.  Wear comfortable loose-fitting clothing.  Remove all jewelry including body piercing and rings, dark colored nail polish, and make up prior to arrival at the hospital. Leave all valuables at home.   Bring your hearing aids if you rely on them.  Do not wear contact lenses. If you wear eyeglasses remember to bring a case to store them in while you are in surgery.  Do not use denture adhesives since you will be asked to remove them during your surgery.    You do not need to bring your home medications into the hospital.   Bring your sleep apnea device with you on the day of your surgery (if this applies to you).  If you wear portable oxygen, bring it with you.   If you are staying overnight, you may bring a bag of items you may need such as slippers, robe and a change of clothes for your discharge. You may want to leave these items in the car until you are ready for them since your family will take your belongings when you leave the pre-operative area.  Arrive at the hospital as scheduled by the office. You will be asked to arrive 2 hours prior to your surgery time in order to prepare for your procedure.  When you arrive at the hospital  Go to the registration desk located at the main entrance of the hospital.  After registration is completed, you will be given a beeper and a sticker sheet. Take the stickers to Outpatient Surgery and place in the tray at the end of the desk to notify the staff that you have arrived and registered.   Return to the lobby to wait. You are not always called back according to the time of arrival but rather the time your doctor will be ready.  When your beeper lights up and vibrates proceed through the double doors, under the stairs, and a member of the Outpatient Surgery staff will escort you to your  preoperative room.     How to Use Chlorhexidine Before Surgery  Chlorhexidine gluconate (CHG) is a germ-killing (antiseptic) solution that is used to clean the skin. It can get rid of the bacteria that normally live on the skin and can keep them away for about 24 hours. To clean your skin with CHG, you may be given:  A CHG solution to use in the shower or as part of a sponge bath.  A prepackaged cloth that contains CHG.  Cleaning your skin with CHG may help lower the risk for infection:  While you are staying in the intensive care unit of the hospital.  If you have a vascular access, such as a central line, to provide short-term or long-term access to your veins.  If you have a catheter to drain urine from your bladder.  If you are on a ventilator. A ventilator is a machine that helps you breathe by moving air in and out of your lungs.  After surgery.  What are the risks?  Risks of using CHG include:  A skin reaction.  Hearing loss, if CHG gets in your ears and you have a perforated eardrum.  Eye injury, if CHG gets in your eyes and is not rinsed out.  The CHG product catching fire.  Make sure that you avoid smoking and flames after applying CHG to your skin.  Do not use CHG:  If you have a chlorhexidine allergy or have previously reacted to chlorhexidine.  On babies younger than 2 months of age.  How to use CHG solution  Use CHG only as told by your health care provider, and follow the instructions on the label.  Use the full amount of CHG as directed. Usually, this is one bottle.  During a shower    Follow these steps when using CHG solution during a shower (unless your health care provider gives you different instructions):  Start the shower.  Use your normal soap and shampoo to wash your face and hair.  Turn off the shower or move out of the shower stream.  Pour the CHG onto a clean washcloth. Do not use any type of brush or rough-edged sponge.  Starting at your neck, lather your body down to your toes. Make  sure you follow these instructions:  If you will be having surgery, pay special attention to the part of your body where you will be having surgery. Scrub this area for at least 1 minute.  Do not use CHG on your head or face. If the solution gets into your ears or eyes, rinse them well with water.  Avoid your genital area.  Avoid any areas of skin that have broken skin, cuts, or scrapes.  Scrub your back and under your arms. Make sure to wash skin folds.  Let the lather sit on your skin for 1-2 minutes or as long as told by your health care provider.  Thoroughly rinse your entire body in the shower. Make sure that all body creases and crevices are rinsed well.  Dry off with a clean towel. Do not put any substances on your body afterward--such as powder, lotion, or perfume--unless you are told to do so by your health care provider. Only use lotions that are recommended by the .  Put on clean clothes or pajamas.  If it is the night before your surgery, sleep in clean sheets.     During a sponge bath  Follow these steps when using CHG solution during a sponge bath (unless your health care provider gives you different instructions):  Use your normal soap and shampoo to wash your face and hair.  Pour the CHG onto a clean washcloth.  Starting at your neck, lather your body down to your toes. Make sure you follow these instructions:  If you will be having surgery, pay special attention to the part of your body where you will be having surgery. Scrub this area for at least 1 minute.  Do not use CHG on your head or face. If the solution gets into your ears or eyes, rinse them well with water.  Avoid your genital area.  Avoid any areas of skin that have broken skin, cuts, or scrapes.  Scrub your back and under your arms. Make sure to wash skin folds.  Let the lather sit on your skin for 1-2 minutes or as long as told by your health care provider.  Using a different clean, wet washcloth, thoroughly rinse your  entire body. Make sure that all body creases and crevices are rinsed well.  Dry off with a clean towel. Do not put any substances on your body afterward--such as powder, lotion, or perfume--unless you are told to do so by your health care provider. Only use lotions that are recommended by the .  Put on clean clothes or pajamas.  If it is the night before your surgery, sleep in clean sheets.  How to use CHG prepackaged cloths  Only use CHG cloths as told by your health care provider, and follow the instructions on the label.  Use the CHG cloth on clean, dry skin.  Do not use the CHG cloth on your head or face unless your health care provider tells you to.  When washing with the CHG cloth:  Avoid your genital area.  Avoid any areas of skin that have broken skin, cuts, or scrapes.  Before surgery    Follow these steps when using a CHG cloth to clean before surgery (unless your health care provider gives you different instructions):  Using the CHG cloth, vigorously scrub the part of your body where you will be having surgery. Scrub using a back-and-forth motion for 3 minutes. The area on your body should be completely wet with CHG when you are done scrubbing.  Do not rinse. Discard the cloth and let the area air-dry. Do not put any substances on the area afterward, such as powder, lotion, or perfume.  Put on clean clothes or pajamas.  If it is the night before your surgery, sleep in clean sheets.     For general bathing  Follow these steps when using CHG cloths for general bathing (unless your health care provider gives you different instructions).  Use a separate CHG cloth for each area of your body. Make sure you wash between any folds of skin and between your fingers and toes. Wash your body in the following order, switching to a new cloth after each step:  The front of your neck, shoulders, and chest.  Both of your arms, under your arms, and your hands.  Your stomach and groin area, avoiding the  genitals.  Your right leg and foot.  Your left leg and foot.  The back of your neck, your back, and your buttocks.  Do not rinse. Discard the cloth and let the area air-dry. Do not put any substances on your body afterward--such as powder, lotion, or perfume--unless you are told to do so by your health care provider. Only use lotions that are recommended by the .  Put on clean clothes or pajamas.  Contact a health care provider if:  Your skin gets irritated after scrubbing.  You have questions about using your solution or cloth.  You swallow any chlorhexidine. Call your local poison control center (1-406.267.9897 in the [place]  Get help right away if:  Your eyes itch badly, or they become very red or swollen.  Your skin itches badly and is red or swollen.  Your hearing changes.  You have trouble seeing.  You have swelling or tingling in your mouth or throat.  You have trouble breathing.  These symptoms may represent a serious problem that is an emergency. Do not wait to see if the symptoms will go away. Get medical help right away. Call your local emergency services (260 in the [place] Do not drive yourself to the hospital.  Summary  Chlorhexidine gluconate (CHG) is a germ-killing (antiseptic) solution that is used to clean the skin. Cleaning your skin with CHG may help to lower your risk for infection.  You may be given CHG to use for bathing. It may be in a bottle or in a prepackaged cloth to use on your skin. Carefully follow your health care provider's instructions and the instructions on the product label.  Do not use CHG if you have a chlorhexidine allergy.  Contact your health care provider if your skin gets irritated after scrubbing.  This information is not intended to replace advice given to you by your health care provider. Make sure you discuss any questions you have with your health care provider.  Document Revised: 04/17/2023 Document Reviewed: 02/28/2022  Elsevier Patient Education © 2023  [employer]

## 2024-05-24 ENCOUNTER — ANESTHESIA (OUTPATIENT)
Dept: PERIOP | Facility: HOSPITAL | Age: 49
End: 2024-05-24
Payer: COMMERCIAL

## 2024-05-24 ENCOUNTER — HOSPITAL ENCOUNTER (OUTPATIENT)
Facility: HOSPITAL | Age: 49
Setting detail: HOSPITAL OUTPATIENT SURGERY
Discharge: HOME | End: 2024-05-24
Attending: SURGERY | Admitting: SURGERY
Payer: COMMERCIAL

## 2024-05-24 VITALS
RESPIRATION RATE: 18 BRPM | DIASTOLIC BLOOD PRESSURE: 65 MMHG | OXYGEN SATURATION: 96 % | TEMPERATURE: 96.6 F | SYSTOLIC BLOOD PRESSURE: 101 MMHG | HEART RATE: 67 BPM

## 2024-05-24 DIAGNOSIS — D48.9 NEOPLASM OF UNCERTAIN BEHAVIOR: ICD-10-CM

## 2024-05-24 DIAGNOSIS — Z98.890 POSTOPERATIVE STATE: Primary | ICD-10-CM

## 2024-05-24 LAB — B-HCG UR QL: NEGATIVE

## 2024-05-24 PROCEDURE — 25010000002 DEXAMETHASONE PER 1 MG: Performed by: ANESTHESIOLOGY

## 2024-05-24 PROCEDURE — 88305 TISSUE EXAM BY PATHOLOGIST: CPT | Performed by: SURGERY

## 2024-05-24 PROCEDURE — 25010000002 CEFAZOLIN PER 500 MG: Performed by: SURGERY

## 2024-05-24 PROCEDURE — 25810000003 LACTATED RINGERS PER 1000 ML: Performed by: SURGERY

## 2024-05-24 PROCEDURE — 81025 URINE PREGNANCY TEST: CPT | Performed by: SURGERY

## 2024-05-24 PROCEDURE — 25010000002 PROPOFOL 1000 MG/100ML EMULSION

## 2024-05-24 PROCEDURE — 25010000002 MIDAZOLAM PER 1 MG: Performed by: ANESTHESIOLOGY

## 2024-05-24 RX ORDER — LABETALOL HYDROCHLORIDE 5 MG/ML
5 INJECTION, SOLUTION INTRAVENOUS
Status: DISCONTINUED | OUTPATIENT
Start: 2024-05-24 | End: 2024-05-24 | Stop reason: HOSPADM

## 2024-05-24 RX ORDER — SODIUM CHLORIDE 0.9 % (FLUSH) 0.9 %
3 SYRINGE (ML) INJECTION EVERY 12 HOURS SCHEDULED
Status: DISCONTINUED | OUTPATIENT
Start: 2024-05-24 | End: 2024-05-24 | Stop reason: HOSPADM

## 2024-05-24 RX ORDER — FLUMAZENIL 0.1 MG/ML
0.2 INJECTION INTRAVENOUS AS NEEDED
Status: DISCONTINUED | OUTPATIENT
Start: 2024-05-24 | End: 2024-05-24 | Stop reason: HOSPADM

## 2024-05-24 RX ORDER — TRAMADOL HYDROCHLORIDE 50 MG/1
50 TABLET ORAL EVERY 6 HOURS PRN
Qty: 6 TABLET | Refills: 0 | Status: SHIPPED | OUTPATIENT
Start: 2024-05-24

## 2024-05-24 RX ORDER — FENTANYL CITRATE 50 UG/ML
50 INJECTION, SOLUTION INTRAMUSCULAR; INTRAVENOUS
Status: DISCONTINUED | OUTPATIENT
Start: 2024-05-24 | End: 2024-05-24 | Stop reason: HOSPADM

## 2024-05-24 RX ORDER — SODIUM CHLORIDE, SODIUM LACTATE, POTASSIUM CHLORIDE, CALCIUM CHLORIDE 600; 310; 30; 20 MG/100ML; MG/100ML; MG/100ML; MG/100ML
1000 INJECTION, SOLUTION INTRAVENOUS CONTINUOUS
Status: DISCONTINUED | OUTPATIENT
Start: 2024-05-24 | End: 2024-05-24 | Stop reason: HOSPADM

## 2024-05-24 RX ORDER — SODIUM CHLORIDE 0.9 % (FLUSH) 0.9 %
3-10 SYRINGE (ML) INJECTION AS NEEDED
Status: DISCONTINUED | OUTPATIENT
Start: 2024-05-24 | End: 2024-05-24 | Stop reason: HOSPADM

## 2024-05-24 RX ORDER — LIDOCAINE HYDROCHLORIDE 10 MG/ML
0.5 INJECTION, SOLUTION EPIDURAL; INFILTRATION; INTRACAUDAL; PERINEURAL ONCE AS NEEDED
Status: DISCONTINUED | OUTPATIENT
Start: 2024-05-24 | End: 2024-05-24 | Stop reason: HOSPADM

## 2024-05-24 RX ORDER — SODIUM CHLORIDE 0.9 % (FLUSH) 0.9 %
3 SYRINGE (ML) INJECTION AS NEEDED
Status: DISCONTINUED | OUTPATIENT
Start: 2024-05-24 | End: 2024-05-24 | Stop reason: HOSPADM

## 2024-05-24 RX ORDER — DROPERIDOL 2.5 MG/ML
0.62 INJECTION, SOLUTION INTRAMUSCULAR; INTRAVENOUS ONCE AS NEEDED
Status: DISCONTINUED | OUTPATIENT
Start: 2024-05-24 | End: 2024-05-24 | Stop reason: HOSPADM

## 2024-05-24 RX ORDER — ONDANSETRON 2 MG/ML
4 INJECTION INTRAMUSCULAR; INTRAVENOUS ONCE AS NEEDED
Status: DISCONTINUED | OUTPATIENT
Start: 2024-05-24 | End: 2024-05-24 | Stop reason: HOSPADM

## 2024-05-24 RX ORDER — IBUPROFEN 600 MG/1
600 TABLET ORAL EVERY 6 HOURS PRN
Status: DISCONTINUED | OUTPATIENT
Start: 2024-05-24 | End: 2024-05-24 | Stop reason: HOSPADM

## 2024-05-24 RX ORDER — OXYCODONE HYDROCHLORIDE 5 MG/1
5 TABLET ORAL ONCE AS NEEDED
Status: DISCONTINUED | OUTPATIENT
Start: 2024-05-24 | End: 2024-05-24 | Stop reason: HOSPADM

## 2024-05-24 RX ORDER — LIDOCAINE HYDROCHLORIDE 20 MG/ML
INJECTION, SOLUTION EPIDURAL; INFILTRATION; INTRACAUDAL; PERINEURAL AS NEEDED
Status: DISCONTINUED | OUTPATIENT
Start: 2024-05-24 | End: 2024-05-24 | Stop reason: SURG

## 2024-05-24 RX ORDER — PROPOFOL 10 MG/ML
INJECTION, EMULSION INTRAVENOUS AS NEEDED
Status: DISCONTINUED | OUTPATIENT
Start: 2024-05-24 | End: 2024-05-24 | Stop reason: SURG

## 2024-05-24 RX ORDER — NALOXONE HCL 0.4 MG/ML
0.4 VIAL (ML) INJECTION AS NEEDED
Status: DISCONTINUED | OUTPATIENT
Start: 2024-05-24 | End: 2024-05-24 | Stop reason: HOSPADM

## 2024-05-24 RX ORDER — MIDAZOLAM HYDROCHLORIDE 1 MG/ML
2 INJECTION INTRAMUSCULAR; INTRAVENOUS
Status: DISCONTINUED | OUTPATIENT
Start: 2024-05-24 | End: 2024-05-24 | Stop reason: HOSPADM

## 2024-05-24 RX ORDER — SODIUM CHLORIDE 9 MG/ML
40 INJECTION, SOLUTION INTRAVENOUS AS NEEDED
Status: DISCONTINUED | OUTPATIENT
Start: 2024-05-24 | End: 2024-05-24 | Stop reason: HOSPADM

## 2024-05-24 RX ORDER — LIDOCAINE HYDROCHLORIDE AND EPINEPHRINE 10; 10 MG/ML; UG/ML
INJECTION, SOLUTION INFILTRATION; PERINEURAL AS NEEDED
Status: DISCONTINUED | OUTPATIENT
Start: 2024-05-24 | End: 2024-05-24 | Stop reason: HOSPADM

## 2024-05-24 RX ORDER — DEXAMETHASONE SODIUM PHOSPHATE 4 MG/ML
4 INJECTION, SOLUTION INTRA-ARTICULAR; INTRALESIONAL; INTRAMUSCULAR; INTRAVENOUS; SOFT TISSUE ONCE AS NEEDED
Status: COMPLETED | OUTPATIENT
Start: 2024-05-24 | End: 2024-05-24

## 2024-05-24 RX ORDER — SODIUM CHLORIDE, SODIUM LACTATE, POTASSIUM CHLORIDE, CALCIUM CHLORIDE 600; 310; 30; 20 MG/100ML; MG/100ML; MG/100ML; MG/100ML
100 INJECTION, SOLUTION INTRAVENOUS CONTINUOUS
Status: DISCONTINUED | OUTPATIENT
Start: 2024-05-24 | End: 2024-05-24 | Stop reason: HOSPADM

## 2024-05-24 RX ORDER — MAGNESIUM HYDROXIDE 1200 MG/15ML
LIQUID ORAL AS NEEDED
Status: DISCONTINUED | OUTPATIENT
Start: 2024-05-24 | End: 2024-05-24 | Stop reason: HOSPADM

## 2024-05-24 RX ADMIN — PROPOFOL INJECTABLE EMULSION 130 MCG/KG/MIN: 10 INJECTION, EMULSION INTRAVENOUS at 09:38

## 2024-05-24 RX ADMIN — SODIUM CHLORIDE, POTASSIUM CHLORIDE, SODIUM LACTATE AND CALCIUM CHLORIDE 1000 ML: 600; 310; 30; 20 INJECTION, SOLUTION INTRAVENOUS at 08:35

## 2024-05-24 RX ADMIN — PROPOFOL INJECTABLE EMULSION 70 MG: 10 INJECTION, EMULSION INTRAVENOUS at 09:37

## 2024-05-24 RX ADMIN — CEFAZOLIN 2000 MG: 2 INJECTION, POWDER, FOR SOLUTION INTRAMUSCULAR; INTRAVENOUS at 09:38

## 2024-05-24 RX ADMIN — MIDAZOLAM 2 MG: 1 INJECTION INTRAMUSCULAR; INTRAVENOUS at 09:31

## 2024-05-24 RX ADMIN — DEXAMETHASONE SODIUM PHOSPHATE 4 MG: 4 INJECTION INTRA-ARTICULAR; INTRALESIONAL; INTRAMUSCULAR; INTRAVENOUS; SOFT TISSUE at 09:31

## 2024-05-24 RX ADMIN — LIDOCAINE HYDROCHLORIDE 80 MG: 20 INJECTION, SOLUTION EPIDURAL; INFILTRATION; INTRACAUDAL; PERINEURAL at 09:37

## 2024-05-24 NOTE — ANESTHESIA PREPROCEDURE EVALUATION
Anesthesia Evaluation     Patient summary reviewed   no history of anesthetic complications:   NPO Solid Status: > 8 hours  NPO Liquid Status: > 8 hours           Airway   Mallampati: I  TM distance: >3 FB  Neck ROM: full  No difficulty expected  Dental - normal exam     Pulmonary - negative pulmonary ROS   Cardiovascular - negative cardio ROS        Neuro/Psych  (+) psychiatric history Anxiety  GI/Hepatic/Renal/Endo - negative ROS     Musculoskeletal (-) negative ROS    Abdominal    Substance History      OB/GYN          Other                      Anesthesia Plan    ASA 2     MAC     intravenous induction     Anesthetic plan, risks, benefits, and alternatives have been provided, discussed and informed consent has been obtained with: patient.    CODE STATUS:

## 2024-05-24 NOTE — OP NOTE
LOWER EXTREMITY EXCISION LESION/CYST  Procedure Report    Patient Name:  Natalie Lawrence  YOB: 1975    Date of Surgery:  5/24/2024    Attending Surgeon: Hector Vazquez MD     PROCEDURE:  1.  Excisional biopsy of left great toe lesion, 1.1 cm   2.  Adjacent tissue transfer, dorsal great toe rotation advancement flap, 4 cm²    PREOPERATIVE DIAGNOSIS:  Neoplasm of uncertain behavior, left great toe    POSTOPERATIVE DIAGNOSIS:  Same    ANESTHESIA:  MAC with local    INDICATION FOR PROCEDURE:  This is a 48-year-old female with an enlarging and pigmented lesion on her left dorsal great toe.  We will proceed with excisional biopsy as planned.    DESCRIPTION OF PROCEDURE:  The lesion was excised circumferentially from the dorsal lateral border of the great toe with adjacent margins of grossly normal dermis.  Excision was carried full-thickness through the dermis, and the specimen was elevated in the subcutaneous plane.  The defect was well beyond what could be closed primarily in this location, so a proximately pedicle dorsal great toe rotation advancement flap was elevated utilizing a backcut along the mid axial line of the great toe to the first webspace and a transverse backcut along the dorsum of the great toe proximal to the eponychium.  The entirety of the dorsal toe skin was then elevated to create a rotation advancement flap.  The flap was transposed over the defect and was inset with interrupted 5-0 Vicryl in the deep dermis and a combination of horizontal mattress and simple running 5-0 nylon.  The site was dressed with antibiotic ointment, sterile gauze, and silk tape.    BLOOD LOSS:   5 mL    COMPLICATIONS:   None    SPECIMENS:          Left great toe lesion, stitch at 12:00    DISPOSITION:   Home and self-care.  Return to clinic in 12 days.    STAFF:  Surgeon(s):  Hector Vazquez MD           Electronically signed by Hector Vazquez MD, 05/24/24, 10:12 AM CDT.

## 2024-05-24 NOTE — ANESTHESIA POSTPROCEDURE EVALUATION
Patient: Natalie Lawrence    Procedure Summary       Date: 05/24/24 Room / Location:  [facility] 09 /  [facility]    Anesthesia Start: 0934 Anesthesia Stop: 1012    Procedure: EXCISIONAL BIOPSY OF LEFT GREAT TOE LESION (Left) Diagnosis:     Surgeons: Hector Vazquez MD Provider: Dalia Lemus CRNA    Anesthesia Type: MAC ASA Status: 2            Anesthesia Type: MAC    Vitals  Vitals Value Taken Time   /65 05/24/24 1046   Temp 96.6 °F (35.9 °C) 05/24/24 1011   Pulse 66 05/24/24 1100   Resp 18 05/24/24 1011   SpO2 96 % 05/24/24 1100   Vitals shown include unfiled device data.        Post Anesthesia Care and Evaluation    Patient location during evaluation: PHASE II  Patient participation: complete - patient participated  Level of consciousness: awake and alert  Pain management: adequate    Airway patency: patent  Anesthetic complications: No anesthetic complications    Cardiovascular status: acceptable  Respiratory status: acceptable  Hydration status: acceptable    Comments: Blood pressure 101/65, pulse 67, temperature 96.6 °F (35.9 °C), temperature source Temporal, resp. rate 18, last menstrual period 05/01/2024, SpO2 96%, not currently breastfeeding.    Pt discharged from PACU based on george score >8

## 2024-05-24 NOTE — DISCHARGE INSTRUCTIONS
General Postoperative Instructions  Hector Vazquez MD      ENCOURAGED ACTIVITY  You should make an effort to spend time on your feet as soon as you safely can.  This may require assistance at first.    Standing and walking will dramatically decrease the risk of blood clots.    You should try to walk for a few minutes every hour during waking hours.  You do not need to wake up through the night to do this.    Please sleep with your foot elevated for the next 2 nights.    DRESSINGS AND FOLLOW-UP  Please keep your dressings in place for 48 hours.  Please keep your dressings clean and dry at all times.  After removing your dressings, you do not need to keep your incision covered when you are at home.  When wearing shoes, please place a dry gauze over the incision in between your toes.  It is ok to resume normal showering in 2 days.  Do not submerge your incisions below water for 2 weeks.  You will have scheduled follow-up in 12 days.    MEDICATIONS  Take all of your medications as instructed.  Please take your prescribed pain medication only as needed.   Pain medication can upset your stomach so be sure to eat something when taking them.   Pain medication can be constipating so be sure to drink plenty of fluids. You should also take an over-the-counter laxative or stool softener until normal bowel movements resume.   Over the counter pain medication is ok to take with or instead of your prescribed pain medication.    Questions or Concerns    If you have additional questions or concerns, please contact [facility] at [phone] during or after office hours. After hours, you will have the option to press #1 to speak to the answering service.  They will be able to put you in touch with Dr. Vazquez if necessary.     In the case of an emergency, please call 911.    Signs and Symptoms of Infection and/or Complication:    Rapid or asymmetric swelling  Swelling that worsens after the first  48 hours  Redness and warmth developing on previously normal-appearing skin  Drainage other than scant blood or blood-tinged clear fluid  Fever or chills  Nausea and vomiting or diarrhea   Separation of the incision  Bleeding that does not stop with pressure    Signs and Symptoms of a Potential Emergency:    If you experience any of the following during your postoperative recovery, this may represent an emergency.  Please call 911 and seek immediate medical attention:    Loss of consciousness or “blacking out”  Worsening shortness of breath or inability to catch your breath  No onset chest, shoulder, or neck pain  Leg pain or swelling  Light-headedness or dizziness when attempting to stand or walk

## 2024-05-28 LAB
CYTO UR: NORMAL
LAB AP CASE REPORT: NORMAL
Lab: NORMAL
PATH REPORT.FINAL DX SPEC: NORMAL
PATH REPORT.GROSS SPEC: NORMAL

## 2024-08-26 ENCOUNTER — OFFICE VISIT (OUTPATIENT)
Dept: OBSTETRICS AND GYNECOLOGY | Age: 49
End: 2024-08-26
Payer: COMMERCIAL

## 2024-08-26 VITALS
WEIGHT: 173 LBS | DIASTOLIC BLOOD PRESSURE: 88 MMHG | SYSTOLIC BLOOD PRESSURE: 148 MMHG | BODY MASS INDEX: 24.77 KG/M2 | HEIGHT: 70 IN

## 2024-08-26 DIAGNOSIS — E55.9 VITAMIN D DEFICIENCY: ICD-10-CM

## 2024-08-26 DIAGNOSIS — Z01.419 WELL WOMAN EXAM WITH ROUTINE GYNECOLOGICAL EXAM: Primary | ICD-10-CM

## 2024-08-26 DIAGNOSIS — Z12.31 ENCOUNTER FOR SCREENING MAMMOGRAM FOR BREAST CANCER: ICD-10-CM

## 2024-08-26 DIAGNOSIS — F41.9 ANXIETY: ICD-10-CM

## 2024-08-26 PROCEDURE — 99396 PREV VISIT EST AGE 40-64: CPT | Performed by: NURSE PRACTITIONER

## 2024-08-26 PROCEDURE — 87624 HPV HI-RISK TYP POOLED RSLT: CPT | Performed by: NURSE PRACTITIONER

## 2024-08-26 PROCEDURE — 99459 PELVIC EXAMINATION: CPT | Performed by: NURSE PRACTITIONER

## 2024-08-26 PROCEDURE — 99213 OFFICE O/P EST LOW 20 MIN: CPT | Performed by: NURSE PRACTITIONER

## 2024-08-26 PROCEDURE — G0123 SCREEN CERV/VAG THIN LAYER: HCPCS | Performed by: NURSE PRACTITIONER

## 2024-08-26 RX ORDER — ALPRAZOLAM 0.25 MG
0.25 TABLET ORAL 3 TIMES DAILY PRN
Qty: 90 TABLET | Refills: 0 | Status: SHIPPED | OUTPATIENT
Start: 2024-08-26

## 2024-08-26 NOTE — PROGRESS NOTES
"Subjective     Natalie Lawrence is a 49 y.o. female    History of Present Illness  Patient is here today for yearly checkup.  She has no complaints.  She is currently on her menses.  Her Pap smear last year did show endometrial cells with a follow-up biopsy that was normal.  She is offered to come back for the Pap but she wishes to proceed.  Gynecologic Exam  The patient's pertinent negatives include no pelvic pain, vaginal bleeding or vaginal discharge. Pertinent negatives include no abdominal pain, anorexia, back pain, chills, constipation, diarrhea, discolored urine, dysuria, fever, flank pain, frequency, headaches, hematuria, joint pain, joint swelling, nausea, painful intercourse, rash, sore throat, urgency or vomiting. She is sexually active. She uses condoms for contraception. Her menstrual history has been regular.         /88   Ht 178.2 cm (70.16\")   Wt 78.5 kg (173 lb)   LMP 08/26/2024 (Exact Date)   BMI 24.71 kg/m²     Outpatient Encounter Medications as of 8/26/2024   Medication Sig Dispense Refill    ALPRAZolam (XANAX) 0.25 MG tablet Take 1 tablet by mouth 3 (Three) Times a Day As Needed for Anxiety. 90 tablet 0    [DISCONTINUED] ALPRAZolam (XANAX) 0.25 MG tablet Take 1 tablet by mouth 3 (Three) Times a Day As Needed for Anxiety. 90 tablet 0    [DISCONTINUED] cholecalciferol (VITAMIN D3) 1000 UNITS tablet Take 5 tablets by mouth Daily.      [DISCONTINUED] Multiple Vitamins-Minerals (DAILY VITAMIN FORMULA+MINERALS PO) Take 1 tablet by mouth Daily.      [DISCONTINUED] Odthoaancfmf-Ugfyvni-D-Probiot (AZO URINARY TRACT SUPPORT PO) Take 1 capsule by mouth Daily.      [DISCONTINUED] traMADol (Ultram) 50 MG tablet Take 1 tablet by mouth Every 6 (Six) Hours As Needed for Moderate Pain or Severe Pain for up to 6 doses. 6 tablet 0     No facility-administered encounter medications on file as of 8/26/2024.       Past Medical History  Past Medical History:   Diagnosis Date    Anxiety     COVID     Macular " degeneration     Mono exposure     Panic attack     Perimenopausal     Spinal headache     Urinary tract infection        Surgical History  Past Surgical History:   Procedure Laterality Date     SECTION      CYSTOSCOPY      DILATATION AND CURETTAGE      LEG EXCISION LESION/CYST Left 2024    Procedure: EXCISIONAL BIOPSY OF LEFT GREAT TOE LESION;  Surgeon: Hector Vazquez MD;  Location: [facility] OR;  Service: Plastics;  Laterality: Left;    LYMPH NODE BIOPSY      WISDOM TOOTH EXTRACTION         Family History  Family History   Problem Relation Age of Onset    Heart disease Father     Diabetes Father     Hypertension Father     Prostate cancer Father 60    No Known Problems Mother     Stroke Paternal Grandfather     No Known Problems Son     Breast cancer Neg Hx     Ovarian cancer Neg Hx     Uterine cancer Neg Hx     Colon cancer Neg Hx     Melanoma Neg Hx        The following portions of the patient's history were reviewed and updated as appropriate: allergies, current medications, past family history, past medical history, past social history, past surgical history, and problem list.    Review of Systems   Constitutional:  Negative for activity change, appetite change, chills, diaphoresis, fatigue, fever, unexpected weight gain and unexpected weight loss.   HENT:  Negative for congestion, dental problem, drooling, ear discharge, ear pain, facial swelling, hearing loss, mouth sores, nosebleeds, postnasal drip, rhinorrhea, sinus pressure, sneezing, sore throat, swollen glands, tinnitus, trouble swallowing and voice change.    Eyes:  Negative for blurred vision, double vision, photophobia, pain, discharge, redness, itching and visual disturbance.   Respiratory:  Negative for apnea, cough, choking, chest tightness, shortness of breath, wheezing and stridor.    Cardiovascular:  Negative for chest pain, palpitations and leg swelling.   Gastrointestinal:  Negative for abdominal distention, abdominal pain, anal  bleeding, anorexia, blood in stool, constipation, diarrhea, nausea, rectal pain, vomiting, GERD and indigestion.   Endocrine: Negative for cold intolerance, heat intolerance, polydipsia, polyphagia and polyuria.   Genitourinary:  Negative for amenorrhea, breast discharge, breast lump, breast pain, decreased libido, decreased urine volume, difficulty urinating, dyspareunia, dysuria, flank pain, frequency, genital sores, hematuria, menstrual problem, pelvic pain, pelvic pressure, urgency, urinary incontinence, vaginal bleeding, vaginal discharge and vaginal pain.   Musculoskeletal:  Negative for arthralgias, back pain, gait problem, joint pain, joint swelling, myalgias, neck pain, neck stiffness and bursitis.   Skin:  Negative for color change, dry skin and rash.   Allergic/Immunologic: Negative for environmental allergies, food allergies and immunocompromised state.   Neurological:  Negative for dizziness, tremors, seizures, syncope, facial asymmetry, speech difficulty, weakness, light-headedness, numbness, headache, memory problem and confusion.   Hematological:  Negative for adenopathy. Does not bruise/bleed easily.   Psychiatric/Behavioral:  Negative for agitation, behavioral problems, decreased concentration, dysphoric mood, hallucinations, self-injury, sleep disturbance, suicidal ideas, negative for hyperactivity, depressed mood and stress. The patient is not nervous/anxious.        Objective   Physical Exam  Vitals and nursing note reviewed. Exam conducted with a chaperone present.   Constitutional:       General: She is not in acute distress.     Appearance: She is well-developed. She is not diaphoretic.   HENT:      Head: Normocephalic.      Right Ear: External ear normal.      Left Ear: External ear normal.      Nose: Nose normal.   Eyes:      General: No scleral icterus.        Right eye: No discharge.         Left eye: No discharge.      Conjunctiva/sclera: Conjunctivae normal.      Pupils: Pupils are  equal, round, and reactive to light.   Neck:      Thyroid: No thyromegaly.      Vascular: No carotid bruit.      Trachea: No tracheal deviation.   Cardiovascular:      Rate and Rhythm: Normal rate and regular rhythm.      Heart sounds: Normal heart sounds. No murmur heard.  Pulmonary:      Effort: Pulmonary effort is normal. No respiratory distress.      Breath sounds: Normal breath sounds. No wheezing.   Chest:   Breasts:     Breasts are symmetrical.      Right: Normal. No swelling, bleeding, inverted nipple, mass, nipple discharge, skin change or tenderness.      Left: Normal. No swelling, bleeding, inverted nipple, mass, nipple discharge, skin change or tenderness.   Abdominal:      General: There is no distension.      Palpations: Abdomen is soft. There is no mass.      Tenderness: There is no abdominal tenderness. There is no right CVA tenderness, left CVA tenderness or guarding.      Hernia: No hernia is present. There is no hernia in the left inguinal area or right inguinal area.   Genitourinary:     General: Normal vulva.      Exam position: Lithotomy position.      Labia:         Right: No rash, tenderness, lesion or injury.         Left: No rash, tenderness, lesion or injury.       Vagina: No signs of injury and foreign body. Bleeding present. No vaginal discharge, erythema or tenderness.      Cervix: Normal.      Uterus: Normal. Not enlarged, not fixed and not tender.       Adnexa: Right adnexa normal and left adnexa normal.        Right: No mass, tenderness or fullness.          Left: No mass, tenderness or fullness.        Rectum: Normal. No mass.      Comments: Patient is on her period today  BSU normal  Urethral meatus  Normal  Perineum  Normal  Musculoskeletal:         General: No tenderness. Normal range of motion.      Cervical back: Normal range of motion and neck supple.   Lymphadenopathy:      Head:      Right side of head: No submental, submandibular, tonsillar, preauricular, posterior  auricular or occipital adenopathy.      Left side of head: No submental, submandibular, tonsillar, preauricular, posterior auricular or occipital adenopathy.      Cervical: No cervical adenopathy.      Right cervical: No superficial, deep or posterior cervical adenopathy.     Left cervical: No superficial, deep or posterior cervical adenopathy.      Upper Body:      Right upper body: No supraclavicular, axillary or pectoral adenopathy.      Left upper body: No supraclavicular, axillary or pectoral adenopathy.      Lower Body: No right inguinal adenopathy. No left inguinal adenopathy.   Skin:     General: Skin is warm and dry.      Findings: No bruising, erythema or rash.   Neurological:      Mental Status: She is alert and oriented to person, place, and time.      Coordination: Coordination normal.   Psychiatric:         Mood and Affect: Mood normal.         Behavior: Behavior normal.         Thought Content: Thought content normal.         Judgment: Judgment normal.         PHQ-9 Depression Screening  Little interest or pleasure in doing things? 0-->not at all   Feeling down, depressed, or hopeless? 0-->not at all   Trouble falling or staying asleep, or sleeping too much?     Feeling tired or having little energy?     Poor appetite or overeating?     Feeling bad about yourself - or that you are a failure or have let yourself or your family down?     Trouble concentrating on things, such as reading the newspaper or watching television?     Moving or speaking so slowly that other people could have noticed? Or the opposite - being so fidgety or restless that you have been moving around a lot more than usual?     Thoughts that you would be better off dead, or of hurting yourself in some way?     PHQ-9 Total Score 0   If you checked off any problems, how difficult have these problems made it for you to do your work, take care of things at home, or get along with other people?          Assessment & Plan   Diagnoses and  all orders for this visit:    1. Well woman exam with routine gynecological exam (Primary)  Normal GYN exam. Will have lab work. Encouraged SBE, pt is aware how to do self breast exam and the importance of same. Discussed weight management and importance of maintaining a healthy weight. Discussed Vitamin D intake and the importance of adequate vitamin D for both Bone Health and a healthy immune system.  Discussed Daily exercise and the importance of same, in regards to a healthy heart as well as helping to maintain her weight and improving her mental health.  BMI 24.7.  Colonoscopy will be scheduled with gastro..  Mammogram will be scheduled at [facility] per patient request.  Pap smear is done after  we discussed ASCCP guidelines.  After informed decision patient wishes to proceed with the Pap smear.  Patient has had previous endometrial cells on her Pap smear last year and had to have a biopsy.  The biopsy was negative.  She is offered today to back for the Pap smear as she is on her cycle but she prefers to proceed.        -     Liquid-based Pap Smear, Screening  -     CBC & Differential  -     Comprehensive Metabolic Panel  -     Lipid Panel With LDL / HDL Ratio  -     TSH  -     T3, Uptake  -     T4, Free  -     Hemoglobin A1c  -     Urine Culture - , Urine, Clean Catch  -     UA / M With / Rflx Culture([facility] ONLY) - Urine, Clean Catch  -     Hepatitis C Antibody  -     Ambulatory Referral For Screening Colonoscopy    2. Encounter for screening mammogram for breast cancer  Comments:  Patient will have a mammogram at [facility] per her request.  She is given an order today.  Orders:  -     Mammo Screening Digital Tomosynthesis Bilateral With CAD; Future    3. Vitamin D deficiency  Comments:  Patient will have lab work drawn.  Orders:  -     Vitamin D,25-Hydroxy    4. Anxiety  Comments:  Patient has had quite a bit of anxiety due to her bladder issues.  She is given a refill on Xanax today.  Natanael is  reviewed.  Orders:  -     ALPRAZolam (XANAX) 0.25 MG tablet; Take 1 tablet by mouth 3 (Three) Times a Day As Needed for Anxiety.  Dispense: 90 tablet; Refill: 0         BMI is within normal parameters. No other follow-up for BMI required.      Nancy Erwin, APRN  8/26/2024

## 2024-08-28 LAB
25(OH)D3+25(OH)D2 SERPL-MCNC: 40.9 NG/ML (ref 30–100)
ALBUMIN SERPL-MCNC: 4.3 G/DL (ref 3.5–5.2)
ALBUMIN/GLOB SERPL: 1.5 G/DL
ALP SERPL-CCNC: 57 U/L (ref 39–117)
ALT SERPL-CCNC: 16 U/L (ref 1–33)
APPEARANCE UR: CLEAR
AST SERPL-CCNC: 18 U/L (ref 1–32)
BACTERIA #/AREA URNS HPF: ABNORMAL /[HPF]
BACTERIA UR CULT: NORMAL
BACTERIA UR CULT: NORMAL
BASOPHILS # BLD AUTO: 0.03 10*3/MM3 (ref 0–0.2)
BASOPHILS NFR BLD AUTO: 0.8 % (ref 0–1.5)
BILIRUB SERPL-MCNC: 0.5 MG/DL (ref 0–1.2)
BILIRUB UR QL STRIP: NEGATIVE
BUN SERPL-MCNC: 11 MG/DL (ref 6–20)
BUN/CREAT SERPL: 14.9 (ref 7–25)
CALCIUM SERPL-MCNC: 8.9 MG/DL (ref 8.6–10.5)
CASTS URNS QL MICRO: ABNORMAL /LPF
CHLORIDE SERPL-SCNC: 104 MMOL/L (ref 98–107)
CHOLEST SERPL-MCNC: 144 MG/DL (ref 0–200)
CO2 SERPL-SCNC: 26.3 MMOL/L (ref 22–29)
COLOR UR: YELLOW
CREAT SERPL-MCNC: 0.74 MG/DL (ref 0.57–1)
EGFRCR SERPLBLD CKD-EPI 2021: 99.9 ML/MIN/1.73
EOSINOPHIL # BLD AUTO: 0.11 10*3/MM3 (ref 0–0.4)
EOSINOPHIL NFR BLD AUTO: 2.8 % (ref 0.3–6.2)
EPI CELLS #/AREA URNS HPF: ABNORMAL /HPF (ref 0–10)
ERYTHROCYTE [DISTWIDTH] IN BLOOD BY AUTOMATED COUNT: 12 % (ref 12.3–15.4)
GLOBULIN SER CALC-MCNC: 2.8 GM/DL
GLUCOSE SERPL-MCNC: 92 MG/DL (ref 65–99)
GLUCOSE UR QL STRIP: NEGATIVE
HBA1C MFR BLD: 5 % (ref 4.8–5.6)
HCT VFR BLD AUTO: 41.4 % (ref 34–46.6)
HCV IGG SERPL QL IA: NON REACTIVE
HDLC SERPL-MCNC: 57 MG/DL (ref 40–60)
HGB BLD-MCNC: 13.2 G/DL (ref 12–15.9)
HGB UR QL STRIP: ABNORMAL
IMM GRANULOCYTES # BLD AUTO: 0.01 10*3/MM3 (ref 0–0.05)
IMM GRANULOCYTES NFR BLD AUTO: 0.3 % (ref 0–0.5)
KETONES UR QL STRIP: NEGATIVE
LDLC SERPL CALC-MCNC: 76 MG/DL (ref 0–100)
LDLC/HDLC SERPL: 1.34 {RATIO}
LEUKOCYTE ESTERASE UR QL STRIP: NEGATIVE
LYMPHOCYTES # BLD AUTO: 1.26 10*3/MM3 (ref 0.7–3.1)
LYMPHOCYTES NFR BLD AUTO: 32.5 % (ref 19.6–45.3)
MCH RBC QN AUTO: 29.5 PG (ref 26.6–33)
MCHC RBC AUTO-ENTMCNC: 31.9 G/DL (ref 31.5–35.7)
MCV RBC AUTO: 92.6 FL (ref 79–97)
MICRO URNS: ABNORMAL
MONOCYTES # BLD AUTO: 0.37 10*3/MM3 (ref 0.1–0.9)
MONOCYTES NFR BLD AUTO: 9.5 % (ref 5–12)
NEUTROPHILS # BLD AUTO: 2.1 10*3/MM3 (ref 1.7–7)
NEUTROPHILS NFR BLD AUTO: 54.1 % (ref 42.7–76)
NITRITE UR QL STRIP: NEGATIVE
NRBC BLD AUTO-RTO: 0 /100 WBC (ref 0–0.2)
PH UR STRIP: 5.5 [PH] (ref 5–7.5)
PLATELET # BLD AUTO: 235 10*3/MM3 (ref 140–450)
POTASSIUM SERPL-SCNC: 4.1 MMOL/L (ref 3.5–5.2)
PROT SERPL-MCNC: 7.1 G/DL (ref 6–8.5)
PROT UR QL STRIP: ABNORMAL
RBC # BLD AUTO: 4.47 10*6/MM3 (ref 3.77–5.28)
RBC #/AREA URNS HPF: >30 /HPF (ref 0–2)
SODIUM SERPL-SCNC: 139 MMOL/L (ref 136–145)
SP GR UR STRIP: 1.03 (ref 1–1.03)
T3RU NFR SERPL: 29 % (ref 24–39)
T4 FREE SERPL-MCNC: 1.12 NG/DL (ref 0.92–1.68)
TRIGL SERPL-MCNC: 53 MG/DL (ref 0–150)
TSH SERPL DL<=0.005 MIU/L-ACNC: 1.7 UIU/ML (ref 0.27–4.2)
URINALYSIS REFLEX: ABNORMAL
UROBILINOGEN UR STRIP-MCNC: 0.2 MG/DL (ref 0.2–1)
VLDLC SERPL CALC-MCNC: 11 MG/DL (ref 5–40)
WBC # BLD AUTO: 3.88 10*3/MM3 (ref 3.4–10.8)
WBC #/AREA URNS HPF: ABNORMAL /HPF (ref 0–5)

## 2024-08-29 LAB
GEN CATEG CVX/VAG CYTO-IMP: NORMAL
HPV I/H RISK 4 DNA CVX QL PROBE+SIG AMP: NOT DETECTED
LAB AP CASE REPORT: NORMAL
LAB AP GYN ADDITIONAL INFORMATION: NORMAL
LAB AP GYN OTHER FINDINGS: NORMAL
Lab: NORMAL
PATH INTERP SPEC-IMP: NORMAL
STAT OF ADQ CVX/VAG CYTO-IMP: NORMAL

## 2024-08-29 RX ORDER — ERGOCALCIFEROL 1.25 MG/1
50000 CAPSULE, LIQUID FILLED ORAL WEEKLY
Qty: 5 CAPSULE | Refills: 12 | Status: SHIPPED | OUTPATIENT
Start: 2024-08-29

## 2024-09-04 ENCOUNTER — TELEPHONE (OUTPATIENT)
Dept: OBSTETRICS AND GYNECOLOGY | Age: 49
End: 2024-09-04
Payer: COMMERCIAL

## 2024-09-05 DIAGNOSIS — Z12.31 ENCOUNTER FOR SCREENING MAMMOGRAM FOR BREAST CANCER: ICD-10-CM

## 2024-09-10 ENCOUNTER — PROCEDURE VISIT (OUTPATIENT)
Dept: OBSTETRICS AND GYNECOLOGY | Age: 49
End: 2024-09-10
Payer: COMMERCIAL

## 2024-09-10 VITALS
SYSTOLIC BLOOD PRESSURE: 150 MMHG | BODY MASS INDEX: 24.77 KG/M2 | HEIGHT: 70 IN | WEIGHT: 173 LBS | DIASTOLIC BLOOD PRESSURE: 82 MMHG

## 2024-09-10 DIAGNOSIS — R87.619 ENDOMETRIAL CELLS ON CERVICAL PAP SMEAR INCONSISTENT W/LMP: Primary | ICD-10-CM

## 2024-09-10 PROCEDURE — 99213 OFFICE O/P EST LOW 20 MIN: CPT | Performed by: NURSE PRACTITIONER

## 2024-09-10 PROCEDURE — 99459 PELVIC EXAMINATION: CPT | Performed by: NURSE PRACTITIONER

## 2024-09-10 PROCEDURE — 58100 BIOPSY OF UTERUS LINING: CPT | Performed by: NURSE PRACTITIONER

## 2024-09-10 PROCEDURE — 88305 TISSUE EXAM BY PATHOLOGIST: CPT | Performed by: NURSE PRACTITIONER

## 2024-09-10 NOTE — PROGRESS NOTES
Procedure   Procedures   An endometrial biopsy was performed in the office today.  The cervix was visualized with a speculum and prepped with Betadine.  The anterior lip was grasped with a tenaculum and a standard endometrial sampling Pipelle was passed into the uterine cavity.  The patient experienced mild cramping and the uterus sounded to 7 cm.  A large amount of tissue was obtained with 1 pass.  The tenaculum was removed and the site was hemostatic.  She tolerated the procedure well.

## 2024-09-10 NOTE — PROGRESS NOTES
"Subjective     Natalie Lawrence is a 49 y.o. female    History of Present Illness  Patient comes in today for endometrial biopsy for endometrial cells on her Pap smear.  She is still having irregular cycles.  Having some perimenopausal symptoms.  She is not on any hormone replacement.        /82   Ht 178.2 cm (70.16\")   Wt 78.5 kg (173 lb)   LMP 2024 (Exact Date)   BMI 24.71 kg/m²     Outpatient Encounter Medications as of 9/10/2024   Medication Sig Dispense Refill    ALPRAZolam (XANAX) 0.25 MG tablet Take 1 tablet by mouth 3 (Three) Times a Day As Needed for Anxiety. 90 tablet 0    vitamin D (ERGOCALCIFEROL) 1.25 MG (86751 UT) capsule capsule Take 1 capsule by mouth 1 (One) Time Per Week. 5 capsule 12     No facility-administered encounter medications on file as of 9/10/2024.       Past Medical History  Past Medical History:   Diagnosis Date    Anxiety     COVID     Macular degeneration     Mono exposure     Panic attack     Perimenopausal     Spinal headache     Urinary tract infection        Surgical History  Past Surgical History:   Procedure Laterality Date     SECTION      CYSTOSCOPY      DILATATION AND CURETTAGE      LEG EXCISION LESION/CYST Left 2024    Procedure: EXCISIONAL BIOPSY OF LEFT GREAT TOE LESION;  Surgeon: Hector Vazquze MD;  Location: [facility] OR;  Service: Plastics;  Laterality: Left;    LYMPH NODE BIOPSY      WISDOM TOOTH EXTRACTION         Family History  Family History   Problem Relation Age of Onset    Heart disease Father     Diabetes Father     Hypertension Father     Prostate cancer Father 60    No Known Problems Mother     Stroke Paternal Grandfather     No Known Problems Son     Breast cancer Neg Hx     Ovarian cancer Neg Hx     Uterine cancer Neg Hx     Colon cancer Neg Hx     Melanoma Neg Hx        The following portions of the patient's history were reviewed and updated as appropriate: allergies, current medications, past family history, past medical " history, past social history, past surgical history, and problem list.    Review of Systems   Genitourinary:  Positive for vaginal bleeding.       Objective   Physical Exam  Vitals and nursing note reviewed. Exam conducted with a chaperone present.   Constitutional:       Appearance: She is well-developed.   HENT:      Head: Normocephalic and atraumatic.   Abdominal:      General: There is no distension.      Palpations: Abdomen is soft.      Tenderness: There is no abdominal tenderness.      Hernia: There is no hernia in the left inguinal area or right inguinal area.   Genitourinary:     General: Normal vulva.      Exam position: Lithotomy position.      Labia:         Right: No rash, tenderness, lesion or injury.         Left: No rash, tenderness, lesion or injury.       Vagina: Normal. No vaginal discharge, erythema, tenderness or bleeding.      Cervix: Normal.      Uterus: Normal. Not enlarged and not tender.       Adnexa: Right adnexa normal and left adnexa normal.        Right: No mass, tenderness or fullness.          Left: No mass, tenderness or fullness.     Lymphadenopathy:      Lower Body: No right inguinal adenopathy. No left inguinal adenopathy.   Skin:     General: Skin is warm and dry.   Neurological:      Mental Status: She is alert and oriented to person, place, and time.   Psychiatric:         Mood and Affect: Mood normal.         Behavior: Behavior normal.         Thought Content: Thought content normal.         Judgment: Judgment normal.         PHQ-9 Depression Screening  Little interest or pleasure in doing things? 0-->not at all   Feeling down, depressed, or hopeless? 0-->not at all   Trouble falling or staying asleep, or sleeping too much?     Feeling tired or having little energy?     Poor appetite or overeating?     Feeling bad about yourself - or that you are a failure or have let yourself or your family down?     Trouble concentrating on things, such as reading the newspaper or watching  television?     Moving or speaking so slowly that other people could have noticed? Or the opposite - being so fidgety or restless that you have been moving around a lot more than usual?     Thoughts that you would be better off dead, or of hurting yourself in some way?     PHQ-9 Total Score 0   If you checked off any problems, how difficult have these problems made it for you to do your work, take care of things at home, or get along with other people?          Assessment & Plan   Diagnoses and all orders for this visit:    1. Endometrial cells on cervical Pap smear inconsistent w/LMP (Primary)  Comments:  Patient is here today for endometrial biopsy due to endometrial cells on her Pap smear.  She is still having irregular cycles.  Ultrasound done today and her endometrium was 8 mm.  Orders:  -     Tissue Pathology Exam         BMI is within normal parameters. No other follow-up for BMI required.      Nancy Erwin, APRN  9/10/2024

## 2024-09-12 RX ORDER — MEDROXYPROGESTERONE ACETATE 10 MG
10 TABLET ORAL DAILY
Qty: 30 TABLET | Refills: 0 | Status: SHIPPED | OUTPATIENT
Start: 2024-09-12 | End: 2024-09-22

## 2024-12-09 RX ORDER — MEDROXYPROGESTERONE ACETATE 10 MG
10 TABLET ORAL DAILY
Qty: 30 TABLET | Refills: 0 | OUTPATIENT
Start: 2024-12-09 | End: 2024-12-19

## 2025-01-02 ENCOUNTER — TELEPHONE (OUTPATIENT)
Dept: OBSTETRICS AND GYNECOLOGY | Age: 50
End: 2025-01-02

## 2025-01-02 NOTE — TELEPHONE ENCOUNTER
PROVIDER: MAIA GOODSON    CALLER: YOLANDA ALDANA    PHONE NUMBER: [phone]    REASON FOR CALL: SAME DAY CANCELLATION//NOT ABLE TO MAKE IT

## 2025-01-13 ENCOUNTER — TELEPHONE (OUTPATIENT)
Dept: OBSTETRICS AND GYNECOLOGY | Age: 50
End: 2025-01-13

## 2025-01-13 NOTE — TELEPHONE ENCOUNTER
[facility] staff attempted to follow warm transfer process and was unsuccessful     Caller: Natalie Lawrence    Relationship to patient: Self    Best call back number: [phone] (home)     Patient is needing: RESCHEDULE U/S AND F/U FROM 1/02/2025 [facility] HAS NO AVAILABILITY UNTIL 2/13/2025

## 2025-01-17 ENCOUNTER — OFFICE VISIT (OUTPATIENT)
Dept: OBSTETRICS AND GYNECOLOGY | Age: 50
End: 2025-01-17
Payer: COMMERCIAL

## 2025-01-17 VITALS
DIASTOLIC BLOOD PRESSURE: 90 MMHG | SYSTOLIC BLOOD PRESSURE: 160 MMHG | WEIGHT: 180 LBS | HEIGHT: 70 IN | BODY MASS INDEX: 25.77 KG/M2

## 2025-01-17 DIAGNOSIS — N93.8 DUB (DYSFUNCTIONAL UTERINE BLEEDING): Primary | ICD-10-CM

## 2025-01-17 PROCEDURE — 99213 OFFICE O/P EST LOW 20 MIN: CPT | Performed by: NURSE PRACTITIONER

## 2025-01-17 RX ORDER — NORETHINDRONE ACETATE AND ETHINYL ESTRADIOL .02; 1 MG/1; MG/1
1 TABLET ORAL DAILY
Qty: 28 TABLET | Refills: 12 | Status: SHIPPED | OUTPATIENT
Start: 2025-01-17

## 2025-01-17 NOTE — PROGRESS NOTES
"Subjective     Natalie Lawrence is a 49 y.o. female    History of Present Illness  Patient comes in today for follow-up of endometrial biopsy.  She had a biopsy in the fall for endometrial cells on her Pap smear.  It came back showing dyssynchronous endometrium.  She has been on progesterone for 3 months.  But states she had significant mood swings and would like to try low-dose birth control pill.        /90   Ht 178.2 cm (70.16\")   Wt 81.6 kg (180 lb)   LMP 2025 (Exact Date)   BMI 25.71 kg/m²     Outpatient Encounter Medications as of 2025   Medication Sig Dispense Refill    ALPRAZolam (XANAX) 0.25 MG tablet Take 1 tablet by mouth 3 (Three) Times a Day As Needed for Anxiety. 90 tablet 0    vitamin D (ERGOCALCIFEROL) 1.25 MG (35656 UT) capsule capsule Take 1 capsule by mouth 1 (One) Time Per Week. 5 capsule 12    norethindrone-ethinyl estradiol (Junel ) 1-20 MG-MCG per tablet Take 1 tablet by mouth Daily. 28 tablet 12     No facility-administered encounter medications on file as of 2025.       Past Medical History  Past Medical History:   Diagnosis Date    Anxiety     COVID     Macular degeneration     Mono exposure     Panic attack     Perimenopausal     Spinal headache     Urinary tract infection        Surgical History  Past Surgical History:   Procedure Laterality Date     SECTION      CYSTOSCOPY      DILATATION AND CURETTAGE      LEG EXCISION LESION/CYST Left 2024    Procedure: EXCISIONAL BIOPSY OF LEFT GREAT TOE LESION;  Surgeon: Hector Vazquez MD;  Location: [facility];  Service: Plastics;  Laterality: Left;    LYMPH NODE BIOPSY      WISDOM TOOTH EXTRACTION         Family History  Family History   Problem Relation Age of Onset    Heart disease Father     Diabetes Father     Hypertension Father     Prostate cancer Father 60    No Known Problems Mother     Stroke Paternal Grandfather     No Known Problems Son     Breast cancer Neg Hx     Ovarian cancer Neg Hx     " Uterine cancer Neg Hx     Colon cancer Neg Hx     Melanoma Neg Hx        The following portions of the patient's history were reviewed and updated as appropriate: allergies, current medications, past family history, past medical history, past social history, past surgical history, and problem list.    Review of Systems   Genitourinary:  Positive for menstrual problem.       Objective   Physical Exam  Vitals and nursing note reviewed.   Constitutional:       Appearance: She is well-developed.   HENT:      Head: Normocephalic and atraumatic.   Eyes:      General:         Right eye: No discharge.         Left eye: No discharge.      Conjunctiva/sclera: Conjunctivae normal.   Neck:      Thyroid: No thyromegaly.   Cardiovascular:      Rate and Rhythm: Normal rate and regular rhythm.      Heart sounds: Normal heart sounds.   Pulmonary:      Effort: Pulmonary effort is normal.      Breath sounds: Normal breath sounds.   Musculoskeletal:         General: Normal range of motion.      Cervical back: Normal range of motion and neck supple.   Skin:     General: Skin is warm and dry.   Neurological:      Mental Status: She is alert and oriented to person, place, and time.   Psychiatric:         Mood and Affect: Mood normal.         Behavior: Behavior normal.         Thought Content: Thought content normal.         Judgment: Judgment normal.         PHQ-9 Depression Screening  Little interest or pleasure in doing things? Not at all   Feeling down, depressed, or hopeless? Not at all   PHQ-2 Total Score 0   Trouble falling or staying asleep, or sleeping too much?     Feeling tired or having little energy?     Poor appetite or overeating?     Feeling bad about yourself - or that you are a failure or have let yourself or your family down?     Trouble concentrating on things, such as reading the newspaper or watching television?     Moving or speaking so slowly that other people could have noticed? Or the opposite - being so fidgety  or restless that you have been moving around a lot more than usual?     Thoughts that you would be better off dead, or of hurting yourself in some way?     PHQ-9 Total Score     If you checked off any problems, how difficult have these problems made it for you to do your work, take care of things at home, or get along with other people?         Assessment & Plan   Diagnoses and all orders for this visit:    1. DUB (dysfunctional uterine bleeding) (Primary)  Comments:  Patient has had a negative endometrial biopsy.  She did have a dyssynchronous endometrium.  She was given Provera and states she could not tolerate it.  She would like to try a low-dose birth control pill.  She is given Junel.  She will RTO in 3 months for follow-up and will have another ultrasound at that time to evaluate the endometrium.  Orders:  -     norethindrone-ethinyl estradiol (Junel 1/20) 1-20 MG-MCG per tablet; Take 1 tablet by mouth Daily.  Dispense: 28 tablet; Refill: 12         BMI is >= 25 and <30. (Overweight) The following options were offered after discussion;: weight loss educational material (shared in after visit summary), exercise counseling/recommendations, and nutrition counseling/recommendations      Nancy Erwin, APRN  1/17/2025

## 2025-03-18 RX ORDER — ACETAMINOPHEN AND CODEINE PHOSPHATE 120; 12 MG/5ML; MG/5ML
1 SOLUTION ORAL DAILY
Qty: 28 TABLET | Refills: 12 | Status: SHIPPED | OUTPATIENT
Start: 2025-03-18

## 2025-06-09 ENCOUNTER — TELEPHONE (OUTPATIENT)
Dept: OBSTETRICS AND GYNECOLOGY | Age: 50
End: 2025-06-09

## 2025-06-09 RX ORDER — ACETAMINOPHEN AND CODEINE PHOSPHATE 120; 12 MG/5ML; MG/5ML
1 SOLUTION ORAL DAILY
Qty: 28 TABLET | Refills: 0 | Status: SHIPPED | OUTPATIENT
Start: 2025-06-09

## 2025-06-09 NOTE — TELEPHONE ENCOUNTER
Caller: Natalie Lawrence  Female, 50 y.o., 1975  MRN: [id number]  CSN: [id number]  Phone: [phone]    Relationship to patient: SELF          Type of visit: US/ GYN FOLLOW UP     Requested date: AS SOON AS POSSIBLE     UNABLE TO WARM TRANSFER    PLEASE SEE TE FROM 6-7-25

## (undated) DEVICE — BNDG GZ SOF-FORM CONFRM 2X75IN LF STRL

## (undated) DEVICE — PROXIMATE RH ROTATING HEAD SKIN STAPLERS (35 REGULAR) CONTAINS 35 STAINLESS STEEL STAPLES: Brand: PROXIMATE

## (undated) DEVICE — GLV SURG BIOGEL LTX PF 8

## (undated) DEVICE — MAJOR DOUBLE BASIN W/GOWNS II: Brand: MEDLINE INDUSTRIES, INC.

## (undated) DEVICE — SUT ETHLN 4/0 FS2 18IN 662H

## (undated) DEVICE — APPL CHLORAPREP HI/LITE 26ML ORNG

## (undated) DEVICE — STRIP CLS WND CURAD MEDI/STRIP HYPOALLERG 0.25X4IN PK/10

## (undated) DEVICE — UTILITY MARKER W/MED LABELS: Brand: MEDLINE

## (undated) DEVICE — DRESSING,GAUZE,XEROFORM,CURAD,5"X9",ST: Brand: CURAD

## (undated) DEVICE — MARKER,SKIN,WI/RULER AND LABELS: Brand: MEDLINE

## (undated) DEVICE — 4-PORT MANIFOLD: Brand: NEPTUNE 2

## (undated) DEVICE — BAPTIST TURNOVER KIT: Brand: MEDLINE INDUSTRIES, INC.

## (undated) DEVICE — ADHS LIQ MASTISOL 2/3ML

## (undated) DEVICE — SUT ETHLN 5/0 PS2 18IN 1666H

## (undated) DEVICE — TOWEL,OR,DSP,ST,BL,NONWVN,COTTON: Brand: MEDLINE

## (undated) DEVICE — DRSNG SURESITE WNDW 4X4.5

## (undated) DEVICE — ANTIBACTERIAL UNDYED BRAIDED (POLYGLACTIN 910), SYNTHETIC ABSORBABLE SUTURE: Brand: COATED VICRYL